# Patient Record
Sex: MALE | URBAN - METROPOLITAN AREA
[De-identification: names, ages, dates, MRNs, and addresses within clinical notes are randomized per-mention and may not be internally consistent; named-entity substitution may affect disease eponyms.]

---

## 2020-12-03 ENCOUNTER — EMERGENCY (EMERGENCY)
Facility: HOSPITAL | Age: 70
LOS: 1 days | Discharge: HOME | End: 2020-12-03
Attending: EMERGENCY MEDICINE | Admitting: EMERGENCY MEDICINE
Payer: MEDICAID

## 2020-12-03 VITALS
DIASTOLIC BLOOD PRESSURE: 107 MMHG | TEMPERATURE: 100 F | OXYGEN SATURATION: 100 % | HEART RATE: 117 BPM | SYSTOLIC BLOOD PRESSURE: 180 MMHG | RESPIRATION RATE: 18 BRPM

## 2020-12-03 VITALS
TEMPERATURE: 101 F | DIASTOLIC BLOOD PRESSURE: 96 MMHG | HEART RATE: 108 BPM | OXYGEN SATURATION: 98 % | RESPIRATION RATE: 20 BRPM | SYSTOLIC BLOOD PRESSURE: 210 MMHG

## 2020-12-03 LAB
ALBUMIN SERPL ELPH-MCNC: 4.1 G/DL — SIGNIFICANT CHANGE UP (ref 3.3–5)
ALP SERPL-CCNC: 131 U/L — HIGH (ref 40–120)
ALT FLD-CCNC: 282 U/L — HIGH (ref 4–41)
ANION GAP SERPL CALC-SCNC: 12 MMO/L — SIGNIFICANT CHANGE UP (ref 7–14)
APPEARANCE UR: CLEAR — SIGNIFICANT CHANGE UP
APTT BLD: 30.9 SEC — SIGNIFICANT CHANGE UP (ref 27–36.3)
AST SERPL-CCNC: 159 U/L — HIGH (ref 4–40)
BASE EXCESS BLDV CALC-SCNC: -1 MMOL/L — SIGNIFICANT CHANGE UP
BASOPHILS # BLD AUTO: 0.02 K/UL — SIGNIFICANT CHANGE UP (ref 0–0.2)
BASOPHILS NFR BLD AUTO: 0.2 % — SIGNIFICANT CHANGE UP (ref 0–2)
BILIRUB SERPL-MCNC: 1 MG/DL — SIGNIFICANT CHANGE UP (ref 0.2–1.2)
BILIRUB UR-MCNC: NEGATIVE — SIGNIFICANT CHANGE UP
BLOOD GAS VENOUS - CREATININE: 1.18 MG/DL — SIGNIFICANT CHANGE UP (ref 0.5–1.3)
BLOOD GAS VENOUS - FIO2: 21 — SIGNIFICANT CHANGE UP
BLOOD UR QL VISUAL: SIGNIFICANT CHANGE UP
BUN SERPL-MCNC: 15 MG/DL — SIGNIFICANT CHANGE UP (ref 7–23)
CALCIUM SERPL-MCNC: 9.9 MG/DL — SIGNIFICANT CHANGE UP (ref 8.4–10.5)
CHLORIDE BLDV-SCNC: 109 MMOL/L — HIGH (ref 96–108)
CHLORIDE SERPL-SCNC: 101 MMOL/L — SIGNIFICANT CHANGE UP (ref 98–107)
CO2 SERPL-SCNC: 21 MMOL/L — LOW (ref 22–31)
COLOR SPEC: YELLOW — SIGNIFICANT CHANGE UP
CREAT SERPL-MCNC: 1.16 MG/DL — SIGNIFICANT CHANGE UP (ref 0.5–1.3)
EOSINOPHIL # BLD AUTO: 0 K/UL — SIGNIFICANT CHANGE UP (ref 0–0.5)
EOSINOPHIL NFR BLD AUTO: 0 % — SIGNIFICANT CHANGE UP (ref 0–6)
GAS PNL BLDV: 134 MMOL/L — LOW (ref 136–146)
GLUCOSE BLDV-MCNC: 172 MG/DL — HIGH (ref 70–99)
GLUCOSE SERPL-MCNC: 179 MG/DL — HIGH (ref 70–99)
GLUCOSE UR-MCNC: NEGATIVE — SIGNIFICANT CHANGE UP
HCO3 BLDV-SCNC: 24 MMOL/L — SIGNIFICANT CHANGE UP (ref 20–27)
HCT VFR BLD CALC: 40.4 % — SIGNIFICANT CHANGE UP (ref 39–50)
HCT VFR BLDV CALC: 42.7 % — SIGNIFICANT CHANGE UP (ref 39–51)
HGB BLD-MCNC: 13.5 G/DL — SIGNIFICANT CHANGE UP (ref 13–17)
HGB BLDV-MCNC: 13.9 G/DL — SIGNIFICANT CHANGE UP (ref 13–17)
IMM GRANULOCYTES NFR BLD AUTO: 0.3 % — SIGNIFICANT CHANGE UP (ref 0–1.5)
INR BLD: 1.31 — HIGH (ref 0.88–1.16)
KETONES UR-MCNC: SIGNIFICANT CHANGE UP
LACTATE BLDV-MCNC: 1.6 MMOL/L — SIGNIFICANT CHANGE UP (ref 0.5–2)
LEUKOCYTE ESTERASE UR-ACNC: NEGATIVE — SIGNIFICANT CHANGE UP
LYMPHOCYTES # BLD AUTO: 1.03 K/UL — SIGNIFICANT CHANGE UP (ref 1–3.3)
LYMPHOCYTES # BLD AUTO: 11.1 % — LOW (ref 13–44)
MCHC RBC-ENTMCNC: 28.6 PG — SIGNIFICANT CHANGE UP (ref 27–34)
MCHC RBC-ENTMCNC: 33.4 % — SIGNIFICANT CHANGE UP (ref 32–36)
MCV RBC AUTO: 85.6 FL — SIGNIFICANT CHANGE UP (ref 80–100)
MONOCYTES # BLD AUTO: 0.8 K/UL — SIGNIFICANT CHANGE UP (ref 0–0.9)
MONOCYTES NFR BLD AUTO: 8.7 % — SIGNIFICANT CHANGE UP (ref 2–14)
NEUTROPHILS # BLD AUTO: 7.36 K/UL — SIGNIFICANT CHANGE UP (ref 1.8–7.4)
NEUTROPHILS NFR BLD AUTO: 79.7 % — HIGH (ref 43–77)
NITRITE UR-MCNC: NEGATIVE — SIGNIFICANT CHANGE UP
NRBC # FLD: 0 K/UL — SIGNIFICANT CHANGE UP (ref 0–0)
PCO2 BLDV: 37 MMHG — LOW (ref 41–51)
PH BLDV: 7.41 PH — SIGNIFICANT CHANGE UP (ref 7.32–7.43)
PH UR: 6 — SIGNIFICANT CHANGE UP (ref 5–8)
PLATELET # BLD AUTO: 160 K/UL — SIGNIFICANT CHANGE UP (ref 150–400)
PMV BLD: 11.7 FL — SIGNIFICANT CHANGE UP (ref 7–13)
PO2 BLDV: 57 MMHG — HIGH (ref 35–40)
POTASSIUM BLDV-SCNC: 3.3 MMOL/L — LOW (ref 3.4–4.5)
POTASSIUM SERPL-MCNC: 3.5 MMOL/L — SIGNIFICANT CHANGE UP (ref 3.5–5.3)
POTASSIUM SERPL-SCNC: 3.5 MMOL/L — SIGNIFICANT CHANGE UP (ref 3.5–5.3)
PROT SERPL-MCNC: 7.5 G/DL — SIGNIFICANT CHANGE UP (ref 6–8.3)
PROT UR-MCNC: 70 — SIGNIFICANT CHANGE UP
PROTHROM AB SERPL-ACNC: 14.7 SEC — HIGH (ref 10.6–13.6)
RBC # BLD: 4.72 M/UL — SIGNIFICANT CHANGE UP (ref 4.2–5.8)
RBC # FLD: 13 % — SIGNIFICANT CHANGE UP (ref 10.3–14.5)
RBC CASTS # UR COMP ASSIST: SIGNIFICANT CHANGE UP (ref 0–?)
SAO2 % BLDV: 89.6 % — HIGH (ref 60–85)
SODIUM SERPL-SCNC: 134 MMOL/L — LOW (ref 135–145)
SP GR SPEC: 1.02 — SIGNIFICANT CHANGE UP (ref 1–1.04)
UROBILINOGEN FLD QL: NORMAL — SIGNIFICANT CHANGE UP
WBC # BLD: 9.24 K/UL — SIGNIFICANT CHANGE UP (ref 3.8–10.5)
WBC # FLD AUTO: 9.24 K/UL — SIGNIFICANT CHANGE UP (ref 3.8–10.5)
WBC UR QL: SIGNIFICANT CHANGE UP (ref 0–?)

## 2020-12-03 PROCEDURE — 99284 EMERGENCY DEPT VISIT MOD MDM: CPT

## 2020-12-03 PROCEDURE — 71045 X-RAY EXAM CHEST 1 VIEW: CPT | Mod: 26

## 2020-12-03 RX ORDER — ACETAMINOPHEN 500 MG
650 TABLET ORAL ONCE
Refills: 0 | Status: COMPLETED | OUTPATIENT
Start: 2020-12-03 | End: 2020-12-03

## 2020-12-03 RX ORDER — SODIUM CHLORIDE 9 MG/ML
1000 INJECTION INTRAMUSCULAR; INTRAVENOUS; SUBCUTANEOUS ONCE
Refills: 0 | Status: COMPLETED | OUTPATIENT
Start: 2020-12-03 | End: 2020-12-03

## 2020-12-03 RX ADMIN — SODIUM CHLORIDE 500 MILLILITER(S): 9 INJECTION INTRAMUSCULAR; INTRAVENOUS; SUBCUTANEOUS at 16:52

## 2020-12-03 RX ADMIN — Medication 650 MILLIGRAM(S): at 17:26

## 2020-12-03 NOTE — ED ADULT NURSE NOTE - OBJECTIVE STATEMENT
Pt presents to rm 18, A&Ox4, ambulatory w/o assistance, here for evaluation of "weakness." Pt denies pain at this time. Pt has temp of 100.5 and hypertensive at this time- SOPHIA Argueta aware, no interventions ordered at this time. Denies chest pain, shortness of breath, palpitations, diaphoresis, headaches, fevers, dizziness, nausea, vomiting, diarrhea, or urinary symptoms at this time. IV established in left arm with a 18G, labs drawn and sent, call bell in reach, warm blanket provided, bed in lowest position, side rails up x2, MD evaluation in progress. Will continue to monitor.

## 2020-12-03 NOTE — ED ADULT TRIAGE NOTE - CHIEF COMPLAINT QUOTE
pt brought in by ambulance from home, sent to ED for high blood pressure. also c/o weakness and frequent urination. no facial droop noted in triage. left sided weakness noted. red attending called to triage for eval. pt feels warm to touch. pt is German speaking.

## 2020-12-03 NOTE — ED PROVIDER NOTE - NSFOLLOWUPINSTRUCTIONS_ED_ALL_ED_FT
Fever    A fever is an increase in the body's temperature above 100.4°F (38°C) or higher. In adults and children older than three months, a brief mild or moderate fever generally has no long-term effect, and it usually does not require treatment. Many times, fevers are the result of viral infections, which are self-resolving.  However, certain symptoms or diagnostic tests may suggest a bacterial infection that may respond to antibiotics. Take medications as directed by your health care provider.    SEEK IMMEDIATE MEDICAL CARE IF YOU OR YOUR CHILD HAVE ANY OF THE FOLLOWING SYMPTOMS : shortness of breath, seizure, rash/stiff neck/headache, severe abdominal pain, persistent vomiting, any signs of dehydration, or if your child has a fever for over five (5) days.    Please follow up w/ primary care.

## 2020-12-03 NOTE — ED PROVIDER NOTE - CLINICAL SUMMARY MEDICAL DECISION MAKING FREE TEXT BOX
71 Y/O M w/ no PMH presents to ER w/ weakness. ED sepsis work up. 69 Y/O M w/ no PMH presents to ER w/ weakness. ED sepsis work up.    Lab results, EKG and CXR 69 Y/O M w/ no PMH presents to ER w/ weakness. ED sepsis work up.    EKG and CXR unremarkable. Elevated LFTs. No abdominal pain. Can follow up outpatient. Elevated BP w/o end organ damage. Pt offers no complaints at this time. Discharge center will coordinate primary care appointment. Return precautions provided

## 2020-12-03 NOTE — ED ADULT NURSE NOTE - CHIEF COMPLAINT QUOTE
pt brought in by ambulance from home, sent to ED for high blood pressure. also c/o weakness and frequent urination. no facial droop noted in triage. left sided weakness noted. red attending called to triage for eval. pt feels warm to touch. pt is Korean speaking.

## 2020-12-03 NOTE — ED PROVIDER NOTE - NS ED ROS FT
Constitutional: (-) fever (+) weakness  Head: Normal cephalic, Atraumatic  Eyes/ENT: (-) vision changes  Cardiovascular: (-) chest pain, (-) wheezing  Respiratory: (-) cough, (-) shortness of breath  Gastrointestinal: (-) vomiting, (-) diarrhea, (-) abdominal pain  : (-) dysuria   Musculoskeletal: (-) back pain  Integumentary: (-) rash, (-) edema  Neurological: (-)loc  Allergic/Immunologic: (-) pruritus

## 2020-12-03 NOTE — ED PROVIDER NOTE - PROGRESS NOTE DETAILS
Severo (Son, [phone]) contacted ER to discuss father's condition. Pt states dad started to feel weakness yesterday evening

## 2020-12-03 NOTE — ED PROVIDER NOTE - OBJECTIVE STATEMENT
69 Y/O M w/ no PMH presents to ER w/ weakness. PT is Japanese speaking. Admits to feeling weak earlier this morning. No recent travel, no sick contacts, former smoker. Able to tolerate PO oral intake. Denies fever, nausea/vomiting, dizziness, headache, sore throat, cough, chest pain, shortness of breath, palpitations or syncope.

## 2020-12-03 NOTE — ED PROVIDER NOTE - PHYSICAL EXAMINATION
Vital signs reviewed.   CONSTITUTIONAL: Well-appearing; well-nourished; in no apparent distress. Non-toxic appearing.   HEAD: Normocephalic, atraumatic.  EYES: PERRL, EOM intact, conjunctiva and no sclera injection noted  ENT: normal nose; no rhinorrhea; normal pharynx with no tonsillar hypertrophy, no erythema, no exudate, patent airway  NECK/LYMPH: Supple; non-tender  CARD: Normal S1, S2  RESP: Normal chest excursion with respiration; breath sounds clear and equal bilaterally  ABD/GI: soft, non-distended; non-tender  EXT/MS: moves all extremities; distal pulses are normal, no pedal edema.  SKIN: Normal for age and race; warm; dry; good turgor; no apparent lesions or exudate noted.  NEURO: Awake, alert, oriented x 3, no gross deficits, CN II-XII grossly intact, no motor or sensory deficit noted.  PSYCH: Normal mood; appropriate affect.

## 2020-12-03 NOTE — ED PROVIDER NOTE - PATIENT PORTAL LINK FT
You can access the FollowMyHealth Patient Portal offered by [facility] by registering at the following website: [email or web address]. By joining [facility]’s FollowMyHealth portal, you will also be able to view your health information using other applications (apps) compatible with our system.

## 2020-12-04 LAB
CULTURE RESULTS: SIGNIFICANT CHANGE UP
SARS-COV-2 RNA SPEC QL NAA+PROBE: SIGNIFICANT CHANGE UP
SPECIMEN SOURCE: SIGNIFICANT CHANGE UP

## 2020-12-08 LAB
CULTURE RESULTS: SIGNIFICANT CHANGE UP
CULTURE RESULTS: SIGNIFICANT CHANGE UP
SPECIMEN SOURCE: SIGNIFICANT CHANGE UP
SPECIMEN SOURCE: SIGNIFICANT CHANGE UP

## 2020-12-16 PROBLEM — Z00.00 ENCOUNTER FOR PREVENTIVE HEALTH EXAMINATION: Status: ACTIVE | Noted: 2020-12-16

## 2020-12-17 ENCOUNTER — APPOINTMENT (OUTPATIENT)
Dept: INTERNAL MEDICINE | Facility: CLINIC | Age: 70
End: 2020-12-17

## 2021-01-19 ENCOUNTER — OUTPATIENT (OUTPATIENT)
Dept: OUTPATIENT SERVICES | Facility: HOSPITAL | Age: 71
LOS: 1 days | End: 2021-01-19

## 2021-01-19 ENCOUNTER — APPOINTMENT (OUTPATIENT)
Dept: INTERNAL MEDICINE | Facility: CLINIC | Age: 71
End: 2021-01-19
Payer: MEDICAID

## 2021-01-19 ENCOUNTER — NON-APPOINTMENT (OUTPATIENT)
Age: 71
End: 2021-01-19

## 2021-01-19 VITALS
OXYGEN SATURATION: 96 % | HEART RATE: 120 BPM | BODY MASS INDEX: 25.61 KG/M2 | HEIGHT: 64 IN | WEIGHT: 150 LBS | DIASTOLIC BLOOD PRESSURE: 110 MMHG | SYSTOLIC BLOOD PRESSURE: 198 MMHG | RESPIRATION RATE: 16 BRPM

## 2021-01-19 DIAGNOSIS — Z86.39 PERSONAL HISTORY OF OTHER ENDOCRINE, NUTRITIONAL AND METABOLIC DISEASE: ICD-10-CM

## 2021-01-19 PROCEDURE — 99204 OFFICE O/P NEW MOD 45 MIN: CPT | Mod: GC

## 2021-01-19 RX ORDER — LISINOPRIL 20 MG/1
20 TABLET ORAL
Qty: 30 | Refills: 1 | Status: ACTIVE | COMMUNITY
Start: 2021-01-19 | End: 1900-01-01

## 2021-01-19 RX ORDER — AMLODIPINE BESYLATE 10 MG/1
10 TABLET ORAL
Qty: 30 | Refills: 4 | Status: ACTIVE | COMMUNITY
Start: 2021-01-19 | End: 1900-01-01

## 2021-01-22 DIAGNOSIS — I10 ESSENTIAL (PRIMARY) HYPERTENSION: ICD-10-CM

## 2021-01-22 DIAGNOSIS — Z86.39 PERSONAL HISTORY OF OTHER ENDOCRINE, NUTRITIONAL AND METABOLIC DISEASE: ICD-10-CM

## 2021-01-22 NOTE — HISTORY OF PRESENT ILLNESS
[FreeTextEntry1] : Establish care [de-identified] : Pt is 72 y/o M with PMHx HTN, type II DM here to establish care. \par \par Patient is Mongolian speaking and is accompanied by his daughter to assist with translation. Patient has lived in the [place] on and off for the last 10 years. Patient had difficulty with insurance and stopped taking medications regularly. He is intermittently taking lisinopril 10 BID, aspirin 81 mg qd and metformin 500 qd. Patient also has difficulty with hearing. Patient intermittently checks blood pressure at home with SBP readings in 160s. Patient reports not taking his BP medication this morning. Of note, patient's BP elevated at 198/100. Patient denies any SOB, chest pain, changes in vision.

## 2021-01-22 NOTE — PLAN
[FreeTextEntry1] : Pt is 72 y/o M with PMHx HTN, type II DM, of here to establish care. \par \par #HTN\par -BP today elevated 198/100 - with repeat /110 \par -Pt noncompliant with medication regimen\par -EKG shows sinus tachycardia without ischemic changes - pt remains asymptomatic \par -Start lisinopril 20 qd, amlodipine 10 qd\par -Plan to reassess BP next week and make any adjustments to medication regimen as needed\par -Pt instructed to regularly check his BP at home \par \par #DM\par -Unable to complete labs today - plan to acquire labs at next visit and address diabetic regimen at that time\par \par Pt to RTC in 1 week \par \par Scarlet Brooks PGY-2 \par Discussed with and patient seen with Dr. Lange

## 2021-01-22 NOTE — END OF VISIT
[] : Resident [FreeTextEntry3] : repeat BP improved. EKG NSR with no ischemic changes. pt asymptomatic. restarted HTN meds- lisinoptol and added amlodipine. recheck BP in 1 week. encourage med complaiance and checking BP at home. advised to call sooner if BP greater than 180/100 at home.

## 2021-01-22 NOTE — REVIEW OF SYSTEMS
[Frequency] : frequency [Negative] : Heme/Lymph [Dysuria] : no dysuria [Incontinence] : no incontinence [Hesitancy] : no hesitancy [Nocturia] : no nocturia [Hematuria] : no hematuria [Impotence] : no impotency [Poor Libido] : libido not poor

## 2021-01-22 NOTE — PHYSICAL EXAM
[No Acute Distress] : no acute distress [Well Nourished] : well nourished [Normal Rate] : normal rate  [Regular Rhythm] : with a regular rhythm [Normal S1, S2] : normal S1 and S2 [No Edema] : there was no peripheral edema [Soft] : abdomen soft [No HSM] : no HSM [Normal] : no joint swelling and grossly normal strength and tone [de-identified] : elderly male

## 2021-02-01 ENCOUNTER — APPOINTMENT (OUTPATIENT)
Dept: INTERNAL MEDICINE | Facility: CLINIC | Age: 71
End: 2021-02-01

## 2021-02-10 ENCOUNTER — RESULT CHARGE (OUTPATIENT)
Age: 71
End: 2021-02-10

## 2021-02-10 ENCOUNTER — APPOINTMENT (OUTPATIENT)
Dept: INTERNAL MEDICINE | Facility: CLINIC | Age: 71
End: 2021-02-10
Payer: MEDICAID

## 2021-02-10 ENCOUNTER — OUTPATIENT (OUTPATIENT)
Dept: OUTPATIENT SERVICES | Facility: HOSPITAL | Age: 71
LOS: 1 days | End: 2021-02-10

## 2021-02-10 VITALS
RESPIRATION RATE: 16 BRPM | DIASTOLIC BLOOD PRESSURE: 78 MMHG | HEIGHT: 64 IN | SYSTOLIC BLOOD PRESSURE: 140 MMHG | HEART RATE: 109 BPM | WEIGHT: 150 LBS | BODY MASS INDEX: 25.61 KG/M2 | OXYGEN SATURATION: 99 %

## 2021-02-10 VITALS — TEMPERATURE: 98.1 F

## 2021-02-10 DIAGNOSIS — E11.9 TYPE 2 DIABETES MELLITUS W/OUT COMPLICATIONS: ICD-10-CM

## 2021-02-10 DIAGNOSIS — Z23 ENCOUNTER FOR IMMUNIZATION: ICD-10-CM

## 2021-02-10 DIAGNOSIS — I10 ESSENTIAL (PRIMARY) HYPERTENSION: ICD-10-CM

## 2021-02-10 DIAGNOSIS — H57.89 OTHER SPECIFIED DISORDERS OF EYE AND ADNEXA: ICD-10-CM

## 2021-02-10 DIAGNOSIS — H91.93 UNSPECIFIED HEARING LOSS, BILATERAL: ICD-10-CM

## 2021-02-10 PROCEDURE — 99213 OFFICE O/P EST LOW 20 MIN: CPT | Mod: GE

## 2021-02-10 RX ORDER — METFORMIN HYDROCHLORIDE 500 MG/1
500 TABLET, COATED ORAL TWICE DAILY
Qty: 180 | Refills: 3 | Status: ACTIVE | COMMUNITY
Start: 2021-02-10 | End: 1900-01-01

## 2021-02-10 RX ORDER — DEXTRAN 70/HYPROMELLOSE 0.1%-0.3%
0.1-0.3 DROPS OPHTHALMIC (EYE) TWICE DAILY
Qty: 15 | Refills: 3 | Status: ACTIVE | COMMUNITY
Start: 2021-02-10 | End: 1900-01-01

## 2021-02-10 RX ORDER — CHLORHEXIDINE/GLYCERIN/HE-CELL
5-100 JELLY (GRAM) TOPICAL EVERY 4 HOURS
Qty: 1 | Refills: 1 | Status: ACTIVE | COMMUNITY
Start: 2021-02-10 | End: 1900-01-01

## 2021-02-10 NOTE — PHYSICAL EXAM
[Well Nourished] : well nourished [No Acute Distress] : no acute distress [Well Developed] : well developed [Well-Appearing] : well-appearing [Normal Sclera/Conjunctiva] : normal sclera/conjunctiva [PERRL] : pupils equal round and reactive to light [EOMI] : extraocular movements intact [Normal Outer Ear/Nose] : the outer ears and nose were normal in appearance [Normal Oropharynx] : the oropharynx was normal [No JVD] : no jugular venous distention [No Lymphadenopathy] : no lymphadenopathy [Supple] : supple [Thyroid Normal, No Nodules] : the thyroid was normal and there were no nodules present [No Respiratory Distress] : no respiratory distress  [No Accessory Muscle Use] : no accessory muscle use [Clear to Auscultation] : lungs were clear to auscultation bilaterally [Normal Rate] : normal rate  [Regular Rhythm] : with a regular rhythm [Normal S1, S2] : normal S1 and S2 [No Murmur] : no murmur heard [No Carotid Bruits] : no carotid bruits [No Abdominal Bruit] : a ~M bruit was not heard ~T in the abdomen [No Varicosities] : no varicosities [Pedal Pulses Present] : the pedal pulses are present [No Edema] : there was no peripheral edema [No Palpable Aorta] : no palpable aorta [No Extremity Clubbing/Cyanosis] : no extremity clubbing/cyanosis [Soft] : abdomen soft [Non Tender] : non-tender [Non-distended] : non-distended [No Masses] : no abdominal mass palpated [No HSM] : no HSM [Normal Bowel Sounds] : normal bowel sounds [Normal Posterior Cervical Nodes] : no posterior cervical lymphadenopathy [Normal Anterior Cervical Nodes] : no anterior cervical lymphadenopathy [No CVA Tenderness] : no CVA  tenderness [No Spinal Tenderness] : no spinal tenderness [No Joint Swelling] : no joint swelling [Grossly Normal Strength/Tone] : grossly normal strength/tone [No Rash] : no rash [Coordination Grossly Intact] : coordination grossly intact [No Focal Deficits] : no focal deficits [Normal Gait] : normal gait [Deep Tendon Reflexes (DTR)] : deep tendon reflexes were 2+ and symmetric [Normal Affect] : the affect was normal [Normal Insight/Judgement] : insight and judgment were intact

## 2021-02-11 ENCOUNTER — APPOINTMENT (OUTPATIENT)
Dept: INTERNAL MEDICINE | Facility: CLINIC | Age: 71
End: 2021-02-11

## 2021-02-11 LAB
GLUCOSE BLDC GLUCOMTR-MCNC: 213
HBA1C MFR BLD HPLC: 7.4

## 2021-02-16 DIAGNOSIS — Z23 ENCOUNTER FOR IMMUNIZATION: ICD-10-CM

## 2021-02-16 DIAGNOSIS — H91.93 UNSPECIFIED HEARING LOSS, BILATERAL: ICD-10-CM

## 2021-02-16 DIAGNOSIS — H57.89 OTHER SPECIFIED DISORDERS OF EYE AND ADNEXA: ICD-10-CM

## 2021-02-16 DIAGNOSIS — E11.9 TYPE 2 DIABETES MELLITUS WITHOUT COMPLICATIONS: ICD-10-CM

## 2021-02-16 DIAGNOSIS — I10 ESSENTIAL (PRIMARY) HYPERTENSION: ICD-10-CM

## 2021-02-16 NOTE — HISTORY OF PRESENT ILLNESS
[FreeTextEntry1] : The patient is here for follow up.  [de-identified] : 71M PMH HTN, T2DM, and hearing impairment with hearing aids who presents for follow up visit.\par \par 1) Cough - Per daughter-in-law, the patient has been experiencing a cough productive of mucus for the past month. He coughs more when eating, talking or drinking. It is not because it is difficult to swallow but more that he needs to clear his throat, is drinking something cold, or eating spicy food. He has a runny nose at times. He also appears tired and feels a bit short of breath with activity. He does not exercise. Patient has no history of smoking, lung disease or heart disease. He denies fever, sick contacts, recent travel, or COVID-19 exposure. Last COVID-19 test in December 2020 was negative. Cough started before starting lisinopril. He denies allergies. He has not tried anything for the cough.\par \par 2) HTN - Since starting his lisinopril and amlodipine, the he has been compliant with his medication. He checks his BP at home occasionally. \par \par 3) T2DM - Patient took oral blood sugar medications in [place] the [place]. He has had issues with insurance so has not been compliant. He was taking Glucophage in [place]. He denies hypoglycemic episodes, polyuria, and polydipsia. He eats all kinds of food without restriction. He does not check his blood sugar at home.\par \par 4) HCM - Patient is uncertain of immunization history and has never had a colonoscopy.

## 2021-02-16 NOTE — PLAN
[FreeTextEntry1] : 71M PMH HTN, T2DM, and hearing impairment with hearing aids who presents for follow up visit.\par \par 1) Cough - likely 2/2 upper respiratory infection. Not likely lisinopril given cough before starting the medication. Patient without clinical signs of heart failure. Patient without allergies. Patient has not tried to treat cough.\par - will prescribe cough medicine.\par - if no improvement in cough, will consider CXR.\par \par 2) HTN - /78 mm Hg in office. Goal is <130/80 mm Hg. Given patient's current generalized weakness and fatigue, will address increasing medication dose at next visit. \par - counseled patient on low-salt diet.\par - Continue with lisinopril 20 mg daily and amlodipine 10 mg daily for now.\par \par 3) T2DM -  mg/dL. Hgb A1C 7.4%. Counseled patient on low-sugar diet.\par - will begin metformin 500 mg BID.\par - will check CMP and lipid panel.\par \par 4) HCM - Patient received his influenza vaccine today.\par \par Patient discussed with Dr. Whiting.\par All risks and benefits were discussed with patient. \par Patient and attending agreed with the above assessment and plan.\par \par Zeynep Gilmore M.D. PGY-3\par Internal Medicine\par [facility] [place]\par [phone]\par

## 2021-02-23 ENCOUNTER — OUTPATIENT (OUTPATIENT)
Dept: OUTPATIENT SERVICES | Facility: HOSPITAL | Age: 71
LOS: 1 days | End: 2021-02-23

## 2021-02-23 ENCOUNTER — APPOINTMENT (OUTPATIENT)
Dept: INTERNAL MEDICINE | Facility: CLINIC | Age: 71
End: 2021-02-23

## 2021-02-23 ENCOUNTER — RESULT REVIEW (OUTPATIENT)
Age: 71
End: 2021-02-23

## 2021-02-23 LAB
ALBUMIN SERPL ELPH-MCNC: 4.5 G/DL — SIGNIFICANT CHANGE UP (ref 3.3–5)
ALP SERPL-CCNC: 100 U/L — SIGNIFICANT CHANGE UP (ref 40–120)
ALT FLD-CCNC: 14 U/L — SIGNIFICANT CHANGE UP (ref 4–41)
ANION GAP SERPL CALC-SCNC: 10 MMOL/L — SIGNIFICANT CHANGE UP (ref 7–14)
AST SERPL-CCNC: 11 U/L — SIGNIFICANT CHANGE UP (ref 4–40)
BASOPHILS # BLD AUTO: 0.04 K/UL — SIGNIFICANT CHANGE UP (ref 0–0.2)
BASOPHILS NFR BLD AUTO: 0.4 % — SIGNIFICANT CHANGE UP (ref 0–2)
BILIRUB SERPL-MCNC: 0.2 MG/DL — SIGNIFICANT CHANGE UP (ref 0.2–1.2)
BUN SERPL-MCNC: 20 MG/DL — SIGNIFICANT CHANGE UP (ref 7–23)
CALCIUM SERPL-MCNC: 10.6 MG/DL — HIGH (ref 8.4–10.5)
CHLORIDE SERPL-SCNC: 103 MMOL/L — SIGNIFICANT CHANGE UP (ref 98–107)
CHOLEST SERPL-MCNC: 142 MG/DL — SIGNIFICANT CHANGE UP
CO2 SERPL-SCNC: 26 MMOL/L — SIGNIFICANT CHANGE UP (ref 22–31)
CREAT SERPL-MCNC: 1.11 MG/DL — SIGNIFICANT CHANGE UP (ref 0.5–1.3)
EOSINOPHIL # BLD AUTO: 0.26 K/UL — SIGNIFICANT CHANGE UP (ref 0–0.5)
EOSINOPHIL NFR BLD AUTO: 2.5 % — SIGNIFICANT CHANGE UP (ref 0–6)
GLUCOSE SERPL-MCNC: 212 MG/DL — HIGH (ref 70–99)
HCT VFR BLD CALC: 45.6 % — SIGNIFICANT CHANGE UP (ref 39–50)
HDLC SERPL-MCNC: 46 MG/DL — SIGNIFICANT CHANGE UP
HGB BLD-MCNC: 13.9 G/DL — SIGNIFICANT CHANGE UP (ref 13–17)
IANC: 5.29 K/UL — SIGNIFICANT CHANGE UP (ref 1.5–8.5)
IMM GRANULOCYTES NFR BLD AUTO: 0.5 % — SIGNIFICANT CHANGE UP (ref 0–1.5)
LIPID PNL WITH DIRECT LDL SERPL: 78 MG/DL — SIGNIFICANT CHANGE UP
LYMPHOCYTES # BLD AUTO: 38.7 % — SIGNIFICANT CHANGE UP (ref 13–44)
LYMPHOCYTES # BLD AUTO: 4.1 K/UL — HIGH (ref 1–3.3)
MCHC RBC-ENTMCNC: 27.3 PG — SIGNIFICANT CHANGE UP (ref 27–34)
MCHC RBC-ENTMCNC: 30.5 GM/DL — LOW (ref 32–36)
MCV RBC AUTO: 89.4 FL — SIGNIFICANT CHANGE UP (ref 80–100)
MONOCYTES # BLD AUTO: 0.86 K/UL — SIGNIFICANT CHANGE UP (ref 0–0.9)
MONOCYTES NFR BLD AUTO: 8.1 % — SIGNIFICANT CHANGE UP (ref 2–14)
NEUTROPHILS # BLD AUTO: 5.29 K/UL — SIGNIFICANT CHANGE UP (ref 1.8–7.4)
NEUTROPHILS NFR BLD AUTO: 49.8 % — SIGNIFICANT CHANGE UP (ref 43–77)
NON HDL CHOLESTEROL: 96 MG/DL — SIGNIFICANT CHANGE UP
NRBC # BLD: 0 /100 WBCS — SIGNIFICANT CHANGE UP
NRBC # FLD: 0 K/UL — SIGNIFICANT CHANGE UP
PLATELET # BLD AUTO: 225 K/UL — SIGNIFICANT CHANGE UP (ref 150–400)
POTASSIUM SERPL-MCNC: 4.7 MMOL/L — SIGNIFICANT CHANGE UP (ref 3.5–5.3)
POTASSIUM SERPL-SCNC: 4.7 MMOL/L — SIGNIFICANT CHANGE UP (ref 3.5–5.3)
PROT SERPL-MCNC: 8.1 G/DL — SIGNIFICANT CHANGE UP (ref 6–8.3)
RBC # BLD: 5.1 M/UL — SIGNIFICANT CHANGE UP (ref 4.2–5.8)
RBC # FLD: 13.1 % — SIGNIFICANT CHANGE UP (ref 10.3–14.5)
SODIUM SERPL-SCNC: 139 MMOL/L — SIGNIFICANT CHANGE UP (ref 135–145)
TRIGL SERPL-MCNC: 88 MG/DL — SIGNIFICANT CHANGE UP
TSH SERPL-MCNC: 0.67 UIU/ML — SIGNIFICANT CHANGE UP (ref 0.27–4.2)
WBC # BLD: 10.6 K/UL — HIGH (ref 3.8–10.5)
WBC # FLD AUTO: 10.6 K/UL — HIGH (ref 3.8–10.5)

## 2021-02-26 DIAGNOSIS — E11.9 TYPE 2 DIABETES MELLITUS WITHOUT COMPLICATIONS: ICD-10-CM

## 2021-05-26 ENCOUNTER — APPOINTMENT (OUTPATIENT)
Dept: INTERNAL MEDICINE | Facility: CLINIC | Age: 71
End: 2021-05-26

## 2022-06-23 ENCOUNTER — NON-APPOINTMENT (OUTPATIENT)
Age: 72
End: 2022-06-23

## 2022-07-19 ENCOUNTER — APPOINTMENT (OUTPATIENT)
Dept: INTERNAL MEDICINE | Facility: CLINIC | Age: 72
End: 2022-07-19

## 2022-08-01 ENCOUNTER — APPOINTMENT (OUTPATIENT)
Dept: INTERNAL MEDICINE | Facility: CLINIC | Age: 72
End: 2022-08-01

## 2022-08-16 ENCOUNTER — INPATIENT (INPATIENT)
Facility: HOSPITAL | Age: 72
LOS: 10 days | Discharge: NURSING, REHAB OR LONG-TERM CARE | End: 2022-08-27
Attending: INTERNAL MEDICINE | Admitting: INTERNAL MEDICINE

## 2022-08-16 VITALS
OXYGEN SATURATION: 96 % | HEART RATE: 106 BPM | RESPIRATION RATE: 19 BRPM | DIASTOLIC BLOOD PRESSURE: 90 MMHG | SYSTOLIC BLOOD PRESSURE: 169 MMHG | TEMPERATURE: 101 F

## 2022-08-16 DIAGNOSIS — G93.41 METABOLIC ENCEPHALOPATHY: ICD-10-CM

## 2022-08-16 DIAGNOSIS — N40.0 BENIGN PROSTATIC HYPERPLASIA WITHOUT LOWER URINARY TRACT SYMPTOMS: ICD-10-CM

## 2022-08-16 DIAGNOSIS — E11.9 TYPE 2 DIABETES MELLITUS WITHOUT COMPLICATIONS: ICD-10-CM

## 2022-08-16 DIAGNOSIS — I10 ESSENTIAL (PRIMARY) HYPERTENSION: ICD-10-CM

## 2022-08-16 DIAGNOSIS — I50.9 HEART FAILURE, UNSPECIFIED: ICD-10-CM

## 2022-08-16 DIAGNOSIS — I63.9 CEREBRAL INFARCTION, UNSPECIFIED: ICD-10-CM

## 2022-08-16 DIAGNOSIS — U07.1 COVID-19: ICD-10-CM

## 2022-08-16 DIAGNOSIS — Z29.9 ENCOUNTER FOR PROPHYLACTIC MEASURES, UNSPECIFIED: ICD-10-CM

## 2022-08-16 DIAGNOSIS — R50.9 FEVER, UNSPECIFIED: ICD-10-CM

## 2022-08-16 LAB
ALBUMIN SERPL ELPH-MCNC: 4.4 G/DL — SIGNIFICANT CHANGE UP (ref 3.3–5)
ALP SERPL-CCNC: 88 U/L — SIGNIFICANT CHANGE UP (ref 40–120)
ALT FLD-CCNC: 12 U/L — SIGNIFICANT CHANGE UP (ref 4–41)
ANION GAP SERPL CALC-SCNC: 14 MMOL/L — SIGNIFICANT CHANGE UP (ref 7–14)
APTT BLD: 31.3 SEC — SIGNIFICANT CHANGE UP (ref 27–36.3)
AST SERPL-CCNC: 24 U/L — SIGNIFICANT CHANGE UP (ref 4–40)
B PERT DNA SPEC QL NAA+PROBE: SIGNIFICANT CHANGE UP
B PERT+PARAPERT DNA PNL SPEC NAA+PROBE: SIGNIFICANT CHANGE UP
BASOPHILS # BLD AUTO: 0.04 K/UL — SIGNIFICANT CHANGE UP (ref 0–0.2)
BASOPHILS NFR BLD AUTO: 0.5 % — SIGNIFICANT CHANGE UP (ref 0–2)
BILIRUB SERPL-MCNC: 0.6 MG/DL — SIGNIFICANT CHANGE UP (ref 0.2–1.2)
BLOOD GAS VENOUS COMPREHENSIVE RESULT: SIGNIFICANT CHANGE UP
BORDETELLA PARAPERTUSSIS (RAPRVP): SIGNIFICANT CHANGE UP
BUN SERPL-MCNC: 19 MG/DL — SIGNIFICANT CHANGE UP (ref 7–23)
C PNEUM DNA SPEC QL NAA+PROBE: SIGNIFICANT CHANGE UP
CALCIUM SERPL-MCNC: 10.1 MG/DL — SIGNIFICANT CHANGE UP (ref 8.4–10.5)
CHLORIDE SERPL-SCNC: 99 MMOL/L — SIGNIFICANT CHANGE UP (ref 98–107)
CO2 SERPL-SCNC: 21 MMOL/L — LOW (ref 22–31)
CREAT SERPL-MCNC: 1.22 MG/DL — SIGNIFICANT CHANGE UP (ref 0.5–1.3)
EGFR: 63 ML/MIN/1.73M2 — SIGNIFICANT CHANGE UP
EOSINOPHIL # BLD AUTO: 0.14 K/UL — SIGNIFICANT CHANGE UP (ref 0–0.5)
EOSINOPHIL NFR BLD AUTO: 1.6 % — SIGNIFICANT CHANGE UP (ref 0–6)
FLUAV SUBTYP SPEC NAA+PROBE: SIGNIFICANT CHANGE UP
FLUBV RNA SPEC QL NAA+PROBE: SIGNIFICANT CHANGE UP
GLUCOSE BLDC GLUCOMTR-MCNC: 131 MG/DL — HIGH (ref 70–99)
GLUCOSE BLDC GLUCOMTR-MCNC: 137 MG/DL — HIGH (ref 70–99)
GLUCOSE BLDC GLUCOMTR-MCNC: 142 MG/DL — HIGH (ref 70–99)
GLUCOSE BLDC GLUCOMTR-MCNC: 149 MG/DL — HIGH (ref 70–99)
GLUCOSE BLDC GLUCOMTR-MCNC: 194 MG/DL — HIGH (ref 70–99)
GLUCOSE SERPL-MCNC: 174 MG/DL — HIGH (ref 70–99)
HADV DNA SPEC QL NAA+PROBE: SIGNIFICANT CHANGE UP
HCOV 229E RNA SPEC QL NAA+PROBE: SIGNIFICANT CHANGE UP
HCOV HKU1 RNA SPEC QL NAA+PROBE: SIGNIFICANT CHANGE UP
HCOV NL63 RNA SPEC QL NAA+PROBE: SIGNIFICANT CHANGE UP
HCOV OC43 RNA SPEC QL NAA+PROBE: SIGNIFICANT CHANGE UP
HCT VFR BLD CALC: 41.8 % — SIGNIFICANT CHANGE UP (ref 39–50)
HGB BLD-MCNC: 14 G/DL — SIGNIFICANT CHANGE UP (ref 13–17)
HMPV RNA SPEC QL NAA+PROBE: SIGNIFICANT CHANGE UP
HPIV1 RNA SPEC QL NAA+PROBE: SIGNIFICANT CHANGE UP
HPIV2 RNA SPEC QL NAA+PROBE: SIGNIFICANT CHANGE UP
HPIV3 RNA SPEC QL NAA+PROBE: SIGNIFICANT CHANGE UP
HPIV4 RNA SPEC QL NAA+PROBE: SIGNIFICANT CHANGE UP
IANC: 6.1 K/UL — SIGNIFICANT CHANGE UP (ref 1.8–7.4)
IMM GRANULOCYTES NFR BLD AUTO: 0.4 % — SIGNIFICANT CHANGE UP (ref 0–1.5)
INR BLD: 1.26 RATIO — HIGH (ref 0.88–1.16)
LYMPHOCYTES # BLD AUTO: 1.14 K/UL — SIGNIFICANT CHANGE UP (ref 1–3.3)
LYMPHOCYTES # BLD AUTO: 13.3 % — SIGNIFICANT CHANGE UP (ref 13–44)
M PNEUMO DNA SPEC QL NAA+PROBE: SIGNIFICANT CHANGE UP
MCHC RBC-ENTMCNC: 29.3 PG — SIGNIFICANT CHANGE UP (ref 27–34)
MCHC RBC-ENTMCNC: 33.5 GM/DL — SIGNIFICANT CHANGE UP (ref 32–36)
MCV RBC AUTO: 87.4 FL — SIGNIFICANT CHANGE UP (ref 80–100)
MONOCYTES # BLD AUTO: 1.09 K/UL — HIGH (ref 0–0.9)
MONOCYTES NFR BLD AUTO: 12.8 % — SIGNIFICANT CHANGE UP (ref 2–14)
NEUTROPHILS # BLD AUTO: 6.1 K/UL — SIGNIFICANT CHANGE UP (ref 1.8–7.4)
NEUTROPHILS NFR BLD AUTO: 71.4 % — SIGNIFICANT CHANGE UP (ref 43–77)
NRBC # BLD: 0 /100 WBCS — SIGNIFICANT CHANGE UP (ref 0–0)
NRBC # FLD: 0 K/UL — SIGNIFICANT CHANGE UP (ref 0–0)
PLATELET # BLD AUTO: 138 K/UL — LOW (ref 150–400)
POTASSIUM SERPL-MCNC: 4.3 MMOL/L — SIGNIFICANT CHANGE UP (ref 3.5–5.3)
POTASSIUM SERPL-SCNC: 4.3 MMOL/L — SIGNIFICANT CHANGE UP (ref 3.5–5.3)
PROT SERPL-MCNC: 7.5 G/DL — SIGNIFICANT CHANGE UP (ref 6–8.3)
PROTHROM AB SERPL-ACNC: 14.6 SEC — HIGH (ref 10.5–13.4)
RAPID RVP RESULT: DETECTED
RBC # BLD: 4.78 M/UL — SIGNIFICANT CHANGE UP (ref 4.2–5.8)
RBC # FLD: 11.6 % — SIGNIFICANT CHANGE UP (ref 10.3–14.5)
RSV RNA SPEC QL NAA+PROBE: SIGNIFICANT CHANGE UP
RV+EV RNA SPEC QL NAA+PROBE: SIGNIFICANT CHANGE UP
SARS-COV-2 RNA SPEC QL NAA+PROBE: DETECTED
SODIUM SERPL-SCNC: 134 MMOL/L — LOW (ref 135–145)
WBC # BLD: 8.54 K/UL — SIGNIFICANT CHANGE UP (ref 3.8–10.5)
WBC # FLD AUTO: 8.54 K/UL — SIGNIFICANT CHANGE UP (ref 3.8–10.5)

## 2022-08-16 PROCEDURE — 70450 CT HEAD/BRAIN W/O DYE: CPT | Mod: 26,MA

## 2022-08-16 PROCEDURE — 71045 X-RAY EXAM CHEST 1 VIEW: CPT | Mod: 26

## 2022-08-16 PROCEDURE — 99285 EMERGENCY DEPT VISIT HI MDM: CPT

## 2022-08-16 RX ORDER — ACETAMINOPHEN 500 MG
650 TABLET ORAL EVERY 6 HOURS
Refills: 0 | Status: DISCONTINUED | OUTPATIENT
Start: 2022-08-16 | End: 2022-08-27

## 2022-08-16 RX ORDER — DEXTROSE 50 % IN WATER 50 %
25 SYRINGE (ML) INTRAVENOUS ONCE
Refills: 0 | Status: DISCONTINUED | OUTPATIENT
Start: 2022-08-16 | End: 2022-08-27

## 2022-08-16 RX ORDER — REMDESIVIR 5 MG/ML
INJECTION INTRAVENOUS
Refills: 0 | Status: DISCONTINUED | OUTPATIENT
Start: 2022-08-16 | End: 2022-08-19

## 2022-08-16 RX ORDER — CARVEDILOL PHOSPHATE 80 MG/1
6.25 CAPSULE, EXTENDED RELEASE ORAL EVERY 12 HOURS
Refills: 0 | Status: DISCONTINUED | OUTPATIENT
Start: 2022-08-16 | End: 2022-08-21

## 2022-08-16 RX ORDER — INSULIN LISPRO 100/ML
VIAL (ML) SUBCUTANEOUS
Refills: 0 | Status: DISCONTINUED | OUTPATIENT
Start: 2022-08-16 | End: 2022-08-27

## 2022-08-16 RX ORDER — INSULIN LISPRO 100/ML
VIAL (ML) SUBCUTANEOUS AT BEDTIME
Refills: 0 | Status: DISCONTINUED | OUTPATIENT
Start: 2022-08-16 | End: 2022-08-27

## 2022-08-16 RX ORDER — GLUCAGON INJECTION, SOLUTION 0.5 MG/.1ML
1 INJECTION, SOLUTION SUBCUTANEOUS ONCE
Refills: 0 | Status: DISCONTINUED | OUTPATIENT
Start: 2022-08-16 | End: 2022-08-27

## 2022-08-16 RX ORDER — ACETAMINOPHEN 500 MG
650 TABLET ORAL ONCE
Refills: 0 | Status: COMPLETED | OUTPATIENT
Start: 2022-08-16 | End: 2022-08-16

## 2022-08-16 RX ORDER — ENOXAPARIN SODIUM 100 MG/ML
40 INJECTION SUBCUTANEOUS EVERY 24 HOURS
Refills: 0 | Status: DISCONTINUED | OUTPATIENT
Start: 2022-08-16 | End: 2022-08-27

## 2022-08-16 RX ORDER — LISINOPRIL 2.5 MG/1
20 TABLET ORAL DAILY
Refills: 0 | Status: DISCONTINUED | OUTPATIENT
Start: 2022-08-16 | End: 2022-08-27

## 2022-08-16 RX ORDER — ATORVASTATIN CALCIUM 80 MG/1
40 TABLET, FILM COATED ORAL AT BEDTIME
Refills: 0 | Status: DISCONTINUED | OUTPATIENT
Start: 2022-08-16 | End: 2022-08-27

## 2022-08-16 RX ORDER — DEXTROSE 50 % IN WATER 50 %
15 SYRINGE (ML) INTRAVENOUS ONCE
Refills: 0 | Status: DISCONTINUED | OUTPATIENT
Start: 2022-08-16 | End: 2022-08-27

## 2022-08-16 RX ORDER — REMDESIVIR 5 MG/ML
200 INJECTION INTRAVENOUS EVERY 24 HOURS
Refills: 0 | Status: COMPLETED | OUTPATIENT
Start: 2022-08-16 | End: 2022-08-16

## 2022-08-16 RX ORDER — ASPIRIN/CALCIUM CARB/MAGNESIUM 324 MG
81 TABLET ORAL DAILY
Refills: 0 | Status: DISCONTINUED | OUTPATIENT
Start: 2022-08-16 | End: 2022-08-27

## 2022-08-16 RX ORDER — TAMSULOSIN HYDROCHLORIDE 0.4 MG/1
0.4 CAPSULE ORAL AT BEDTIME
Refills: 0 | Status: DISCONTINUED | OUTPATIENT
Start: 2022-08-16 | End: 2022-08-27

## 2022-08-16 RX ORDER — DEXTROSE 50 % IN WATER 50 %
12.5 SYRINGE (ML) INTRAVENOUS ONCE
Refills: 0 | Status: DISCONTINUED | OUTPATIENT
Start: 2022-08-16 | End: 2022-08-27

## 2022-08-16 RX ORDER — REMDESIVIR 5 MG/ML
100 INJECTION INTRAVENOUS EVERY 24 HOURS
Refills: 0 | Status: DISCONTINUED | OUTPATIENT
Start: 2022-08-17 | End: 2022-08-19

## 2022-08-16 RX ADMIN — ENOXAPARIN SODIUM 40 MILLIGRAM(S): 100 INJECTION SUBCUTANEOUS at 15:16

## 2022-08-16 RX ADMIN — ATORVASTATIN CALCIUM 40 MILLIGRAM(S): 80 TABLET, FILM COATED ORAL at 22:03

## 2022-08-16 RX ADMIN — REMDESIVIR 500 MILLIGRAM(S): 5 INJECTION INTRAVENOUS at 16:32

## 2022-08-16 RX ADMIN — Medication 650 MILLIGRAM(S): at 16:34

## 2022-08-16 RX ADMIN — TAMSULOSIN HYDROCHLORIDE 0.4 MILLIGRAM(S): 0.4 CAPSULE ORAL at 22:04

## 2022-08-16 RX ADMIN — LISINOPRIL 20 MILLIGRAM(S): 2.5 TABLET ORAL at 15:16

## 2022-08-16 RX ADMIN — CARVEDILOL PHOSPHATE 6.25 MILLIGRAM(S): 80 CAPSULE, EXTENDED RELEASE ORAL at 17:11

## 2022-08-16 RX ADMIN — Medication 650 MILLIGRAM(S): at 22:03

## 2022-08-16 RX ADMIN — Medication 650 MILLIGRAM(S): at 15:27

## 2022-08-16 RX ADMIN — Medication 650 MILLIGRAM(S): at 03:15

## 2022-08-16 RX ADMIN — Medication 81 MILLIGRAM(S): at 15:16

## 2022-08-16 RX ADMIN — Medication 650 MILLIGRAM(S): at 23:00

## 2022-08-16 NOTE — ED PROVIDER NOTE - PROGRESS NOTE DETAILS
Pt signed out to me at 7am. Last set of vitals at 0146 show , temp 100.9 and O2 sat 96% on RA. Pt presented with altered mental statusx1 day per family and was found to be febrile and COVID+, labs otherwise non-actionable. CXR clear. Since arrival he has received 650mg Tylenol PO. Pending CT HEad for encephalopathy, to be admitted. Nain WILSON, EM/IM PGY-2: Pt signed out to me at 7am. Last set of vitals at 0146 show , temp 100.9 and O2 sat 96% on RA. Pt presented with altered mental statusx1 day per family and was found to be febrile and COVID+, labs otherwise non-actionable. CXR clear. Since arrival he has received 650mg Tylenol PO. Pending CT HEad for encephalopathy, to be admitted. Nain WILSON, EM/IM PGY-2: Pt signed out to me at 7am. Last set of vitals at 0146 show , temp 100.9 and O2 sat 96% on RA. Pt presented with altered mental statusx1 day per family and was found to be febrile and COVID+, labs otherwise non-actionable. CXR clear. Since arrival he has received 650mg Tylenol PO. Pending CT Head for encephalopathy, to be admitted. Nain WILSON, EM/IM PGY-2: CT Head negative for acute changes, chronic old strokes. Admitted to Dr. Kaur

## 2022-08-16 NOTE — ED ADULT TRIAGE NOTE - CHIEF COMPLAINT QUOTE
Pt arrives to ED via EMS with report of fever, cough and family states pt acting different than normal.  Pt LKW was 8 pm however they state he woke up yesterday morning more quiet than usual.  Hx of "mini stroke/ TIA?)" with no residuals.  Pt febrile in triage with cough.  Pt French speaking with son translating.

## 2022-08-16 NOTE — H&P ADULT - NSICDXPASTMEDICALHX_GEN_ALL_CORE_FT
PAST MEDICAL HISTORY:  CVA (cerebrovascular accident)     History of BPH     HTN (hypertension)     Type 2 diabetes mellitus

## 2022-08-16 NOTE — H&P ADULT - NSHPPHYSICALEXAM_GEN_ALL_CORE
GENERAL: NAD, well-developed elderly male  HEAD:  Atraumatic, Normocephalic  EYES: EOMI, PERRLA, conjunctiva and sclera clear  NECK: Supple, No JVD  CHEST/LUNG: Clear to auscultation bilaterally; No wheeze, rhonchi, crackles or rales  HEART: Regular rate and rhythm; No murmurs, rubs, or gallops  ABDOMEN: Soft, Nontender, Nondistended; Bowel sounds present  EXTREMITIES:  2+ Peripheral Pulses, No clubbing, cyanosis, or edema  PSYCH: AAOx0  NEUROLOGY: non-focal  SKIN: No rashes or lesions

## 2022-08-16 NOTE — H&P ADULT - ASSESSMENT
71 y/o M hx of DM, HTN, CVA (apx 7640-6540) w/o residual deficits, ? HF p/w AMS, cough and decreased PO intake, likely 2/2 metabolic encephalopathy iso COVID infection

## 2022-08-16 NOTE — ED ADULT NURSE NOTE - CHIEF COMPLAINT QUOTE
Pt arrives to ED via EMS with report of fever, cough and family states pt acting different than normal.  Pt LKW was 8 pm however they state he woke up yesterday morning more quiet than usual.  Hx of "mini stroke/ TIA?)" with no residuals.  Pt febrile in triage with cough.  Pt Kazakh speaking with son translating.

## 2022-08-16 NOTE — ED ADULT NURSE NOTE - NSIMPLEMENTINTERV_GEN_ALL_ED
Implemented All Fall with Harm Risk Interventions:  [place] to call system. Call bell, personal items and telephone within reach. Instruct patient to call for assistance. Room bathroom lighting operational. Non-slip footwear when patient is off stretcher. Physically safe environment: no spills, clutter or unnecessary equipment. Stretcher in lowest position, wheels locked, appropriate side rails in place. Provide visual cue, wrist band, yellow gown, etc. Monitor gait and stability. Monitor for mental status changes and reorient to person, place, and time. Review medications for side effects contributing to fall risk. Reinforce activity limits and safety measures with patient and family. Provide visual clues: red socks.

## 2022-08-16 NOTE — H&P ADULT - PROBLEM SELECTOR PLAN 1
Suspect presentation likely metabolic encephalopathy in setting of COVID infection. R/O UTI given polyuria  -start remdesivir 3-5 days pending clinical response  -Monitor hepatic panel on tx  -No indication for decadron at this time as pt currently saturating well on RA  -Obtain | trend inflammatory markers: LDH, Ferritin, CRP , D-dimer  -Check UA/urine culture  -F/u blood Cx x 2  -Trend fever curve  -May benefit from LP/meningitis/encephalitis work up if not improving w/ tx, though less suspicious at this time

## 2022-08-16 NOTE — H&P ADULT - PROBLEM SELECTOR PLAN 6
-Unclear HF history. Son states pt gets furosemide as needed for lower leg swelling but is unable to further elaborate.  -Obtain further collateral from family / PCP  -Appears euvolemic at this time  -Hold Lasix, may resume as needed

## 2022-08-16 NOTE — H&P ADULT - PROBLEM SELECTOR PLAN 3
-CT head sig for chronic microvascular changes/chronic lacunar infarcts. Son reports pt had a "stroke" 1-2 years ago. On ASA at home (ascard)  -c/w home aspirin  -Start atorvastatin 40mg QD  -Check lipid panel

## 2022-08-16 NOTE — H&P ADULT - NSHPLABSRESULTS_GEN_ALL_CORE
14.0   8.54  )-----------( 138      ( 16 Aug 2022 03:55 )             41.8       08-16    134<L>  |  99  |  19  ----------------------------<  174<H>  4.3   |  21<L>  |  1.22    Ca    10.1      16 Aug 2022 03:55    TPro  7.5  /  Alb  4.4  /  TBili  0.6  /  DBili  x   /  AST  24  /  ALT  12  /  AlkPhos  88  08-16                  PT/INR - ( 16 Aug 2022 05:05 )   PT: 14.6 sec;   INR: 1.26 ratio         PTT - ( 16 Aug 2022 05:05 )  PTT:31.3 sec          CAPILLARY BLOOD GLUCOSE      POCT Blood Glucose.: 194 mg/dL (16 Aug 2022 12:04)              RADIOLOGY & ADDITIONAL TESTS:    Imaging personally reviewed.    Consultant(s) notes reviewed.    Care discussed with consultants and other providers.

## 2022-08-16 NOTE — PATIENT PROFILE ADULT - VISION (WITH CORRECTIVE LENSES IF THE PATIENT USUALLY WEARS THEM):
Pt wears glasses to see/Partially impaired: cannot see medication labels or newsprint, but can see obstacles in path, and the surrounding layout; can count fingers at arm's length

## 2022-08-16 NOTE — PATIENT PROFILE ADULT - FALL HARM RISK - HARM RISK INTERVENTIONS
Assistance with ambulation/Assistance OOB with selected safe patient handling equipment/Communicate Risk of Fall with Harm to all staff/Discuss with provider need for PT consult/Monitor for mental status changes/Monitor gait and stability/Move patient closer to nurses' station/Provide patient with walking aids - walker, cane, crutches/Reinforce activity limits and safety measures with patient and family/Reorient to person, place and time as needed/Tailored Fall Risk Interventions/Toileting schedule using arm’s reach rule for commode and bathroom/Use of alarms - bed, chair and/or voice tab/Visual Cue: Yellow wristband and red socks/Bed in lowest position, wheels locked, appropriate side rails in place/Call bell, personal items and telephone in reach/Instruct patient to call for assistance before getting out of bed or chair/Non-slip footwear when patient is out of bed/[place] to call system/Physically safe environment - no spills, clutter or unnecessary equipment/Purposeful Proactive Rounding/Room/bathroom lighting operational, light cord in reach

## 2022-08-16 NOTE — ED PROVIDER NOTE - ATTENDING CONTRIBUTION TO CARE
I performed a face-to-face evaluation of the patient and performed a history and physical examination along with the resident or ACP, and/or medical student above.  I agree with the history and physical examination as documented by the resident or ACP, and/or medical student above.  [place]:  71yo M w/ pmh as above, bibems from home, accompanied by son, for ams and fever x 1day, general malaise and weakness, recently returned from [place], and +sick contact at home. Labs, ua for infectious w/u, CT head, TBA.

## 2022-08-16 NOTE — ED ADULT NURSE REASSESSMENT NOTE - NS ED NURSE REASSESS COMMENT FT1
Report given to ROBBY Warren from [facility] for continuity of care. Safety maintained.

## 2022-08-16 NOTE — H&P ADULT - HISTORY OF PRESENT ILLNESS
73 y/o M hx of DM, HTN, CVA (apx 8930-5659) w/o residual deficits, ? HF p/w fever + AMS x 1 day. Pt at the time of interview unable to provide history even w/ an  (#[id number]). Per discussion w/ son, pt was noted to be refusing oral intake yesterday. Son states he only ate a little bit of his breakfast and lunch yesterday. Family also noted that he appeared more fatigued and unsteady while walking. At night, son noted patient was confused, also did not eat very much and had developed a dry cough. Son reports father has a chronic dry cough however was noted to be cough a lot more frequently, prompting family to bring pt into the E.D. ROS otherwise positive for polyuria which son states has been ongoing x 4-5 months. ROS otherwise negative. Per son, pt has not been complaining of any fevers, chills, no nausea, vomiting, chest pain, difficulty breath. Reports chronic intermittent constipation but without abdominal pain.     Son states pt lives with himself and his wife. Wife is currently also sick at home with fevers and a cough. Of note, pt recently returned from [place] in May 2022 after living there for a year.     In the E.D, vitals sig for T 100.9, otherwise hemodynamically stable. COVID positive. CTH w/ chronic microvascular changes/chronic lacunar infarcts. Pt admitted to medicine for further management.

## 2022-08-16 NOTE — ED PROVIDER NOTE - CLINICAL SUMMARY MEDICAL DECISION MAKING FREE TEXT BOX
72 M w/ hx of DM, HTN, ? HF p/w fever and AMS likely metabolic encephalopathy due to infectious process. Labs, cx, urine, CTH, rvp, Dispo pending.

## 2022-08-16 NOTE — ED ADULT NURSE REASSESSMENT NOTE - NS ED NURSE REASSESS COMMENT FT1
Received patient in room 11 waiting to be admitted. Patient resting in stretcher, no distress noted. Patient is A&OX1, airway patent, breathing unlabored and even, radial pulses palpable, abdomen soft, nontender, skin warm, dry. Patient in on airborne precaution and fall precaution in place. Side rails up and safety maintained. Received patient in room 11 waiting to be admitted. Patient resting in stretcher, no distress noted. Patient is A&OX1, airway patent, breathing unlabored and even, radial pulses palpable, abdomen soft, nontender, skin warm, dry. Patient is on airborne precaution for covid positive and fall precaution in place. Side rails up and safety maintained. Received patient in room 11 waiting to be admitted. Patient resting in stretcher, no distress noted. Patient is A&OX1, confused, airway patent, breathing unlabored and even, radial pulses palpable, abdomen soft, nontender, skin warm, dry. Patient is on airborne precaution for covid positive and fall precaution in place. Side rails up and safety maintained. Received patient in room 11 waiting for CT scan. Patient resting in stretcher, no distress noted. Patient is A&OX1, confused, airway patent, breathing unlabored and even, radial pulses palpable, abdomen soft, nontender, skin warm, dry. Patient is on airborne/contact/droplet precaution for covid positive and fall precaution in place. Side rails up and safety maintained.

## 2022-08-16 NOTE — ED PROVIDER NOTE - NS ED ROS FT
General: +fever, chills  HENT: +nasal congestion, no sore throat  Eyes: no visual changes, no blurred vision  Neck: no neck pain  CV: denies chest pain, no palpitations  Resp: no difficulty breathing, no cough  Abdominal: no nausea, no vomiting, no diarrhea, no abdominal pain  MSK: no muscle aches  Neuro: no headache  Skin: no rashes

## 2022-08-16 NOTE — ED PROVIDER NOTE - OBJECTIVE STATEMENT
71 y/o M hx of DM, HTN, ? HF p/w fever + AMS x 1 day. Son reports patient has been more tired than usual not participating in normal activities. Maryuri MS AAOx3. Reports recently returned from [place] in may. +Sick contact at home w/ similar sx. + cough/congestion. Patient only complains of generalized weakness but otherwise no other complaints.

## 2022-08-16 NOTE — ED ADULT NURSE NOTE - OBJECTIVE STATEMENT
Pt arrives to ED via EMS with report of fever, cough and family states pt acting different than normal.  Pt LKW was 8 pm however they state he woke up yesterday morning more quiet than usual.  Hx of "mini stroke/ TIA?)" with no residuals.  Pt febrile in triage with cough.  Pt Lithuanian speaking with son translating.

## 2022-08-16 NOTE — ED PROVIDER NOTE - PHYSICAL EXAMINATION
General: elderly appearing   HEENT: neck supple, anicteric sclera  Cardiovascular: Normal s1, s2, RRR  Respiratory: crackles at base of lungs b/l   Abdominal: Soft, ntnd  Extremities: No swelling in LEs  Neurologic: Non focal  Psych: Awake, alert; AAOx2

## 2022-08-17 LAB
ALBUMIN SERPL ELPH-MCNC: 4.1 G/DL — SIGNIFICANT CHANGE UP (ref 3.3–5)
ALP SERPL-CCNC: 90 U/L — SIGNIFICANT CHANGE UP (ref 40–120)
ALT FLD-CCNC: 19 U/L — SIGNIFICANT CHANGE UP (ref 4–41)
ANION GAP SERPL CALC-SCNC: 12 MMOL/L — SIGNIFICANT CHANGE UP (ref 7–14)
APPEARANCE UR: CLEAR — SIGNIFICANT CHANGE UP
AST SERPL-CCNC: 22 U/L — SIGNIFICANT CHANGE UP (ref 4–40)
BILIRUB SERPL-MCNC: 0.4 MG/DL — SIGNIFICANT CHANGE UP (ref 0.2–1.2)
BILIRUB UR-MCNC: NEGATIVE — SIGNIFICANT CHANGE UP
BUN SERPL-MCNC: 19 MG/DL — SIGNIFICANT CHANGE UP (ref 7–23)
CALCIUM SERPL-MCNC: 9.8 MG/DL — SIGNIFICANT CHANGE UP (ref 8.4–10.5)
CHLORIDE SERPL-SCNC: 99 MMOL/L — SIGNIFICANT CHANGE UP (ref 98–107)
CHOLEST SERPL-MCNC: 103 MG/DL — SIGNIFICANT CHANGE UP
CO2 SERPL-SCNC: 24 MMOL/L — SIGNIFICANT CHANGE UP (ref 22–31)
COLOR SPEC: SIGNIFICANT CHANGE UP
CREAT SERPL-MCNC: 1.32 MG/DL — HIGH (ref 0.5–1.3)
D DIMER BLD IA.RAPID-MCNC: 212 NG/ML DDU — SIGNIFICANT CHANGE UP
DIFF PNL FLD: NEGATIVE — SIGNIFICANT CHANGE UP
EGFR: 57 ML/MIN/1.73M2 — LOW
FERRITIN SERPL-MCNC: 103 NG/ML — SIGNIFICANT CHANGE UP (ref 30–400)
GLUCOSE BLDC GLUCOMTR-MCNC: 130 MG/DL — HIGH (ref 70–99)
GLUCOSE BLDC GLUCOMTR-MCNC: 145 MG/DL — HIGH (ref 70–99)
GLUCOSE BLDC GLUCOMTR-MCNC: 178 MG/DL — HIGH (ref 70–99)
GLUCOSE BLDC GLUCOMTR-MCNC: 187 MG/DL — HIGH (ref 70–99)
GLUCOSE SERPL-MCNC: 135 MG/DL — HIGH (ref 70–99)
GLUCOSE UR QL: NEGATIVE — SIGNIFICANT CHANGE UP
HCT VFR BLD CALC: 44 % — SIGNIFICANT CHANGE UP (ref 39–50)
HCV AB S/CO SERPL IA: 0.14 S/CO — SIGNIFICANT CHANGE UP (ref 0–0.99)
HCV AB SERPL-IMP: SIGNIFICANT CHANGE UP
HDLC SERPL-MCNC: 39 MG/DL — LOW
HGB BLD-MCNC: 14.2 G/DL — SIGNIFICANT CHANGE UP (ref 13–17)
INR BLD: 1.25 RATIO — HIGH (ref 0.88–1.16)
KETONES UR-MCNC: NEGATIVE — SIGNIFICANT CHANGE UP
LDH SERPL L TO P-CCNC: 184 U/L — SIGNIFICANT CHANGE UP (ref 135–225)
LEUKOCYTE ESTERASE UR-ACNC: NEGATIVE — SIGNIFICANT CHANGE UP
LIPID PNL WITH DIRECT LDL SERPL: 49 MG/DL — SIGNIFICANT CHANGE UP
MAGNESIUM SERPL-MCNC: 1.9 MG/DL — SIGNIFICANT CHANGE UP (ref 1.6–2.6)
MCHC RBC-ENTMCNC: 28.8 PG — SIGNIFICANT CHANGE UP (ref 27–34)
MCHC RBC-ENTMCNC: 32.3 GM/DL — SIGNIFICANT CHANGE UP (ref 32–36)
MCV RBC AUTO: 89.2 FL — SIGNIFICANT CHANGE UP (ref 80–100)
NITRITE UR-MCNC: NEGATIVE — SIGNIFICANT CHANGE UP
NON HDL CHOLESTEROL: 64 MG/DL — SIGNIFICANT CHANGE UP
NRBC # BLD: 0 /100 WBCS — SIGNIFICANT CHANGE UP (ref 0–0)
NRBC # FLD: 0 K/UL — SIGNIFICANT CHANGE UP (ref 0–0)
PH UR: 6 — SIGNIFICANT CHANGE UP (ref 5–8)
PHOSPHATE SERPL-MCNC: 2.8 MG/DL — SIGNIFICANT CHANGE UP (ref 2.5–4.5)
PLATELET # BLD AUTO: 152 K/UL — SIGNIFICANT CHANGE UP (ref 150–400)
POTASSIUM SERPL-MCNC: 4.2 MMOL/L — SIGNIFICANT CHANGE UP (ref 3.5–5.3)
POTASSIUM SERPL-SCNC: 4.2 MMOL/L — SIGNIFICANT CHANGE UP (ref 3.5–5.3)
PROT SERPL-MCNC: 7.4 G/DL — SIGNIFICANT CHANGE UP (ref 6–8.3)
PROT UR-MCNC: NEGATIVE — SIGNIFICANT CHANGE UP
PROTHROM AB SERPL-ACNC: 14.5 SEC — HIGH (ref 10.5–13.4)
RBC # BLD: 4.93 M/UL — SIGNIFICANT CHANGE UP (ref 4.2–5.8)
RBC # FLD: 11.7 % — SIGNIFICANT CHANGE UP (ref 10.3–14.5)
SODIUM SERPL-SCNC: 135 MMOL/L — SIGNIFICANT CHANGE UP (ref 135–145)
SP GR SPEC: 1.01 — SIGNIFICANT CHANGE UP (ref 1.01–1.05)
TRIGL SERPL-MCNC: 74 MG/DL — SIGNIFICANT CHANGE UP
UROBILINOGEN FLD QL: SIGNIFICANT CHANGE UP
WBC # BLD: 9.76 K/UL — SIGNIFICANT CHANGE UP (ref 3.8–10.5)
WBC # FLD AUTO: 9.76 K/UL — SIGNIFICANT CHANGE UP (ref 3.8–10.5)

## 2022-08-17 RX ORDER — ACETAMINOPHEN 500 MG
1000 TABLET ORAL ONCE
Refills: 0 | Status: COMPLETED | OUTPATIENT
Start: 2022-08-17 | End: 2022-08-17

## 2022-08-17 RX ADMIN — REMDESIVIR 500 MILLIGRAM(S): 5 INJECTION INTRAVENOUS at 15:34

## 2022-08-17 RX ADMIN — ATORVASTATIN CALCIUM 40 MILLIGRAM(S): 80 TABLET, FILM COATED ORAL at 21:19

## 2022-08-17 RX ADMIN — Medication 1000 MILLIGRAM(S): at 06:42

## 2022-08-17 RX ADMIN — CARVEDILOL PHOSPHATE 6.25 MILLIGRAM(S): 80 CAPSULE, EXTENDED RELEASE ORAL at 17:23

## 2022-08-17 RX ADMIN — ENOXAPARIN SODIUM 40 MILLIGRAM(S): 100 INJECTION SUBCUTANEOUS at 14:35

## 2022-08-17 RX ADMIN — Medication 400 MILLIGRAM(S): at 06:06

## 2022-08-17 RX ADMIN — Medication 1: at 17:25

## 2022-08-17 RX ADMIN — Medication 81 MILLIGRAM(S): at 11:41

## 2022-08-17 RX ADMIN — Medication 650 MILLIGRAM(S): at 19:36

## 2022-08-17 RX ADMIN — TAMSULOSIN HYDROCHLORIDE 0.4 MILLIGRAM(S): 0.4 CAPSULE ORAL at 21:18

## 2022-08-17 RX ADMIN — Medication 650 MILLIGRAM(S): at 18:36

## 2022-08-17 NOTE — PROVIDER CONTACT NOTE (OTHER) - BACKGROUND
Pt admitted with AMS + fever; found to be covid positive. Increasingly aggitated & currently watched by EC. Pt admitted with AMS + fever; found to be covid positive. Increasingly agitated & currently watched by EC.

## 2022-08-17 NOTE — PROVIDER CONTACT NOTE (OTHER) - BACKGROUND
Pt admitted with AMS + fever; found to be covid positive. Increasingly aggitated & currently watched by EC.

## 2022-08-17 NOTE — PROVIDER CONTACT NOTE (OTHER) - ACTION/TREATMENT ORDERED:
ACP made aware; IV tylenol ordered & AM PO meds held GREYSON Pickens made aware; IV tylenol ordered & AM PO meds due to no new meds ordered IV.

## 2022-08-17 NOTE — PROVIDER CONTACT NOTE (OTHER) - ASSESSMENT
Pt vitals checked prior to administration of 6.25mg coreg, resulted 161/109, Hr 89. Pt asymptomatic.

## 2022-08-17 NOTE — PROVIDER CONTACT NOTE (OTHER) - BACKGROUND
(Admit Diagnosis) Fever due to unspecified condition  (PMH) History of BPH  (PMH) HTN (hypertension)  (PMH) CVA (cerebrovascular accident)  (PMH) Type 2 diabetes mellitus

## 2022-08-18 LAB
ANION GAP SERPL CALC-SCNC: 9 MMOL/L — SIGNIFICANT CHANGE UP (ref 7–14)
BASOPHILS # BLD AUTO: 0.04 K/UL — SIGNIFICANT CHANGE UP (ref 0–0.2)
BASOPHILS NFR BLD AUTO: 0.4 % — SIGNIFICANT CHANGE UP (ref 0–2)
BUN SERPL-MCNC: 17 MG/DL — SIGNIFICANT CHANGE UP (ref 7–23)
CALCIUM SERPL-MCNC: 9.3 MG/DL — SIGNIFICANT CHANGE UP (ref 8.4–10.5)
CHLORIDE SERPL-SCNC: 105 MMOL/L — SIGNIFICANT CHANGE UP (ref 98–107)
CO2 SERPL-SCNC: 23 MMOL/L — SIGNIFICANT CHANGE UP (ref 22–31)
CREAT SERPL-MCNC: 1.19 MG/DL — SIGNIFICANT CHANGE UP (ref 0.5–1.3)
EGFR: 65 ML/MIN/1.73M2 — SIGNIFICANT CHANGE UP
EOSINOPHIL # BLD AUTO: 0.27 K/UL — SIGNIFICANT CHANGE UP (ref 0–0.5)
EOSINOPHIL NFR BLD AUTO: 2.6 % — SIGNIFICANT CHANGE UP (ref 0–6)
GLUCOSE BLDC GLUCOMTR-MCNC: 132 MG/DL — HIGH (ref 70–99)
GLUCOSE BLDC GLUCOMTR-MCNC: 134 MG/DL — HIGH (ref 70–99)
GLUCOSE BLDC GLUCOMTR-MCNC: 142 MG/DL — HIGH (ref 70–99)
GLUCOSE BLDC GLUCOMTR-MCNC: 145 MG/DL — HIGH (ref 70–99)
GLUCOSE SERPL-MCNC: 141 MG/DL — HIGH (ref 70–99)
HCT VFR BLD CALC: 40.4 % — SIGNIFICANT CHANGE UP (ref 39–50)
HGB BLD-MCNC: 13.2 G/DL — SIGNIFICANT CHANGE UP (ref 13–17)
IANC: 6.26 K/UL — SIGNIFICANT CHANGE UP (ref 1.8–7.4)
IMM GRANULOCYTES NFR BLD AUTO: 0.3 % — SIGNIFICANT CHANGE UP (ref 0–1.5)
LYMPHOCYTES # BLD AUTO: 2.47 K/UL — SIGNIFICANT CHANGE UP (ref 1–3.3)
LYMPHOCYTES # BLD AUTO: 23.7 % — SIGNIFICANT CHANGE UP (ref 13–44)
MAGNESIUM SERPL-MCNC: 2 MG/DL — SIGNIFICANT CHANGE UP (ref 1.6–2.6)
MCHC RBC-ENTMCNC: 29.3 PG — SIGNIFICANT CHANGE UP (ref 27–34)
MCHC RBC-ENTMCNC: 32.7 GM/DL — SIGNIFICANT CHANGE UP (ref 32–36)
MCV RBC AUTO: 89.8 FL — SIGNIFICANT CHANGE UP (ref 80–100)
MONOCYTES # BLD AUTO: 1.35 K/UL — HIGH (ref 0–0.9)
MONOCYTES NFR BLD AUTO: 13 % — SIGNIFICANT CHANGE UP (ref 2–14)
NEUTROPHILS # BLD AUTO: 6.26 K/UL — SIGNIFICANT CHANGE UP (ref 1.8–7.4)
NEUTROPHILS NFR BLD AUTO: 60 % — SIGNIFICANT CHANGE UP (ref 43–77)
NRBC # BLD: 0 /100 WBCS — SIGNIFICANT CHANGE UP (ref 0–0)
NRBC # FLD: 0 K/UL — SIGNIFICANT CHANGE UP (ref 0–0)
PHOSPHATE SERPL-MCNC: 2.6 MG/DL — SIGNIFICANT CHANGE UP (ref 2.5–4.5)
PLATELET # BLD AUTO: 137 K/UL — LOW (ref 150–400)
POTASSIUM SERPL-MCNC: 4.3 MMOL/L — SIGNIFICANT CHANGE UP (ref 3.5–5.3)
POTASSIUM SERPL-SCNC: 4.3 MMOL/L — SIGNIFICANT CHANGE UP (ref 3.5–5.3)
RBC # BLD: 4.5 M/UL — SIGNIFICANT CHANGE UP (ref 4.2–5.8)
RBC # FLD: 11.9 % — SIGNIFICANT CHANGE UP (ref 10.3–14.5)
SODIUM SERPL-SCNC: 137 MMOL/L — SIGNIFICANT CHANGE UP (ref 135–145)
WBC # BLD: 10.42 K/UL — SIGNIFICANT CHANGE UP (ref 3.8–10.5)
WBC # FLD AUTO: 10.42 K/UL — SIGNIFICANT CHANGE UP (ref 3.8–10.5)

## 2022-08-18 RX ADMIN — REMDESIVIR 500 MILLIGRAM(S): 5 INJECTION INTRAVENOUS at 16:10

## 2022-08-18 RX ADMIN — Medication 81 MILLIGRAM(S): at 09:27

## 2022-08-18 RX ADMIN — LISINOPRIL 20 MILLIGRAM(S): 2.5 TABLET ORAL at 12:14

## 2022-08-18 RX ADMIN — CARVEDILOL PHOSPHATE 6.25 MILLIGRAM(S): 80 CAPSULE, EXTENDED RELEASE ORAL at 09:27

## 2022-08-18 RX ADMIN — TAMSULOSIN HYDROCHLORIDE 0.4 MILLIGRAM(S): 0.4 CAPSULE ORAL at 21:35

## 2022-08-18 RX ADMIN — ATORVASTATIN CALCIUM 40 MILLIGRAM(S): 80 TABLET, FILM COATED ORAL at 21:36

## 2022-08-18 RX ADMIN — CARVEDILOL PHOSPHATE 6.25 MILLIGRAM(S): 80 CAPSULE, EXTENDED RELEASE ORAL at 21:37

## 2022-08-18 RX ADMIN — ENOXAPARIN SODIUM 40 MILLIGRAM(S): 100 INJECTION SUBCUTANEOUS at 12:11

## 2022-08-18 NOTE — EEG REPORT - NS EEG TEXT BOX
SID PINTO [id number]     Study Date: 		08-18-22    --------------------------------------------------------------------------------------------------  History:  CC/ HPI Patient is a 72y old  Male who presents with a chief complaint of AMS (17 Aug 2022 11:38)    MEDICATIONS  (STANDING):  aspirin enteric coated 81 milliGRAM(s) Oral daily  atorvastatin 40 milliGRAM(s) Oral at bedtime  carvedilol 6.25 milliGRAM(s) Oral every 12 hours  dextrose 50% Injectable 25 Gram(s) IV Push once  dextrose 50% Injectable 12.5 Gram(s) IV Push once  dextrose 50% Injectable 25 Gram(s) IV Push once  dextrose Oral Gel 15 Gram(s) Oral once  enoxaparin Injectable 40 milliGRAM(s) SubCutaneous every 24 hours  glucagon  Injectable 1 milliGRAM(s) IntraMuscular once  insulin lispro (ADMELOG) corrective regimen sliding scale   SubCutaneous three times a day before meals  insulin lispro (ADMELOG) corrective regimen sliding scale   SubCutaneous at bedtime  lisinopril 20 milliGRAM(s) Oral daily  remdesivir  IVPB   IV Intermittent   remdesivir  IVPB 100 milliGRAM(s) IV Intermittent every 24 hours  tamsulosin 0.4 milliGRAM(s) Oral at bedtime    --------------------------------------------------------------------------------------------------  Study Interpretation:    [Abbreviation Key:  PDR=alpha rhythm/posterior dominant rhythm. A-P=anterior posterior.  Amplitude: ‘very low’:<20; ‘low’:20-49; ‘medium’:; ‘high’:>150uV.  Persistence for periodic/rhythmic patterns (% of epoch) ‘rare’:<1%; ‘occasional’:1-10%; ‘frequent’:10-50%; ‘abundant’:50-90%; ‘continuous’:>90%.  Persistence for sporadic discharges: ‘rare’:<1/hr; ‘occasional’:1/min-1/hr; ‘frequent’:>1/min; ‘abundant’:>1/10 sec.  RPP=rhythmic and periodic patterns; GRDA=generalized rhythmic delta activity; FIRDA=frontal intermittent GRDA; LRDA=lateralized rhythmic delta activity; TIRDA=temporal intermittent rhythmic delta activity;  LPD=PLED=lateralized periodic discharges; GPD=generalized periodic discharges; BIPDs =bilateral independent periodic discharges; Mf=multifocal; SIRPDs=stimulus induced rhythmic, periodic, or ictal appearing discharges; BIRDs=brief potentially ictal rhythmic discharges >4 Hz, lasting .5-10s; PFA (paroxysmal bursts >13 Hz or =8 Hz <10s).  Modifiers: +F=with fast component; +S=with spike component; +R=with rhythmic component.  S-B=burst suppression pattern.  Max=maximal. N1-drowsy; N2-stage II sleep; N3-slow wave sleep. SSS/BETS=small sharp spikes/benign epileptiform transients of sleep. HV=hyperventilation; PS=photic stimulation]    Daily EEG Visual Analysis  FINDINGS:      Background:  Continuity: continuous  Symmetry: symmetric  PDR: 7 Hz activity, with amplitude to 40 uV, that attenuated to eye opening.    Reactivity: present  Voltage: normal (between 20-150uV)  Anterior Posterior Gradient: present  Other background findings: none  Breach: absent    Background Slowing:  Generalized slowing: diffuse irregular theta activity.  Focal slowing: none was present.    State Changes:   -Drowsiness and N2 sleep transients were not recorded.    Sporadic Epileptiform Discharges:    None    Rhythmic and Periodic Patterns (RPPs):  None     Electrographic and Electroclinical seizures:  None    Other Clinical Events:  None    Activation Procedures:   -Hyperventilation was not performed.    -Photic stimulation was performed and did not elicit any abnormalities.       Artifacts:  Intermittent myogenic and movement artifacts were noted.    ECG:  The heart rate on single channel ECG was predominantly 70 BPM.    EEG Classification / Summary:  Abnormal EEG study awake only   Mild generalized background slowing    -----------------------------------------------------------------------------------------------------    Clinical Impression:  Abnormal EEG study  Mild diffuse/multifocal cerebral dysfunction, not specific as to etiology.  There were no epileptiform abnormalities recorded.      Adi Myers MD  Epilepsy Fellow , [facility]  Department of Neurology, [facility]    [facility] EEG Reading Room Ph#: [phone]  Epilepsy Answering Service after 5PM and before 8:30AM: Ph#: [phone]   SID PINTO [id number]     Study Date: 		08-18-22    --------------------------------------------------------------------------------------------------  History:  CC/ HPI Patient is a 72y old  Male who presents with a chief complaint of AMS (17 Aug 2022 11:38)    MEDICATIONS  (STANDING):  aspirin enteric coated 81 milliGRAM(s) Oral daily  atorvastatin 40 milliGRAM(s) Oral at bedtime  carvedilol 6.25 milliGRAM(s) Oral every 12 hours  dextrose 50% Injectable 25 Gram(s) IV Push once  dextrose 50% Injectable 12.5 Gram(s) IV Push once  dextrose 50% Injectable 25 Gram(s) IV Push once  dextrose Oral Gel 15 Gram(s) Oral once  enoxaparin Injectable 40 milliGRAM(s) SubCutaneous every 24 hours  glucagon  Injectable 1 milliGRAM(s) IntraMuscular once  insulin lispro (ADMELOG) corrective regimen sliding scale   SubCutaneous three times a day before meals  insulin lispro (ADMELOG) corrective regimen sliding scale   SubCutaneous at bedtime  lisinopril 20 milliGRAM(s) Oral daily  remdesivir  IVPB   IV Intermittent   remdesivir  IVPB 100 milliGRAM(s) IV Intermittent every 24 hours  tamsulosin 0.4 milliGRAM(s) Oral at bedtime    --------------------------------------------------------------------------------------------------  Study Interpretation:    [Abbreviation Key:  PDR=alpha rhythm/posterior dominant rhythm. A-P=anterior posterior.  Amplitude: ‘very low’:<20; ‘low’:20-49; ‘medium’:; ‘high’:>150uV.  Persistence for periodic/rhythmic patterns (% of epoch) ‘rare’:<1%; ‘occasional’:1-10%; ‘frequent’:10-50%; ‘abundant’:50-90%; ‘continuous’:>90%.  Persistence for sporadic discharges: ‘rare’:<1/hr; ‘occasional’:1/min-1/hr; ‘frequent’:>1/min; ‘abundant’:>1/10 sec.  RPP=rhythmic and periodic patterns; GRDA=generalized rhythmic delta activity; FIRDA=frontal intermittent GRDA; LRDA=lateralized rhythmic delta activity; TIRDA=temporal intermittent rhythmic delta activity;  LPD=PLED=lateralized periodic discharges; GPD=generalized periodic discharges; BIPDs =bilateral independent periodic discharges; Mf=multifocal; SIRPDs=stimulus induced rhythmic, periodic, or ictal appearing discharges; BIRDs=brief potentially ictal rhythmic discharges >4 Hz, lasting .5-10s; PFA (paroxysmal bursts >13 Hz or =8 Hz <10s).  Modifiers: +F=with fast component; +S=with spike component; +R=with rhythmic component.  S-B=burst suppression pattern.  Max=maximal. N1-drowsy; N2-stage II sleep; N3-slow wave sleep. SSS/BETS=small sharp spikes/benign epileptiform transients of sleep. HV=hyperventilation; PS=photic stimulation]    Daily EEG Visual Analysis  FINDINGS:      Background:  Continuity: continuous  Symmetry: symmetric  PDR: 7 Hz activity, with amplitude to 40 uV, that attenuated to eye opening.    Reactivity: present  Voltage: normal (between 20-150uV)  Anterior Posterior Gradient: present  Other background findings: none  Breach: absent    Background Slowing:  Generalized slowing: diffuse irregular theta activity.  Focal slowing: none was present.    State Changes:   -Drowsiness and N2 sleep transients were not recorded.    Sporadic Epileptiform Discharges:    None    Rhythmic and Periodic Patterns (RPPs):  None     Electrographic and Electroclinical seizures:  None    Other Clinical Events:  None    Activation Procedures:   -Hyperventilation was not performed.    -Photic stimulation was performed and did not elicit any abnormalities.       Artifacts:  Intermittent myogenic and movement artifacts were noted.    ECG:  The heart rate on single channel ECG was predominantly 70 BPM.    EEG Classification / Summary:  Abnormal EEG study awake only   Mild generalized background slowing    -----------------------------------------------------------------------------------------------------    Clinical Impression:  Abnormal EEG study  Mild diffuse/multifocal cerebral dysfunction, not specific as to etiology.  There were no epileptiform abnormalities recorded.      Adi Myers MD  Epilepsy Fellow , [facility]  Department of Neurology, [facility]    Manpreet Wilkerson MD  EEG/Epilepsy Attending     [facility] EEG Reading Room Ph#: [phone]  Epilepsy Answering Service after 5PM and before 8:30AM: Ph#: [phone]

## 2022-08-18 NOTE — PROVIDER CONTACT NOTE (OTHER) - RECOMMENDATIONS
PRN Tylenol 650mg PO
As per ACP Keshawn Hollis, reassess BP after 1 hour administration of 6.25mg Coreg
ACP aware - pt educated on importance of medications - will continue to monitor

## 2022-08-18 NOTE — PROVIDER CONTACT NOTE (OTHER) - DATE AND TIME:
18-Aug-2022 05:45
16-Aug-2022 12:16
16-Aug-2022 15:31
17-Aug-2022 05:55
17-Aug-2022 17:25
17-Aug-2022 07:26

## 2022-08-19 LAB
AMMONIA BLD-MCNC: 31 UMOL/L — SIGNIFICANT CHANGE UP (ref 11–55)
ANION GAP SERPL CALC-SCNC: 10 MMOL/L — SIGNIFICANT CHANGE UP (ref 7–14)
BASOPHILS # BLD AUTO: 0.05 K/UL — SIGNIFICANT CHANGE UP (ref 0–0.2)
BASOPHILS NFR BLD AUTO: 0.7 % — SIGNIFICANT CHANGE UP (ref 0–2)
BUN SERPL-MCNC: 22 MG/DL — SIGNIFICANT CHANGE UP (ref 7–23)
CALCIUM SERPL-MCNC: 9 MG/DL — SIGNIFICANT CHANGE UP (ref 8.4–10.5)
CHLORIDE SERPL-SCNC: 106 MMOL/L — SIGNIFICANT CHANGE UP (ref 98–107)
CO2 SERPL-SCNC: 21 MMOL/L — LOW (ref 22–31)
CREAT SERPL-MCNC: 1.12 MG/DL — SIGNIFICANT CHANGE UP (ref 0.5–1.3)
CRP SERPL-MCNC: 46.1 MG/L — HIGH
EGFR: 70 ML/MIN/1.73M2 — SIGNIFICANT CHANGE UP
EOSINOPHIL # BLD AUTO: 0.48 K/UL — SIGNIFICANT CHANGE UP (ref 0–0.5)
EOSINOPHIL NFR BLD AUTO: 6.3 % — HIGH (ref 0–6)
GLUCOSE BLDC GLUCOMTR-MCNC: 152 MG/DL — HIGH (ref 70–99)
GLUCOSE BLDC GLUCOMTR-MCNC: 155 MG/DL — HIGH (ref 70–99)
GLUCOSE BLDC GLUCOMTR-MCNC: 158 MG/DL — HIGH (ref 70–99)
GLUCOSE BLDC GLUCOMTR-MCNC: 165 MG/DL — HIGH (ref 70–99)
GLUCOSE SERPL-MCNC: 193 MG/DL — HIGH (ref 70–99)
HCT VFR BLD CALC: 40.5 % — SIGNIFICANT CHANGE UP (ref 39–50)
HGB BLD-MCNC: 13.3 G/DL — SIGNIFICANT CHANGE UP (ref 13–17)
IANC: 4.15 K/UL — SIGNIFICANT CHANGE UP (ref 1.8–7.4)
IMM GRANULOCYTES NFR BLD AUTO: 0.3 % — SIGNIFICANT CHANGE UP (ref 0–1.5)
LYMPHOCYTES # BLD AUTO: 2.12 K/UL — SIGNIFICANT CHANGE UP (ref 1–3.3)
LYMPHOCYTES # BLD AUTO: 27.6 % — SIGNIFICANT CHANGE UP (ref 13–44)
MAGNESIUM SERPL-MCNC: 2.1 MG/DL — SIGNIFICANT CHANGE UP (ref 1.6–2.6)
MCHC RBC-ENTMCNC: 29.6 PG — SIGNIFICANT CHANGE UP (ref 27–34)
MCHC RBC-ENTMCNC: 32.8 GM/DL — SIGNIFICANT CHANGE UP (ref 32–36)
MCV RBC AUTO: 90.2 FL — SIGNIFICANT CHANGE UP (ref 80–100)
MONOCYTES # BLD AUTO: 0.85 K/UL — SIGNIFICANT CHANGE UP (ref 0–0.9)
MONOCYTES NFR BLD AUTO: 11.1 % — SIGNIFICANT CHANGE UP (ref 2–14)
NEUTROPHILS # BLD AUTO: 4.15 K/UL — SIGNIFICANT CHANGE UP (ref 1.8–7.4)
NEUTROPHILS NFR BLD AUTO: 54 % — SIGNIFICANT CHANGE UP (ref 43–77)
NRBC # BLD: 0 /100 WBCS — SIGNIFICANT CHANGE UP (ref 0–0)
NRBC # FLD: 0 K/UL — SIGNIFICANT CHANGE UP (ref 0–0)
PHOSPHATE SERPL-MCNC: 2.9 MG/DL — SIGNIFICANT CHANGE UP (ref 2.5–4.5)
PLATELET # BLD AUTO: 157 K/UL — SIGNIFICANT CHANGE UP (ref 150–400)
POTASSIUM SERPL-MCNC: 4.1 MMOL/L — SIGNIFICANT CHANGE UP (ref 3.5–5.3)
POTASSIUM SERPL-SCNC: 4.1 MMOL/L — SIGNIFICANT CHANGE UP (ref 3.5–5.3)
PROLACTIN SERPL-MCNC: 11.3 NG/ML — SIGNIFICANT CHANGE UP (ref 4.1–18.4)
RBC # BLD: 4.49 M/UL — SIGNIFICANT CHANGE UP (ref 4.2–5.8)
RBC # FLD: 11.8 % — SIGNIFICANT CHANGE UP (ref 10.3–14.5)
SODIUM SERPL-SCNC: 137 MMOL/L — SIGNIFICANT CHANGE UP (ref 135–145)
WBC # BLD: 7.67 K/UL — SIGNIFICANT CHANGE UP (ref 3.8–10.5)
WBC # FLD AUTO: 7.67 K/UL — SIGNIFICANT CHANGE UP (ref 3.8–10.5)

## 2022-08-19 RX ADMIN — ENOXAPARIN SODIUM 40 MILLIGRAM(S): 100 INJECTION SUBCUTANEOUS at 16:12

## 2022-08-19 RX ADMIN — CARVEDILOL PHOSPHATE 6.25 MILLIGRAM(S): 80 CAPSULE, EXTENDED RELEASE ORAL at 16:12

## 2022-08-19 RX ADMIN — CARVEDILOL PHOSPHATE 6.25 MILLIGRAM(S): 80 CAPSULE, EXTENDED RELEASE ORAL at 05:10

## 2022-08-19 RX ADMIN — Medication 1: at 12:27

## 2022-08-19 RX ADMIN — ATORVASTATIN CALCIUM 40 MILLIGRAM(S): 80 TABLET, FILM COATED ORAL at 21:06

## 2022-08-19 RX ADMIN — Medication 1: at 08:47

## 2022-08-19 RX ADMIN — Medication 81 MILLIGRAM(S): at 08:48

## 2022-08-19 RX ADMIN — TAMSULOSIN HYDROCHLORIDE 0.4 MILLIGRAM(S): 0.4 CAPSULE ORAL at 21:06

## 2022-08-19 RX ADMIN — LISINOPRIL 20 MILLIGRAM(S): 2.5 TABLET ORAL at 05:10

## 2022-08-19 RX ADMIN — Medication 1: at 16:12

## 2022-08-19 NOTE — PHYSICAL THERAPY INITIAL EVALUATION ADULT - ADDITIONAL COMMENTS
Unable to obtain accurate social history secondary to pt. presenting with confusion and lethargy during subjective history and presenting with difficulty following commands, limited social history obtained through past medical documents, please refer to case management/social work for more attainable social history. as per documents, pt. lives with family. Pt has a cane at bedside (unable to determine assistance level needed for functional mobility)     Pt. left comfortable in bed with all tubes/lines intact, call bell in reach and in NAD.

## 2022-08-19 NOTE — PHARMACOTHERAPY INTERVENTION NOTE - COMMENTS
71 yo M with COVID-19, not requiring supplemental oxygen at any point in hospitalization.  Initiated on remdesivir on 8/16/2022.    Recommendation(s):  1)  Patient completed 3 days of remdeisivir.  There is no need to extend to 5 days, as patient is not requiring supplemental oxygen.    Lorraine Lerma, PharmD, [facility]  Clinical Pharmacy Specialist, Infectious Diseases  Spectra extension [phone]  .

## 2022-08-19 NOTE — PHYSICAL THERAPY INITIAL EVALUATION ADULT - NSPTDISCHREC_GEN_A_CORE
anticipated discharge to rehab facility to address current functional limitation to optimize safety to allow pt. to reach their optimal level of function.

## 2022-08-20 LAB
ANION GAP SERPL CALC-SCNC: 10 MMOL/L — SIGNIFICANT CHANGE UP (ref 7–14)
BUN SERPL-MCNC: 22 MG/DL — SIGNIFICANT CHANGE UP (ref 7–23)
CALCIUM SERPL-MCNC: 9.4 MG/DL — SIGNIFICANT CHANGE UP (ref 8.4–10.5)
CHLORIDE SERPL-SCNC: 103 MMOL/L — SIGNIFICANT CHANGE UP (ref 98–107)
CO2 SERPL-SCNC: 24 MMOL/L — SIGNIFICANT CHANGE UP (ref 22–31)
CREAT SERPL-MCNC: 1.15 MG/DL — SIGNIFICANT CHANGE UP (ref 0.5–1.3)
D DIMER BLD IA.RAPID-MCNC: <150 NG/ML DDU — SIGNIFICANT CHANGE UP
EGFR: 68 ML/MIN/1.73M2 — SIGNIFICANT CHANGE UP
FERRITIN SERPL-MCNC: 72 NG/ML — SIGNIFICANT CHANGE UP (ref 30–400)
GLUCOSE BLDC GLUCOMTR-MCNC: 151 MG/DL — HIGH (ref 70–99)
GLUCOSE BLDC GLUCOMTR-MCNC: 174 MG/DL — HIGH (ref 70–99)
GLUCOSE BLDC GLUCOMTR-MCNC: 184 MG/DL — HIGH (ref 70–99)
GLUCOSE BLDC GLUCOMTR-MCNC: 204 MG/DL — HIGH (ref 70–99)
GLUCOSE SERPL-MCNC: 152 MG/DL — HIGH (ref 70–99)
HCT VFR BLD CALC: 40.5 % — SIGNIFICANT CHANGE UP (ref 39–50)
HGB BLD-MCNC: 13.4 G/DL — SIGNIFICANT CHANGE UP (ref 13–17)
MAGNESIUM SERPL-MCNC: 1.9 MG/DL — SIGNIFICANT CHANGE UP (ref 1.6–2.6)
MCHC RBC-ENTMCNC: 29.2 PG — SIGNIFICANT CHANGE UP (ref 27–34)
MCHC RBC-ENTMCNC: 33.1 GM/DL — SIGNIFICANT CHANGE UP (ref 32–36)
MCV RBC AUTO: 88.2 FL — SIGNIFICANT CHANGE UP (ref 80–100)
NRBC # BLD: 0 /100 WBCS — SIGNIFICANT CHANGE UP (ref 0–0)
NRBC # FLD: 0 K/UL — SIGNIFICANT CHANGE UP (ref 0–0)
PHOSPHATE SERPL-MCNC: 3.1 MG/DL — SIGNIFICANT CHANGE UP (ref 2.5–4.5)
PLATELET # BLD AUTO: 163 K/UL — SIGNIFICANT CHANGE UP (ref 150–400)
POTASSIUM SERPL-MCNC: 4 MMOL/L — SIGNIFICANT CHANGE UP (ref 3.5–5.3)
POTASSIUM SERPL-SCNC: 4 MMOL/L — SIGNIFICANT CHANGE UP (ref 3.5–5.3)
PROCALCITONIN SERPL-MCNC: 0.08 NG/ML — SIGNIFICANT CHANGE UP (ref 0.02–0.1)
RBC # BLD: 4.59 M/UL — SIGNIFICANT CHANGE UP (ref 4.2–5.8)
RBC # FLD: 11.9 % — SIGNIFICANT CHANGE UP (ref 10.3–14.5)
SODIUM SERPL-SCNC: 137 MMOL/L — SIGNIFICANT CHANGE UP (ref 135–145)
WBC # BLD: 8.08 K/UL — SIGNIFICANT CHANGE UP (ref 3.8–10.5)
WBC # FLD AUTO: 8.08 K/UL — SIGNIFICANT CHANGE UP (ref 3.8–10.5)

## 2022-08-20 PROCEDURE — 93010 ELECTROCARDIOGRAM REPORT: CPT

## 2022-08-20 RX ADMIN — CARVEDILOL PHOSPHATE 6.25 MILLIGRAM(S): 80 CAPSULE, EXTENDED RELEASE ORAL at 05:27

## 2022-08-20 RX ADMIN — Medication 81 MILLIGRAM(S): at 12:10

## 2022-08-20 RX ADMIN — CARVEDILOL PHOSPHATE 6.25 MILLIGRAM(S): 80 CAPSULE, EXTENDED RELEASE ORAL at 18:05

## 2022-08-20 RX ADMIN — Medication 1: at 08:59

## 2022-08-20 RX ADMIN — ENOXAPARIN SODIUM 40 MILLIGRAM(S): 100 INJECTION SUBCUTANEOUS at 18:05

## 2022-08-20 RX ADMIN — Medication 1: at 12:10

## 2022-08-20 RX ADMIN — TAMSULOSIN HYDROCHLORIDE 0.4 MILLIGRAM(S): 0.4 CAPSULE ORAL at 21:25

## 2022-08-20 RX ADMIN — LISINOPRIL 20 MILLIGRAM(S): 2.5 TABLET ORAL at 05:27

## 2022-08-20 RX ADMIN — ATORVASTATIN CALCIUM 40 MILLIGRAM(S): 80 TABLET, FILM COATED ORAL at 21:25

## 2022-08-20 RX ADMIN — Medication 1: at 18:04

## 2022-08-20 NOTE — CONSULT NOTE ADULT - ASSESSMENT
73 y/o M hx of DM, HTN, CVA (apx [id number]) w/o residual deficits, ? HF p/w fever + AMS x 1 day. found to be covid + Pe lethargic not following commands paratonia CTH chronic changes, unlikley bacterial meningitis    Impression TME in the setting of Covid    EEG and MRI ordered  treat underlying issues and reassess 
71 y/o M hx of DM, HTN, CVA (apx [phone]) w/o residual deficits, ? HF p/w fever + AMS x 1 day    EKG: none in chart     1. CHF  -euvolemic to dry on exam  -hold lasix      2. AMS  -admited with COVID  -MRI head pending  -t/t per primary team     3. Bradycardia  -noted to be corina on exam with HR in 40s  -obtain EKG and monitor on tele     4. HTN  -controlled  -c/w coreg and lisinopril  -continue to monitor BP    5. DVT prophylaxis  -lovenox subq

## 2022-08-20 NOTE — CONSULT NOTE ADULT - SUBJECTIVE AND OBJECTIVE BOX
Neurology consult    SID [id number]     Patient is a 72y old  Male who presents with a chief complaint of AMS (16 Aug 2022 14:27)      HPI:  73 y/o M hx of DM, HTN, CVA (apx [id number]) w/o residual deficits, ? HF p/w fever + AMS x 1 day. Pt at the time of interview unable to provide history even w/ an  (#[id number]). Per discussion w/ son, pt was noted to be refusing oral intake yesterday. Son states he only ate a little bit of his breakfast and lunch yesterday. Family also noted that he appeared more fatigued and unsteady while walking. At night, son noted patient was confused, also did not eat very much and had developed a dry cough. Son reports father has a chronic dry cough however was noted to be cough a lot more frequently, prompting family to bring pt into the E.D. ROS otherwise positive for polyuria which son states has been ongoing x 4-5 months. ROS otherwise negative. Per son, pt has not been complaining of any fevers, chills, no nausea, vomiting, chest pain, difficulty breath. Reports chronic intermittent constipation but without abdominal pain.     Son states pt lives with himself and his wife. Wife is currently also sick at home with fevers and a cough. Of note, pt recently returned from [place] in May 2022 after living there for a year.     In the E.D, vitals sig for T 100.9, otherwise hemodynamically stable. COVID positive. CTH w/ chronic microvascular changes/chronic lacunar infarcts. Pt admitted to medicine for further management.    (16 Aug 2022 14:27)      seen and examined    MEDICATIONS    acetaminophen     Tablet .. 650 milliGRAM(s) Oral every 6 hours PRN  aspirin enteric coated 81 milliGRAM(s) Oral daily  atorvastatin 40 milliGRAM(s) Oral at bedtime  carvedilol 6.25 milliGRAM(s) Oral every 12 hours  dextrose 50% Injectable 25 Gram(s) IV Push once  dextrose 50% Injectable 12.5 Gram(s) IV Push once  dextrose 50% Injectable 25 Gram(s) IV Push once  dextrose Oral Gel 15 Gram(s) Oral once  enoxaparin Injectable 40 milliGRAM(s) SubCutaneous every 24 hours  glucagon  Injectable 1 milliGRAM(s) IntraMuscular once  insulin lispro (ADMELOG) corrective regimen sliding scale   SubCutaneous three times a day before meals  insulin lispro (ADMELOG) corrective regimen sliding scale   SubCutaneous at bedtime  lisinopril 20 milliGRAM(s) Oral daily  remdesivir  IVPB   IV Intermittent   remdesivir  IVPB 100 milliGRAM(s) IV Intermittent every 24 hours  tamsulosin 0.4 milliGRAM(s) Oral at bedtime      PMH: No pertinent past medical history    Type 2 diabetes mellitus    CVA (cerebrovascular accident)    HTN (hypertension)    History of BPH         PSH: No significant past surgical history        FAMILY HISTORY:  No pertinent family history in first degree relatives        SOCIAL HISTORY:  No history of tobacco or alcohol use     Allergies    No Known Allergies    Intolerances        Height (cm): 167.6 (08-16 @ 19:56)  Weight (kg): 62.4 (08-16 @ 19:56)  BMI (kg/m2): 22.2 (08-16 @ 19:56)    Vital Signs Last 24 Hrs  T(C): 36.7 (17 Aug 2022 09:00), Max: 38.1 (16 Aug 2022 15:22)  T(F): 98 (17 Aug 2022 09:00), Max: 100.6 (16 Aug 2022 15:22)  HR: 100 (17 Aug 2022 05:28) (80 - 100)  BP: 155/74 (17 Aug 2022 05:28) (146/86 - 173/102)  BP(mean): --  RR: 18 (17 Aug 2022 05:28) (18 - 21)  SpO2: 97% (17 Aug 2022 05:28) (97% - 100%)    Parameters below as of 17 Aug 2022 05:28  Patient On (Oxygen Delivery Method): room air          On Neurological Examination:    Mental Status - eyes closed opens to noxious stimuli   Cranial Nerves - PERRL, EOMI,BTT bilaterally no gross facial asymmetry  Motor Exam -   withdraws all extremities RUE > LUE? paratonia    Sensory    Intact to light touch and pinprick bilaterally    Coord: FTN intact bilaterally     reflees brisk toes up                LABS:  CBC Full  -  ( 17 Aug 2022 04:27 )  WBC Count : 9.76 K/uL  RBC Count : 4.93 M/uL  Hemoglobin : 14.2 g/dL  Hematocrit : 44.0 %  Platelet Count - Automated : 152 K/uL  Mean Cell Volume : 89.2 fL  Mean Cell Hemoglobin : 28.8 pg  Mean Cell Hemoglobin Concentration : 32.3 gm/dL  Auto Neutrophil # : x  Auto Lymphocyte # : x  Auto Monocyte # : x  Auto Eosinophil # : x  Auto Basophil # : x  Auto Neutrophil % : x  Auto Lymphocyte % : x  Auto Monocyte % : x  Auto Eosinophil % : x  Auto Basophil % : x      08-17    135  |  99  |  19  ----------------------------<  135<H>  4.2   |  24  |  1.32<H>    Ca    9.8      17 Aug 2022 04:27  Phos  2.8     08-17  Mg     1.90     08-17    TPro  7.4  /  Alb  4.1  /  TBili  0.4  /  DBili  x   /  AST  22  /  ALT  19  /  AlkPhos  90  08-17    LIVER FUNCTIONS - ( 17 Aug 2022 04:27 )  Alb: 4.1 g/dL / Pro: 7.4 g/dL / ALK PHOS: 90 U/L / ALT: 19 U/L / AST: 22 U/L / GGT: x           Hemoglobin A1C:   Lipid Panel 08-17 @ 04:27  Total Cholesterol, Serum 103  LDL --  Triglycerides 74      PT/INR - ( 17 Aug 2022 04:27 )   PT: 14.5 sec;   INR: 1.25 ratio         PTT - ( 16 Aug 2022 05:05 )  PTT:31.3 sec      RADIOLOGY  < from: CT Head No Cont (08.16.22 @ 10:13) >  IMPRESSION: No acute intracranial hemorrhage, mass effect, or shift of   the midline structures.    Multiple chronic lacunar infarcts within the bilateral basal ganglia and   thalami.    Moderate severity chronic white matter microvascular type changes.    --- End of Report ---    < end of copied text >            
Severo Hernandez MD  Interventional Cardiology / Advance Heart Failure and Cardiac Transplant Specialist  [place] Office : [phone] [place] [address] [place] [address]  Tel:   [place] Office : [address] [facility] [place] [address]  Tel: [phone]       HISTORY OF PRESENTING ILLNESS:  73 y/o M hx of DM, HTN, CVA (apx [id number]) w/o residual deficits, ? HF p/w fever + AMS x 1 day. Per discussion w/ son, pt was noted to be refusing oral intake yesterday. Son states he only ate a little bit of his breakfast and lunch yesterday. Family also noted that he appeared more fatigued and unsteady while walking. At night, son noted patient was confused, also did not eat very much and had developed a dry cough. Son reports father has a chronic dry cough however was noted to be cough a lot more frequently, prompting family to bring pt into the E.D. ROS otherwise positive for polyuria which son states has been ongoing x 4-5 months. ROS otherwise negative. Per son, pt has not been complaining of any fevers, chills, no nausea, vomiting, chest pain, difficulty breath. Reports chronic intermittent constipation but without abdominal pain.     Admitted with COVID.   MEDICATIONS:  aspirin enteric coated 81 milliGRAM(s) Oral daily  carvedilol 6.25 milliGRAM(s) Oral every 12 hours  enoxaparin Injectable 40 milliGRAM(s) SubCutaneous every 24 hours  lisinopril 20 milliGRAM(s) Oral daily  tamsulosin 0.4 milliGRAM(s) Oral at bedtime        acetaminophen     Tablet .. 650 milliGRAM(s) Oral every 6 hours PRN      atorvastatin 40 milliGRAM(s) Oral at bedtime  dextrose 50% Injectable 25 Gram(s) IV Push once  dextrose 50% Injectable 12.5 Gram(s) IV Push once  dextrose 50% Injectable 25 Gram(s) IV Push once  dextrose Oral Gel 15 Gram(s) Oral once  glucagon  Injectable 1 milliGRAM(s) IntraMuscular once  insulin lispro (ADMELOG) corrective regimen sliding scale   SubCutaneous three times a day before meals  insulin lispro (ADMELOG) corrective regimen sliding scale   SubCutaneous at bedtime        PAST MEDICAL/SURGICAL HISTORY  PAST MEDICAL & SURGICAL HISTORY:  Type 2 diabetes mellitus      CVA (cerebrovascular accident)      HTN (hypertension)      History of BPH      No significant past surgical history          SOCIAL HISTORY: Substance Use (street drugs): ( x ) never used  (  ) other:    FAMILY HISTORY:  No pertinent family history in first degree relatives        REVIEW OF SYSTEMS:  CONSTITUTIONAL: No fever, weight loss, or fatigue  EYES: No eye pain, visual disturbances, or discharge  ENMT:  No difficulty hearing, tinnitus, vertigo; No sinus or throat pain  BREASTS: No pain, masses, or nipple discharge  GASTROINTESTINAL: No abdominal or epigastric pain. No nausea, vomiting, or hematemesis; No diarrhea or constipation. No melena or hematochezia.  GENITOURINARY: No dysuria, frequency, hematuria, or incontinence  NEUROLOGICAL: No headaches, memory loss, loss of strength, numbness, or tremors  ENDOCRINE: No heat or cold intolerance; No hair loss  MUSCULOSKELETAL: No joint pain or swelling; No muscle, back, or extremity pain  PSYCHIATRIC: No depression, anxiety, mood swings, or difficulty sleeping  HEME/LYMPH: No easy bruising, or bleeding gums       PHYSICAL EXAM:  T(C): 36.9 (08-20-22 @ 11:29), Max: 36.9 (08-20-22 @ 11:29)  HR: 92 (08-20-22 @ 11:29) (66 - 92)  BP: 136/63 (08-20-22 @ 11:29) (130/61 - 177/90)  RR: 19 (08-20-22 @ 11:29) (16 - 19)  SpO2: 100% (08-20-22 @ 11:29) (98% - 100%)  Wt(kg): --  I&O's Summary    19 Aug 2022 07:01  -  20 Aug 2022 07:00  --------------------------------------------------------  IN: 720 mL / OUT: 0 mL / NET: 720 mL          GENERAL: NAD  EYES:   PERRLA   ENMT:   Moist mucous membranes, Good dentition, No lesions  Cardiovascular: Normal S1 S2, No JVD, No murmurs, No edema  Respiratory: Lungs clear to auscultation	  Gastrointestinal:  Soft, Non-tender, + BS	  Extremities: no edema                                    13.4   8.08  )-----------( 163      ( 20 Aug 2022 06:37 )             40.5     08-20    137  |  103  |  22  ----------------------------<  152<H>  4.0   |  24  |  1.15    Ca    9.4      20 Aug 2022 06:37  Phos  3.1     08-20  Mg     1.90     08-20      proBNP:   Lipid Profile:   HgA1c:   TSH:     Consultant(s) Notes Reviewed:  [x ] YES  [ ] NO    Care Discussed with Consultants/Other Providers [ x] YES  [ ] NO    Imaging Personally Reviewed independently:  [x] YES  [ ] NO    All labs, radiologic studies, vitals, orders and medications list reviewed. Patient is seen and examined at bedside. Case discussed with medical team.

## 2022-08-21 LAB
ANION GAP SERPL CALC-SCNC: 8 MMOL/L — SIGNIFICANT CHANGE UP (ref 7–14)
BUN SERPL-MCNC: 20 MG/DL — SIGNIFICANT CHANGE UP (ref 7–23)
CALCIUM SERPL-MCNC: 9.1 MG/DL — SIGNIFICANT CHANGE UP (ref 8.4–10.5)
CHLORIDE SERPL-SCNC: 105 MMOL/L — SIGNIFICANT CHANGE UP (ref 98–107)
CO2 SERPL-SCNC: 22 MMOL/L — SIGNIFICANT CHANGE UP (ref 22–31)
CREAT SERPL-MCNC: 1.11 MG/DL — SIGNIFICANT CHANGE UP (ref 0.5–1.3)
CULTURE RESULTS: SIGNIFICANT CHANGE UP
CULTURE RESULTS: SIGNIFICANT CHANGE UP
EGFR: 71 ML/MIN/1.73M2 — SIGNIFICANT CHANGE UP
GLUCOSE BLDC GLUCOMTR-MCNC: 149 MG/DL — HIGH (ref 70–99)
GLUCOSE BLDC GLUCOMTR-MCNC: 150 MG/DL — HIGH (ref 70–99)
GLUCOSE BLDC GLUCOMTR-MCNC: 151 MG/DL — HIGH (ref 70–99)
GLUCOSE BLDC GLUCOMTR-MCNC: 191 MG/DL — HIGH (ref 70–99)
GLUCOSE SERPL-MCNC: 180 MG/DL — HIGH (ref 70–99)
HCT VFR BLD CALC: 41.2 % — SIGNIFICANT CHANGE UP (ref 39–50)
HGB BLD-MCNC: 13.5 G/DL — SIGNIFICANT CHANGE UP (ref 13–17)
MAGNESIUM SERPL-MCNC: 2.1 MG/DL — SIGNIFICANT CHANGE UP (ref 1.6–2.6)
MCHC RBC-ENTMCNC: 29 PG — SIGNIFICANT CHANGE UP (ref 27–34)
MCHC RBC-ENTMCNC: 32.8 GM/DL — SIGNIFICANT CHANGE UP (ref 32–36)
MCV RBC AUTO: 88.6 FL — SIGNIFICANT CHANGE UP (ref 80–100)
NRBC # BLD: 0 /100 WBCS — SIGNIFICANT CHANGE UP (ref 0–0)
NRBC # FLD: 0 K/UL — SIGNIFICANT CHANGE UP (ref 0–0)
PHOSPHATE SERPL-MCNC: 3 MG/DL — SIGNIFICANT CHANGE UP (ref 2.5–4.5)
PLATELET # BLD AUTO: 159 K/UL — SIGNIFICANT CHANGE UP (ref 150–400)
POTASSIUM SERPL-MCNC: 4.3 MMOL/L — SIGNIFICANT CHANGE UP (ref 3.5–5.3)
POTASSIUM SERPL-SCNC: 4.3 MMOL/L — SIGNIFICANT CHANGE UP (ref 3.5–5.3)
RBC # BLD: 4.65 M/UL — SIGNIFICANT CHANGE UP (ref 4.2–5.8)
RBC # FLD: 11.9 % — SIGNIFICANT CHANGE UP (ref 10.3–14.5)
SODIUM SERPL-SCNC: 135 MMOL/L — SIGNIFICANT CHANGE UP (ref 135–145)
SPECIMEN SOURCE: SIGNIFICANT CHANGE UP
SPECIMEN SOURCE: SIGNIFICANT CHANGE UP
WBC # BLD: 8.09 K/UL — SIGNIFICANT CHANGE UP (ref 3.8–10.5)
WBC # FLD AUTO: 8.09 K/UL — SIGNIFICANT CHANGE UP (ref 3.8–10.5)

## 2022-08-21 RX ORDER — CARVEDILOL PHOSPHATE 80 MG/1
12.5 CAPSULE, EXTENDED RELEASE ORAL EVERY 12 HOURS
Refills: 0 | Status: DISCONTINUED | OUTPATIENT
Start: 2022-08-21 | End: 2022-08-21

## 2022-08-21 RX ORDER — CARVEDILOL PHOSPHATE 80 MG/1
12.5 CAPSULE, EXTENDED RELEASE ORAL EVERY 12 HOURS
Refills: 0 | Status: DISCONTINUED | OUTPATIENT
Start: 2022-08-21 | End: 2022-08-22

## 2022-08-21 RX ADMIN — ATORVASTATIN CALCIUM 40 MILLIGRAM(S): 80 TABLET, FILM COATED ORAL at 21:19

## 2022-08-21 RX ADMIN — ENOXAPARIN SODIUM 40 MILLIGRAM(S): 100 INJECTION SUBCUTANEOUS at 17:08

## 2022-08-21 RX ADMIN — TAMSULOSIN HYDROCHLORIDE 0.4 MILLIGRAM(S): 0.4 CAPSULE ORAL at 21:19

## 2022-08-21 RX ADMIN — Medication 1: at 12:45

## 2022-08-21 RX ADMIN — LISINOPRIL 20 MILLIGRAM(S): 2.5 TABLET ORAL at 04:43

## 2022-08-21 RX ADMIN — CARVEDILOL PHOSPHATE 12.5 MILLIGRAM(S): 80 CAPSULE, EXTENDED RELEASE ORAL at 17:08

## 2022-08-21 RX ADMIN — Medication 1: at 17:43

## 2022-08-21 RX ADMIN — CARVEDILOL PHOSPHATE 6.25 MILLIGRAM(S): 80 CAPSULE, EXTENDED RELEASE ORAL at 04:43

## 2022-08-21 RX ADMIN — Medication 81 MILLIGRAM(S): at 17:08

## 2022-08-22 LAB
GLUCOSE BLDC GLUCOMTR-MCNC: 129 MG/DL — HIGH (ref 70–99)
GLUCOSE BLDC GLUCOMTR-MCNC: 159 MG/DL — HIGH (ref 70–99)
GLUCOSE BLDC GLUCOMTR-MCNC: 180 MG/DL — HIGH (ref 70–99)
GLUCOSE BLDC GLUCOMTR-MCNC: 202 MG/DL — HIGH (ref 70–99)

## 2022-08-22 PROCEDURE — 70551 MRI BRAIN STEM W/O DYE: CPT | Mod: 26

## 2022-08-22 RX ORDER — CARVEDILOL PHOSPHATE 80 MG/1
25 CAPSULE, EXTENDED RELEASE ORAL EVERY 12 HOURS
Refills: 0 | Status: DISCONTINUED | OUTPATIENT
Start: 2022-08-22 | End: 2022-08-27

## 2022-08-22 RX ADMIN — LISINOPRIL 20 MILLIGRAM(S): 2.5 TABLET ORAL at 04:44

## 2022-08-22 RX ADMIN — Medication 81 MILLIGRAM(S): at 12:50

## 2022-08-22 RX ADMIN — Medication 2: at 12:49

## 2022-08-22 RX ADMIN — CARVEDILOL PHOSPHATE 12.5 MILLIGRAM(S): 80 CAPSULE, EXTENDED RELEASE ORAL at 04:44

## 2022-08-22 RX ADMIN — Medication 1: at 08:47

## 2022-08-22 RX ADMIN — CARVEDILOL PHOSPHATE 25 MILLIGRAM(S): 80 CAPSULE, EXTENDED RELEASE ORAL at 18:20

## 2022-08-22 RX ADMIN — ENOXAPARIN SODIUM 40 MILLIGRAM(S): 100 INJECTION SUBCUTANEOUS at 16:03

## 2022-08-22 RX ADMIN — TAMSULOSIN HYDROCHLORIDE 0.4 MILLIGRAM(S): 0.4 CAPSULE ORAL at 21:04

## 2022-08-22 RX ADMIN — Medication 0.5 MILLIGRAM(S): at 13:16

## 2022-08-22 RX ADMIN — ATORVASTATIN CALCIUM 40 MILLIGRAM(S): 80 TABLET, FILM COATED ORAL at 21:05

## 2022-08-23 LAB
ANION GAP SERPL CALC-SCNC: 9 MMOL/L — SIGNIFICANT CHANGE UP (ref 7–14)
BUN SERPL-MCNC: 17 MG/DL — SIGNIFICANT CHANGE UP (ref 7–23)
CALCIUM SERPL-MCNC: 9.6 MG/DL — SIGNIFICANT CHANGE UP (ref 8.4–10.5)
CHLORIDE SERPL-SCNC: 105 MMOL/L — SIGNIFICANT CHANGE UP (ref 98–107)
CO2 SERPL-SCNC: 23 MMOL/L — SIGNIFICANT CHANGE UP (ref 22–31)
CREAT SERPL-MCNC: 1.15 MG/DL — SIGNIFICANT CHANGE UP (ref 0.5–1.3)
EGFR: 68 ML/MIN/1.73M2 — SIGNIFICANT CHANGE UP
GLUCOSE BLDC GLUCOMTR-MCNC: 128 MG/DL — HIGH (ref 70–99)
GLUCOSE BLDC GLUCOMTR-MCNC: 164 MG/DL — HIGH (ref 70–99)
GLUCOSE BLDC GLUCOMTR-MCNC: 182 MG/DL — HIGH (ref 70–99)
GLUCOSE BLDC GLUCOMTR-MCNC: 203 MG/DL — HIGH (ref 70–99)
GLUCOSE SERPL-MCNC: 201 MG/DL — HIGH (ref 70–99)
HCT VFR BLD CALC: 41.2 % — SIGNIFICANT CHANGE UP (ref 39–50)
HGB BLD-MCNC: 13.4 G/DL — SIGNIFICANT CHANGE UP (ref 13–17)
MAGNESIUM SERPL-MCNC: 2.1 MG/DL — SIGNIFICANT CHANGE UP (ref 1.6–2.6)
MCHC RBC-ENTMCNC: 29.6 PG — SIGNIFICANT CHANGE UP (ref 27–34)
MCHC RBC-ENTMCNC: 32.5 GM/DL — SIGNIFICANT CHANGE UP (ref 32–36)
MCV RBC AUTO: 90.9 FL — SIGNIFICANT CHANGE UP (ref 80–100)
NRBC # BLD: 0 /100 WBCS — SIGNIFICANT CHANGE UP (ref 0–0)
NRBC # FLD: 0 K/UL — SIGNIFICANT CHANGE UP (ref 0–0)
PHOSPHATE SERPL-MCNC: 3 MG/DL — SIGNIFICANT CHANGE UP (ref 2.5–4.5)
PLATELET # BLD AUTO: 189 K/UL — SIGNIFICANT CHANGE UP (ref 150–400)
POTASSIUM SERPL-MCNC: 4.6 MMOL/L — SIGNIFICANT CHANGE UP (ref 3.5–5.3)
POTASSIUM SERPL-SCNC: 4.6 MMOL/L — SIGNIFICANT CHANGE UP (ref 3.5–5.3)
RBC # BLD: 4.53 M/UL — SIGNIFICANT CHANGE UP (ref 4.2–5.8)
RBC # FLD: 11.6 % — SIGNIFICANT CHANGE UP (ref 10.3–14.5)
SODIUM SERPL-SCNC: 137 MMOL/L — SIGNIFICANT CHANGE UP (ref 135–145)
WBC # BLD: 11.24 K/UL — HIGH (ref 3.8–10.5)
WBC # FLD AUTO: 11.24 K/UL — HIGH (ref 3.8–10.5)

## 2022-08-23 PROCEDURE — 93306 TTE W/DOPPLER COMPLETE: CPT | Mod: 26

## 2022-08-23 RX ADMIN — TAMSULOSIN HYDROCHLORIDE 0.4 MILLIGRAM(S): 0.4 CAPSULE ORAL at 21:14

## 2022-08-23 RX ADMIN — ENOXAPARIN SODIUM 40 MILLIGRAM(S): 100 INJECTION SUBCUTANEOUS at 15:09

## 2022-08-23 RX ADMIN — ATORVASTATIN CALCIUM 40 MILLIGRAM(S): 80 TABLET, FILM COATED ORAL at 21:12

## 2022-08-23 RX ADMIN — LISINOPRIL 20 MILLIGRAM(S): 2.5 TABLET ORAL at 05:09

## 2022-08-23 RX ADMIN — CARVEDILOL PHOSPHATE 25 MILLIGRAM(S): 80 CAPSULE, EXTENDED RELEASE ORAL at 17:57

## 2022-08-23 RX ADMIN — CARVEDILOL PHOSPHATE 25 MILLIGRAM(S): 80 CAPSULE, EXTENDED RELEASE ORAL at 05:09

## 2022-08-23 RX ADMIN — Medication 2: at 17:46

## 2022-08-23 RX ADMIN — Medication 81 MILLIGRAM(S): at 12:21

## 2022-08-23 RX ADMIN — Medication 1: at 09:06

## 2022-08-23 RX ADMIN — Medication 1: at 12:21

## 2022-08-24 ENCOUNTER — TRANSCRIPTION ENCOUNTER (OUTPATIENT)
Age: 72
End: 2022-08-24

## 2022-08-24 LAB
ANION GAP SERPL CALC-SCNC: 10 MMOL/L — SIGNIFICANT CHANGE UP (ref 7–14)
BASOPHILS # BLD AUTO: 0.05 K/UL — SIGNIFICANT CHANGE UP (ref 0–0.2)
BASOPHILS NFR BLD AUTO: 0.5 % — SIGNIFICANT CHANGE UP (ref 0–2)
BUN SERPL-MCNC: 14 MG/DL — SIGNIFICANT CHANGE UP (ref 7–23)
CALCIUM SERPL-MCNC: 9.6 MG/DL — SIGNIFICANT CHANGE UP (ref 8.4–10.5)
CHLORIDE SERPL-SCNC: 104 MMOL/L — SIGNIFICANT CHANGE UP (ref 98–107)
CO2 SERPL-SCNC: 24 MMOL/L — SIGNIFICANT CHANGE UP (ref 22–31)
CREAT SERPL-MCNC: 1.04 MG/DL — SIGNIFICANT CHANGE UP (ref 0.5–1.3)
EGFR: 76 ML/MIN/1.73M2 — SIGNIFICANT CHANGE UP
EOSINOPHIL # BLD AUTO: 0.22 K/UL — SIGNIFICANT CHANGE UP (ref 0–0.5)
EOSINOPHIL NFR BLD AUTO: 2.2 % — SIGNIFICANT CHANGE UP (ref 0–6)
GLUCOSE BLDC GLUCOMTR-MCNC: 129 MG/DL — HIGH (ref 70–99)
GLUCOSE BLDC GLUCOMTR-MCNC: 131 MG/DL — HIGH (ref 70–99)
GLUCOSE BLDC GLUCOMTR-MCNC: 165 MG/DL — HIGH (ref 70–99)
GLUCOSE BLDC GLUCOMTR-MCNC: 229 MG/DL — HIGH (ref 70–99)
GLUCOSE SERPL-MCNC: 132 MG/DL — HIGH (ref 70–99)
HCT VFR BLD CALC: 41.2 % — SIGNIFICANT CHANGE UP (ref 39–50)
HGB BLD-MCNC: 13.5 G/DL — SIGNIFICANT CHANGE UP (ref 13–17)
IANC: 6.31 K/UL — SIGNIFICANT CHANGE UP (ref 1.8–7.4)
IMM GRANULOCYTES NFR BLD AUTO: 0.4 % — SIGNIFICANT CHANGE UP (ref 0–1.5)
LYMPHOCYTES # BLD AUTO: 2.45 K/UL — SIGNIFICANT CHANGE UP (ref 1–3.3)
LYMPHOCYTES # BLD AUTO: 24.4 % — SIGNIFICANT CHANGE UP (ref 13–44)
MAGNESIUM SERPL-MCNC: 2.1 MG/DL — SIGNIFICANT CHANGE UP (ref 1.6–2.6)
MCHC RBC-ENTMCNC: 29.3 PG — SIGNIFICANT CHANGE UP (ref 27–34)
MCHC RBC-ENTMCNC: 32.8 GM/DL — SIGNIFICANT CHANGE UP (ref 32–36)
MCV RBC AUTO: 89.4 FL — SIGNIFICANT CHANGE UP (ref 80–100)
MONOCYTES # BLD AUTO: 0.99 K/UL — HIGH (ref 0–0.9)
MONOCYTES NFR BLD AUTO: 9.8 % — SIGNIFICANT CHANGE UP (ref 2–14)
NEUTROPHILS # BLD AUTO: 6.31 K/UL — SIGNIFICANT CHANGE UP (ref 1.8–7.4)
NEUTROPHILS NFR BLD AUTO: 62.7 % — SIGNIFICANT CHANGE UP (ref 43–77)
NRBC # BLD: 0 /100 WBCS — SIGNIFICANT CHANGE UP (ref 0–0)
NRBC # FLD: 0 K/UL — SIGNIFICANT CHANGE UP (ref 0–0)
PHOSPHATE SERPL-MCNC: 3.2 MG/DL — SIGNIFICANT CHANGE UP (ref 2.5–4.5)
PLATELET # BLD AUTO: 194 K/UL — SIGNIFICANT CHANGE UP (ref 150–400)
POTASSIUM SERPL-MCNC: 4.3 MMOL/L — SIGNIFICANT CHANGE UP (ref 3.5–5.3)
POTASSIUM SERPL-SCNC: 4.3 MMOL/L — SIGNIFICANT CHANGE UP (ref 3.5–5.3)
RBC # BLD: 4.61 M/UL — SIGNIFICANT CHANGE UP (ref 4.2–5.8)
RBC # FLD: 11.6 % — SIGNIFICANT CHANGE UP (ref 10.3–14.5)
SODIUM SERPL-SCNC: 138 MMOL/L — SIGNIFICANT CHANGE UP (ref 135–145)
WBC # BLD: 10.06 K/UL — SIGNIFICANT CHANGE UP (ref 3.8–10.5)
WBC # FLD AUTO: 10.06 K/UL — SIGNIFICANT CHANGE UP (ref 3.8–10.5)

## 2022-08-24 RX ADMIN — Medication 1: at 18:08

## 2022-08-24 RX ADMIN — ATORVASTATIN CALCIUM 40 MILLIGRAM(S): 80 TABLET, FILM COATED ORAL at 21:15

## 2022-08-24 RX ADMIN — LISINOPRIL 20 MILLIGRAM(S): 2.5 TABLET ORAL at 05:07

## 2022-08-24 RX ADMIN — Medication 2: at 13:05

## 2022-08-24 RX ADMIN — Medication 81 MILLIGRAM(S): at 13:05

## 2022-08-24 RX ADMIN — CARVEDILOL PHOSPHATE 25 MILLIGRAM(S): 80 CAPSULE, EXTENDED RELEASE ORAL at 05:07

## 2022-08-24 RX ADMIN — ENOXAPARIN SODIUM 40 MILLIGRAM(S): 100 INJECTION SUBCUTANEOUS at 15:18

## 2022-08-24 RX ADMIN — TAMSULOSIN HYDROCHLORIDE 0.4 MILLIGRAM(S): 0.4 CAPSULE ORAL at 21:15

## 2022-08-24 RX ADMIN — CARVEDILOL PHOSPHATE 25 MILLIGRAM(S): 80 CAPSULE, EXTENDED RELEASE ORAL at 18:08

## 2022-08-24 NOTE — DIETITIAN INITIAL EVALUATION ADULT - OTHER INFO
72 year old male with a PMH of DM, HTN, CVA w/o residual deficits p/w AMS, cough and decreased PO intake, likely 2/2 metabolic encephalopathy iso COVID infection per chart.    Per PCA, patient finished all of breakfast this morning. Noted with variable PO intake % per RN flow sheet. No GI distress or intolerances to diet consistency reported. Has no food allergies per chart. Unable to obtain UBW at this time. No recent weights noted per HIE. ABW is 62.4 kg (8/16) per chart, will monitor. No edema or pressure injuries noted per RN flow sheet.

## 2022-08-24 NOTE — DIETITIAN INITIAL EVALUATION ADULT - PERTINENT LABORATORY DATA
08-24    138  |  104  |  14  ----------------------------<  132<H>  4.3   |  24  |  1.04    Ca    9.6      24 Aug 2022 06:40  Phos  3.2     08-24  Mg     2.10     08-24    POCT Blood Glucose.: 229 mg/dL (08-24-22 @ 12:28)

## 2022-08-24 NOTE — DISCHARGE NOTE PROVIDER - NSDCMRMEDTOKEN_GEN_ALL_CORE_FT
carvedilol 6.25 mg oral tablet: 1 tab(s) orally 2 times a day  furosemide 40 mg oral tablet: 1 tab(s) orally 3 times a week, As Needed  lisinopril 20 mg oral tablet: 1 tab(s) orally once a day  metFORMIN 500 mg oral tablet, extended release: 1 tab(s) orally 2 times a day  tamsulosin 0.4 mg oral capsule: 1 cap(s) orally once a day   aspirin 81 mg oral delayed release tablet: 1 tab(s) orally once a day  atorvastatin 40 mg oral tablet: 1 tab(s) orally once a day (at bedtime)  carvedilol 6.25 mg oral tablet: 1 tab(s) orally 2 times a day  furosemide 40 mg oral tablet: 1 tab(s) orally 3 times a week, As Needed  lisinopril 20 mg oral tablet: 1 tab(s) orally once a day  metFORMIN 500 mg oral tablet, extended release: 1 tab(s) orally 2 times a day  tamsulosin 0.4 mg oral capsule: 1 cap(s) orally once a day

## 2022-08-24 NOTE — DIETITIAN INITIAL EVALUATION ADULT - PERTINENT MEDS FT
MEDICATIONS  (STANDING):  aspirin enteric coated 81 milliGRAM(s) Oral daily  atorvastatin 40 milliGRAM(s) Oral at bedtime  carvedilol 25 milliGRAM(s) Oral every 12 hours  dextrose 50% Injectable 25 Gram(s) IV Push once  dextrose 50% Injectable 12.5 Gram(s) IV Push once  dextrose 50% Injectable 25 Gram(s) IV Push once  dextrose Oral Gel 15 Gram(s) Oral once  enoxaparin Injectable 40 milliGRAM(s) SubCutaneous every 24 hours  glucagon  Injectable 1 milliGRAM(s) IntraMuscular once  insulin lispro (ADMELOG) corrective regimen sliding scale   SubCutaneous three times a day before meals  insulin lispro (ADMELOG) corrective regimen sliding scale   SubCutaneous at bedtime  lisinopril 20 milliGRAM(s) Oral daily  tamsulosin 0.4 milliGRAM(s) Oral at bedtime    MEDICATIONS  (PRN):  acetaminophen     Tablet .. 650 milliGRAM(s) Oral every 6 hours PRN Temp greater or equal to 38C (100.4F), Mild Pain (1 - 3), Moderate Pain (4 - 6)

## 2022-08-24 NOTE — DIETITIAN INITIAL EVALUATION ADULT - ADD RECOMMEND
Consider formal swallow evaluation to determine appropriate diet consistency. Document PO intake to monitor trend.

## 2022-08-24 NOTE — DIETITIAN INITIAL EVALUATION ADULT - ORAL INTAKE PTA/DIET HISTORY
Patient seen for assessment. Unable to obtain information from patient 2/2 cognitive status. Information obtained from PCA and chart review. Per H&P, patient noted with decreased PO intake x 1 day PTA.

## 2022-08-24 NOTE — DISCHARGE NOTE PROVIDER - NSDCCPCAREPLAN_GEN_ALL_CORE_FT
PRINCIPAL DISCHARGE DIAGNOSIS  Diagnosis: Metabolic encephalopathy  Assessment and Plan of Treatment: You came into the hospital with altered mental status, this was likely due to Covid-19, you were seen by a neurologist and had an MRI of your brain performed which did not show anything acute.  You are to follow up with the neurologist within 1-2 weeks after D/C      SECONDARY DISCHARGE DIAGNOSES  Diagnosis: Cerebrovascular accident (CVA)  Assessment and Plan of Treatment: You had a previous stoke prior to coming to the hospital, we did a cat scan and MRI of your brain which did not show anything.  You are to continue your ASPIRIN and follow up with your primary care doctor within 1-2 weeks after D/C    Diagnosis: DM2 (diabetes mellitus, type 2)  Assessment and Plan of Treatment: Please continue your home medications    Diagnosis: HTN (hypertension)  Assessment and Plan of Treatment: Continue blood pressure medication regimen as directed. Monitor for any visual changes, headaches or dizziness.  Monitor blood pressure regularly.  Follow up with your primary care provider for further management for high blood pressure.    Diagnosis: 2019 novel coronavirus disease (COVID-19)  Assessment and Plan of Treatment: You have Covid-19, you received Remdesivir [place] the [facility].  Your breathing is normal and you do not require any supplemental oxygen.

## 2022-08-24 NOTE — DISCHARGE NOTE PROVIDER - HOSPITAL COURSE
71 y/o M hx of DM, HTN, CVA (apx [id number]) w/o residual deficits, ? HF p/w AMS, cough and decreased PO intake, likely 2/2 metabolic encephalopathy iso COVID infection    Metabolic encephalopathy.   - Suspect presentation likely metabolic encephalopathy in setting of COVID infection. -improving Mental status  - s/p remdesivir   - Monitor hepatic panel on tx  - No indication for decadron at this time as pt currently saturating well on RA  - trend inflammatory markers: LDH, Ferritin, CRP , D-dimer    -F/u blood Cx x 2  -Trend fever curve  -neuro/ ID f/u noted.    2019 novel coronavirus disease (COVID-19).   - Management as above.    Cerebrovascular accident (CVA).   - CT head sig for chronic microvascular changes/chronic lacunar infarcts. Son reports pt had a "stroke" 1-2 years ago. On ASA at home (ascard)  - c/w home aspirin  - atorvastatin 40mg QD.    DM2 (diabetes mellitus, type 2).   - Hold home metformin  - Resume ISS  - monitor FS.    HTN (hypertension).   - c/w home lisinopril and coreg w/ hold parameters.    Heart failure.   - Euvolemic  - Cardiology f/u noted.    BPH (benign prostatic hyperplasia).   - C/w home tamsulosin.    Prophylactic measure.   - DIET: Soft diet, advance as tolerated    Patient seen and evaluated. Reviewed discharge medications with patient and attending. All new medications requiring new prescriptions were sent to the pharmacy of patient's choice. Reviewed need for prescription for previous home medications and new prescriptions sent if requested. Medically cleared/stable for discharge as per Dr. Kaur on -----  with appropriate follow up. Patient understands and agrees with plan of care.      73 y/o M hx of DM, HTN, CVA (apx [id number]) w/o residual deficits, ? HF p/w AMS, cough and decreased PO intake, likely 2/2 metabolic encephalopathy iso COVID infection    Metabolic encephalopathy.   - Suspect presentation likely metabolic encephalopathy in setting of COVID infection. -improving Mental status  - s/p remdesivir   - Monitor hepatic panel on tx  - No indication for decadron at this time as pt currently saturating well on RA  - trend inflammatory markers: LDH, Ferritin, CRP , D-dimer    -blood Cx x 2: No growth  -Trend fever curve  -neuro/ ID f/u noted.    2019 novel coronavirus disease (COVID-19).   - Management as above.    Cerebrovascular accident (CVA).   - CT head sig for chronic microvascular changes/chronic lacunar infarcts. Son reports pt had a "stroke" 1-2 years ago. On ASA at home (ascard)  - c/w home aspirin  - atorvastatin 40mg QD.    DM2 (diabetes mellitus, type 2).   - Hold home metformin  - Resume ISS  - monitor FS.    HTN (hypertension).   - c/w home lisinopril and coreg w/ hold parameters.    Heart failure.   - Euvolemic  - Cardiology f/u noted.    BPH (benign prostatic hyperplasia).   - C/w home tamsulosin.    Prophylactic measure.   - DIET: Soft diet, advance as tolerated    Patient seen and evaluated. Reviewed discharge medications with patient and attending. All new medications requiring new prescriptions were sent to the pharmacy of patient's choice. Reviewed need for prescription for previous /home medications and new prescriptions sent if requested. Medically cleared/stable for discharge as per Dr. Kaur on 8/27/88 with appropriate follow up. Patient understands and agrees with plan of care.

## 2022-08-24 NOTE — DISCHARGE NOTE PROVIDER - CARE PROVIDER_API CALL
Varun Bernstein)  Neurology; Neurophysiology  [address], [address]  [place] [address]  Phone: [phone]  Fax: [phone]  Follow Up Time:

## 2022-08-24 NOTE — DISCHARGE NOTE PROVIDER - NSDCFUADDAPPT_GEN_ALL_CORE_FT
Please follow up with Neurology, Dr. Varun Bernstein at [phone]- Please follow up with Neurology, Dr. Varun Bernstein at [phone]    Please follow up with PCP

## 2022-08-25 LAB
GLUCOSE BLDC GLUCOMTR-MCNC: 129 MG/DL — HIGH (ref 70–99)
GLUCOSE BLDC GLUCOMTR-MCNC: 232 MG/DL — HIGH (ref 70–99)

## 2022-08-25 RX ADMIN — TAMSULOSIN HYDROCHLORIDE 0.4 MILLIGRAM(S): 0.4 CAPSULE ORAL at 21:20

## 2022-08-25 RX ADMIN — Medication 81 MILLIGRAM(S): at 12:29

## 2022-08-25 RX ADMIN — ATORVASTATIN CALCIUM 40 MILLIGRAM(S): 80 TABLET, FILM COATED ORAL at 21:20

## 2022-08-25 RX ADMIN — CARVEDILOL PHOSPHATE 25 MILLIGRAM(S): 80 CAPSULE, EXTENDED RELEASE ORAL at 05:52

## 2022-08-25 RX ADMIN — ENOXAPARIN SODIUM 40 MILLIGRAM(S): 100 INJECTION SUBCUTANEOUS at 16:05

## 2022-08-25 RX ADMIN — LISINOPRIL 20 MILLIGRAM(S): 2.5 TABLET ORAL at 05:52

## 2022-08-25 RX ADMIN — CARVEDILOL PHOSPHATE 25 MILLIGRAM(S): 80 CAPSULE, EXTENDED RELEASE ORAL at 18:41

## 2022-08-25 NOTE — SWALLOW BEDSIDE ASSESSMENT ADULT - SWALLOW EVAL: ORAL MUSCULATURE
inconsistent ability following 1-step commands/generally intact/unable to assess due to poor participation/comprehension

## 2022-08-25 NOTE — SWALLOW BEDSIDE ASSESSMENT ADULT - SWALLOW EVAL: DIAGNOSIS
Patient presents with oropharyngeal dysphagia given thin liquids, puree, minced & moist and soft & bite sized marked by adequate bolus containment, slowed bolus manipulation, slowed mastication with reduced ability to break down soft & bite sized solids resulting in majority of unchewed bolus remaining diffuse in oral cavity requiring clinician cue for patient to continue mastication and slowed anterior-posterior transport. mild oral residue noted in anterior and lateral sulci for soft & bite sized solids requiring multiple thin liquid washes to clear. Pharyngeal stage marked by observed initiation of the pharyngeal swallow trigger judged via laryngeal palpation. No overt s/sx of impaired airway protection observed for all trials

## 2022-08-25 NOTE — SWALLOW BEDSIDE ASSESSMENT ADULT - COMMENTS
Per Internal medicine Note 8/24/22: "73 y/o M hx of DM, HTN, CVA (apx [id number]) w/o residual deficits, ? HF p/w AMS, cough and decreased PO intake, likely 2/2 metabolic encephalopathy iso COVID infection"     Patient received upright in bed, awake and alert with 1:4 PCA and RN present. Patient with intermittent ability to follow 1-step commands.

## 2022-08-25 NOTE — SWALLOW BEDSIDE ASSESSMENT ADULT - ASR SWALLOW RECOMMEND DIAG
objective testing is NOT indicated given no overt s/sx of impaired airway protection and clear chest imaging

## 2022-08-26 LAB
GLUCOSE BLDC GLUCOMTR-MCNC: 124 MG/DL — HIGH (ref 70–99)
GLUCOSE BLDC GLUCOMTR-MCNC: 135 MG/DL — HIGH (ref 70–99)
GLUCOSE BLDC GLUCOMTR-MCNC: 169 MG/DL — HIGH (ref 70–99)
GLUCOSE BLDC GLUCOMTR-MCNC: 297 MG/DL — HIGH (ref 70–99)
SARS-COV-2 RNA SPEC QL NAA+PROBE: DETECTED

## 2022-08-26 RX ADMIN — ENOXAPARIN SODIUM 40 MILLIGRAM(S): 100 INJECTION SUBCUTANEOUS at 16:41

## 2022-08-26 RX ADMIN — CARVEDILOL PHOSPHATE 25 MILLIGRAM(S): 80 CAPSULE, EXTENDED RELEASE ORAL at 05:06

## 2022-08-26 RX ADMIN — CARVEDILOL PHOSPHATE 25 MILLIGRAM(S): 80 CAPSULE, EXTENDED RELEASE ORAL at 16:41

## 2022-08-26 RX ADMIN — Medication 81 MILLIGRAM(S): at 16:41

## 2022-08-26 RX ADMIN — TAMSULOSIN HYDROCHLORIDE 0.4 MILLIGRAM(S): 0.4 CAPSULE ORAL at 22:30

## 2022-08-26 RX ADMIN — LISINOPRIL 20 MILLIGRAM(S): 2.5 TABLET ORAL at 05:06

## 2022-08-26 RX ADMIN — Medication 3: at 13:30

## 2022-08-26 RX ADMIN — ATORVASTATIN CALCIUM 40 MILLIGRAM(S): 80 TABLET, FILM COATED ORAL at 22:30

## 2022-08-26 NOTE — PROGRESS NOTE ADULT - PROBLEM SELECTOR PLAN 1
Suspect presentation likely metabolic encephalopathy in setting of COVID infection. -improving Mental status  s/p remdesivir   -Monitor hepatic panel on tx  -No indication for decadron at this time as pt currently saturating well on RA  -trend inflammatory markers: LDH, Ferritin, CRP , D-dimer  -  -F/u blood Cx x 2  -Trend fever curve  -neuro/ ID f/u noted
Suspect presentation likely metabolic encephalopathy in setting of COVID infection. R/O UTI given polyuria  on  remdesivir 3-5 days pending clinical response  -Monitor hepatic panel on tx  -No indication for decadron at this time as pt currently saturating well on RA  -trend inflammatory markers: LDH, Ferritin, CRP , D-dimer  -  -F/u blood Cx x 2  -Trend fever curve  -neuro/ ID f/u
Suspect presentation likely metabolic encephalopathy in setting of COVID infection. -improving Mental status  s/p remdesivir   -Monitor hepatic panel on tx  -No indication for decadron at this time as pt currently saturating well on RA  -trend inflammatory markers: LDH, Ferritin, CRP , D-dimer  -  -F/u blood Cx x 2  -Trend fever curve  -neuro/ ID f/u noted
Suspect presentation likely metabolic encephalopathy in setting of COVID infection. R/O UTI given polyuria  on  remdesivir 3-5 days pending clinical response  -Monitor hepatic panel on tx  -No indication for decadron at this time as pt currently saturating well on RA  -trend inflammatory markers: LDH, Ferritin, CRP , D-dimer  -  -F/u blood Cx x 2  -Trend fever curve  -neuro/ ID f/u
Suspect presentation likely metabolic encephalopathy in setting of COVID infection. -improving Mental status  s/p remdesivir   -Monitor hepatic panel on tx  -No indication for decadron at this time as pt currently saturating well on RA  -trend inflammatory markers: LDH, Ferritin, CRP , D-dimer  -  -F/u blood Cx x 2  -Trend fever curve  -neuro/ ID f/u noted
Suspect presentation likely metabolic encephalopathy in setting of COVID infection. R/O UTI given polyuria  on  remdesivir 3-5 days pending clinical response  -Monitor hepatic panel on tx  -No indication for decadron at this time as pt currently saturating well on RA  -trend inflammatory markers: LDH, Ferritin, CRP , D-dimer  -  -F/u blood Cx x 2  -Trend fever curve  -neuro/ ID f/u
Suspect presentation likely metabolic encephalopathy in setting of COVID infection. R/O UTI given polyuria  s/p remdesivir   -Monitor hepatic panel on tx  -No indication for decadron at this time as pt currently saturating well on RA  -trend inflammatory markers: LDH, Ferritin, CRP , D-dimer  -  -F/u blood Cx x 2  -Trend fever curve  -neuro/ ID f/u
Suspect presentation likely metabolic encephalopathy in setting of COVID infection. R/O UTI given polyuria  -start remdesivir 3-5 days pending clinical response  -Monitor hepatic panel on tx  -No indication for decadron at this time as pt currently saturating well on RA  -Obtain | trend inflammatory markers: LDH, Ferritin, CRP , D-dimer  -Check UA/urine culture  -F/u blood Cx x 2  -Trend fever curve  -May benefit from LP/meningitis/encephalitis work up if not improving w/ tx, though less suspicious at this time
Suspect presentation likely metabolic encephalopathy in setting of COVID infection. -improving Mental status  s/p remdesivir   -Monitor hepatic panel on tx  -No indication for decadron at this time as pt currently saturating well on RA  -trend inflammatory markers: LDH, Ferritin, CRP , D-dimer  -  -F/u blood Cx x 2  -Trend fever curve  -neuro/ ID f/u noted
Suspect presentation likely metabolic encephalopathy in setting of COVID infection. -improving Mental status  s/p remdesivir   -Monitor hepatic panel on tx  -No indication for decadron at this time as pt currently saturating well on RA  -trend inflammatory markers: LDH, Ferritin, CRP , D-dimer  -  -F/u blood Cx x 2  -Trend fever curve  -neuro/ ID f/u noted

## 2022-08-26 NOTE — PROGRESS NOTE ADULT - PROVIDER SPECIALTY LIST ADULT
Cardiology
Neurology
Neurology
Cardiology
Internal Medicine

## 2022-08-26 NOTE — PROGRESS NOTE ADULT - PROBLEM SELECTOR PROBLEM 4
DM2 (diabetes mellitus, type 2)

## 2022-08-26 NOTE — PROGRESS NOTE ADULT - PROBLEM SELECTOR PLAN 7
-C/w home tamsulosin

## 2022-08-26 NOTE — PROGRESS NOTE ADULT - SUBJECTIVE AND OBJECTIVE BOX
Severo Hernandez MD  Interventional Cardiology / Endovascular Specialist  [place] Office : [address] [place] [address] [place] [address]  Tel:   [place] Office : [address] [facility] [place] [address]  Tel: [phone]    Subjective/Overnight events: Patient lying in bed comfortably. No acute distress.   	  MEDICATIONS:  aspirin enteric coated 81 milliGRAM(s) Oral daily  carvedilol 25 milliGRAM(s) Oral every 12 hours  enoxaparin Injectable 40 milliGRAM(s) SubCutaneous every 24 hours  lisinopril 20 milliGRAM(s) Oral daily  tamsulosin 0.4 milliGRAM(s) Oral at bedtime        acetaminophen     Tablet .. 650 milliGRAM(s) Oral every 6 hours PRN      atorvastatin 40 milliGRAM(s) Oral at bedtime  dextrose 50% Injectable 25 Gram(s) IV Push once  dextrose 50% Injectable 12.5 Gram(s) IV Push once  dextrose 50% Injectable 25 Gram(s) IV Push once  dextrose Oral Gel 15 Gram(s) Oral once  glucagon  Injectable 1 milliGRAM(s) IntraMuscular once  insulin lispro (ADMELOG) corrective regimen sliding scale   SubCutaneous three times a day before meals  insulin lispro (ADMELOG) corrective regimen sliding scale   SubCutaneous at bedtime        PAST MEDICAL/SURGICAL HISTORY  PAST MEDICAL & SURGICAL HISTORY:  Type 2 diabetes mellitus      CVA (cerebrovascular accident)      HTN (hypertension)      History of BPH      No significant past surgical history          SOCIAL HISTORY: Substance Use (street drugs): ( x ) never used  (  ) other:    FAMILY HISTORY:  No pertinent family history in first degree relatives        REVIEW OF SYSTEMS:  CONSTITUTIONAL: No fever, weight loss, or fatigue  EYES: No eye pain, visual disturbances, or discharge  ENMT:  No difficulty hearing, tinnitus, vertigo; No sinus or throat pain  BREASTS: No pain, masses, or nipple discharge  GASTROINTESTINAL: No abdominal or epigastric pain. No nausea, vomiting, or hematemesis; No diarrhea or constipation. No melena or hematochezia.  GENITOURINARY: No dysuria, frequency, hematuria, or incontinence  NEUROLOGICAL: No headaches, memory loss, loss of strength, numbness, or tremors  ENDOCRINE: No heat or cold intolerance; No hair loss  MUSCULOSKELETAL: No joint pain or swelling; No muscle, back, or extremity pain  PSYCHIATRIC: No depression, anxiety, mood swings, or difficulty sleeping  HEME/LYMPH: No easy bruising, or bleeding gums  All others negative    PHYSICAL EXAM:  T(C): 36.7 (08-26-22 @ 11:16), Max: 37 (08-25-22 @ 18:41)  HR: 78 (08-26-22 @ 11:16) (75 - 89)  BP: 122/77 (08-26-22 @ 11:16) (120/83 - 138/79)  RR: 18 (08-26-22 @ 11:16) (16 - 18)  SpO2: 99% (08-26-22 @ 11:16) (98% - 99%)  Wt(kg): --  I&O's Summary    GENERAL: NAD  EYES:   PERRLA   ENMT:   Moist mucous membranes, Good dentition, No lesions  Cardiovascular: Normal S1 S2, No JVD, No murmurs, No edema  Respiratory: Lungs clear to auscultation	  Gastrointestinal:  Soft, Non-tender, + BS	  Extremities: no edema            proBNP:   Lipid Profile:   HgA1c:   TSH:     Consultant(s) Notes Reviewed:  [x ] YES  [ ] NO    Care Discussed with Consultants/Other Providers [ x] YES  [ ] NO    Imaging Personally Reviewed independently:  [x] YES  [ ] NO    All labs, radiologic studies, vitals, orders and medications list reviewed. Patient is seen and examined at bedside. Case discussed with medical team.              
  Severo Hernandez MD  Interventional Cardiology / Endovascular Specialist  [place] Office : [address] [place] [address] [place] [address]  Tel:   [place] Office : [address] [facility] [place] [address]  Tel: [phone]      Subjective/Overnight events: Patient lying in bed comfortably. No acute distress. Denies chest pain, SOB or palpitations  	  MEDICATIONS:  aspirin enteric coated 81 milliGRAM(s) Oral daily  carvedilol 25 milliGRAM(s) Oral every 12 hours  enoxaparin Injectable 40 milliGRAM(s) SubCutaneous every 24 hours  lisinopril 20 milliGRAM(s) Oral daily  tamsulosin 0.4 milliGRAM(s) Oral at bedtime        acetaminophen     Tablet .. 650 milliGRAM(s) Oral every 6 hours PRN  LORazepam     Tablet 0.5 milliGRAM(s) Oral once PRN      atorvastatin 40 milliGRAM(s) Oral at bedtime  dextrose 50% Injectable 25 Gram(s) IV Push once  dextrose 50% Injectable 12.5 Gram(s) IV Push once  dextrose 50% Injectable 25 Gram(s) IV Push once  dextrose Oral Gel 15 Gram(s) Oral once  glucagon  Injectable 1 milliGRAM(s) IntraMuscular once  insulin lispro (ADMELOG) corrective regimen sliding scale   SubCutaneous three times a day before meals  insulin lispro (ADMELOG) corrective regimen sliding scale   SubCutaneous at bedtime        PAST MEDICAL/SURGICAL HISTORY  PAST MEDICAL & SURGICAL HISTORY:  Type 2 diabetes mellitus      CVA (cerebrovascular accident)      HTN (hypertension)      History of BPH      No significant past surgical history          SOCIAL HISTORY: Substance Use (street drugs): ( x ) never used  (  ) other:    FAMILY HISTORY:  No pertinent family history in first degree relatives          PHYSICAL EXAM:  T(C): 36.9 (08-22-22 @ 05:01), Max: 36.9 (08-21-22 @ 22:44)  HR: 84 (08-22-22 @ 05:01) (70 - 84)  BP: 136/81 (08-22-22 @ 05:01) (136/81 - 140/66)  RR: 16 (08-22-22 @ 05:01) (16 - 18)  SpO2: 98% (08-22-22 @ 05:01) (98% - 100%)  Wt(kg): --  I&O's Summary    21 Aug 2022 07:01  -  22 Aug 2022 07:00  --------------------------------------------------------  IN: 200 mL / OUT: 0 mL / NET: 200 mL        GENERAL: NAD  EYES:   PERRLA   ENMT:   Moist mucous membranes, Good dentition, No lesions  Cardiovascular: Normal S1 S2, No JVD, No murmurs, No edema  Respiratory: Lungs clear to auscultation	  Gastrointestinal:  Soft, Non-tender, + BS	  Extremities: no edema                            13.5   8.09  )-----------( 159      ( 21 Aug 2022 05:52 )             41.2     08-21    135  |  105  |  20  ----------------------------<  180<H>  4.3   |  22  |  1.11    Ca    9.1      21 Aug 2022 05:52  Phos  3.0     08-21  Mg     2.10     08-21      proBNP:   Lipid Profile:   HgA1c:   TSH:     Consultant(s) Notes Reviewed:  [x ] YES  [ ] NO    Care Discussed with Consultants/Other Providers [ x] YES  [ ] NO    Imaging Personally Reviewed independently:  [x] YES  [ ] NO    All labs, radiologic studies, vitals, orders and medications list reviewed. Patient is seen and examined at bedside. Case discussed with medical team.              
Neurology consult    Patient seen and examined this am. No new events          MEDICATIONS:    acetaminophen     Tablet .. 650 milliGRAM(s) Oral every 6 hours PRN  aspirin enteric coated 81 milliGRAM(s) Oral daily  atorvastatin 40 milliGRAM(s) Oral at bedtime  carvedilol 25 milliGRAM(s) Oral every 12 hours  dextrose 50% Injectable 25 Gram(s) IV Push once  dextrose 50% Injectable 12.5 Gram(s) IV Push once  dextrose 50% Injectable 25 Gram(s) IV Push once  dextrose Oral Gel 15 Gram(s) Oral once  enoxaparin Injectable 40 milliGRAM(s) SubCutaneous every 24 hours  glucagon  Injectable 1 milliGRAM(s) IntraMuscular once  insulin lispro (ADMELOG) corrective regimen sliding scale   SubCutaneous three times a day before meals  insulin lispro (ADMELOG) corrective regimen sliding scale   SubCutaneous at bedtime  lisinopril 20 milliGRAM(s) Oral daily  LORazepam     Tablet 0.5 milliGRAM(s) Oral once PRN  tamsulosin 0.4 milliGRAM(s) Oral at bedtime      LABS:                          13.5   8.09  )-----------( 159      ( 21 Aug 2022 05:52 )             41.2     08-21    135  |  105  |  20  ----------------------------<  180<H>  4.3   |  22  |  1.11    Ca    9.1      21 Aug 2022 05:52  Phos  3.0     08-21  Mg     2.10     08-21      CAPILLARY BLOOD GLUCOSE      POCT Blood Glucose.: 202 mg/dL (22 Aug 2022 12:11)  POCT Blood Glucose.: 159 mg/dL (22 Aug 2022 08:32)  POCT Blood Glucose.: 149 mg/dL (21 Aug 2022 21:18)  POCT Blood Glucose.: 151 mg/dL (21 Aug 2022 17:19)        I&O's Summary    21 Aug 2022 07:01  -  22 Aug 2022 07:00  --------------------------------------------------------  IN: 200 mL / OUT: 0 mL / NET: 200 mL      Vital Signs Last 24 Hrs  T(C): 36.9 (22 Aug 2022 12:43), Max: 36.9 (21 Aug 2022 22:44)  T(F): 98.5 (22 Aug 2022 12:43), Max: 98.5 (21 Aug 2022 22:44)  HR: 80 (22 Aug 2022 12:43) (70 - 84)  BP: 154/64 (22 Aug 2022 12:43) (136/81 - 154/64)  BP(mean): --  RR: 18 (22 Aug 2022 12:43) (16 - 18)  SpO2: 99% (22 Aug 2022 12:43) (98% - 100%)    Parameters below as of 22 Aug 2022 12:43  Patient On (Oxygen Delivery Method): room air        On Neurological Examination:    Mental Status - eyes closed opens tovoic states name and he is feeling good  Cranial Nerves - PERRL, EOMI,BTT bilaterally no gross facial asymmetry  Motor Exam -   withdraws all extremities RUE > LUE? paratonia    Sensory    Intact to light touch and pinprick bilaterally    Coord: FTN intact bilaterally     reflees brisk toes up         RADIOLOGY  < from: CT Head No Cont (08.16.22 @ 10:13) >  IMPRESSION: No acute intracranial hemorrhage, mass effect, or shift of   the midline structures.    Multiple chronic lacunar infarcts within the bilateral basal ganglia and   thalami.    Moderate severity chronic white matter microvascular type changes.    --- End of Report ---    < end of copied text >      Clinical Impression:  Abnormal EEG study  Mild diffuse/multifocal cerebral dysfunction, not specific as to etiology.  There were no epileptiform abnormalities recorded.      Adi Myers MD  Epilepsy Fellow , [facility]  Department of Neurology, [facility]    Manpreet Wilkerson MD  EEG/Epilepsy Attending     [facility] EEG Reading Room Ph#: [phone]  Epilepsy Answering Service after 5PM and before 8:30AM: Ph#: [phone]        Electronic Signatures:  dAi Myers)  (Signed 18-Aug-2022 12:02)  	Authored: TECH INFORMATION, EEG REPORT  Manpreet Wilkerson)  (Signed 18-Aug-2022 12:15)  	Authored: EEG REPORT  	Co-Signer: TECH INFORMATION, EEG REPORT      Last Updated: 18-Aug-2022 12:15 by Manpreet Wilkerson)        
  Severo Hernandez MD  Interventional Cardiology / Endovascular Specialist  [place] Office : [address] [place] [address]  Tel:   [place] Office : [address] [facility] [place] [address]  Tel: [phone]      Subjective/Overnight events: Patient lying in bed comfortably. No acute distress.   	  MEDICATIONS:  aspirin enteric coated 81 milliGRAM(s) Oral daily  carvedilol 25 milliGRAM(s) Oral every 12 hours  enoxaparin Injectable 40 milliGRAM(s) SubCutaneous every 24 hours  lisinopril 20 milliGRAM(s) Oral daily  tamsulosin 0.4 milliGRAM(s) Oral at bedtime        acetaminophen     Tablet .. 650 milliGRAM(s) Oral every 6 hours PRN      atorvastatin 40 milliGRAM(s) Oral at bedtime  dextrose 50% Injectable 25 Gram(s) IV Push once  dextrose 50% Injectable 12.5 Gram(s) IV Push once  dextrose 50% Injectable 25 Gram(s) IV Push once  dextrose Oral Gel 15 Gram(s) Oral once  glucagon  Injectable 1 milliGRAM(s) IntraMuscular once  insulin lispro (ADMELOG) corrective regimen sliding scale   SubCutaneous three times a day before meals  insulin lispro (ADMELOG) corrective regimen sliding scale   SubCutaneous at bedtime        PAST MEDICAL/SURGICAL HISTORY  PAST MEDICAL & SURGICAL HISTORY:  Type 2 diabetes mellitus      CVA (cerebrovascular accident)      HTN (hypertension)      History of BPH      No significant past surgical history          SOCIAL HISTORY: Substance Use (street drugs): ( x ) never used  (  ) other:    FAMILY HISTORY:  No pertinent family history in first degree relatives      PHYSICAL EXAM:  T(C): 36.9 (08-24-22 @ 11:05), Max: 37.1 (08-23-22 @ 22:15)  HR: 76 (08-24-22 @ 11:05) (74 - 89)  BP: 143/82 (08-24-22 @ 11:05) (117/60 - 165/78)  RR: 17 (08-24-22 @ 11:05) (17 - 18)  SpO2: 100% (08-24-22 @ 11:05) (99% - 100%)  Wt(kg): --  I&O's Summary    GENERAL: NAD  EYES:   PERRLA   ENMT:   Moist mucous membranes, Good dentition, No lesions  Cardiovascular: Normal S1 S2, No JVD, No murmurs, No edema  Respiratory: Lungs clear to auscultation	  Gastrointestinal:  Soft, Non-tender, + BS	  Extremities: no edema                            13.5   10.06 )-----------( 194      ( 24 Aug 2022 06:40 )             41.2     08-24    138  |  104  |  14  ----------------------------<  132<H>  4.3   |  24  |  1.04    Ca    9.6      24 Aug 2022 06:40  Phos  3.2     08-24  Mg     2.10     08-24      proBNP:   Lipid Profile:   HgA1c:   TSH:     Consultant(s) Notes Reviewed:  [x ] YES  [ ] NO    Care Discussed with Consultants/Other Providers [ x] YES  [ ] NO    Imaging Personally Reviewed independently:  [x] YES  [ ] NO    All labs, radiologic studies, vitals, orders and medications list reviewed. Patient is seen and examined at bedside. Case discussed with medical team.              
Severo Hernandez MD  Interventional Cardiology / Advance Heart Failure and Cardiac Transplant Specialist  [place] Office : [address] [place] [address] [place] [address]  Tel: [phone]  [place] Office : [address] [facility] [place] [address]  Tel: [phone]       Pt is lying in bed comfortable not in distress, no chest pains no SOB no palpitations  	  MEDICATIONS:  aspirin enteric coated 81 milliGRAM(s) Oral daily  carvedilol 12.5 milliGRAM(s) Oral every 12 hours  enoxaparin Injectable 40 milliGRAM(s) SubCutaneous every 24 hours  lisinopril 20 milliGRAM(s) Oral daily  tamsulosin 0.4 milliGRAM(s) Oral at bedtime        acetaminophen     Tablet .. 650 milliGRAM(s) Oral every 6 hours PRN      atorvastatin 40 milliGRAM(s) Oral at bedtime  dextrose 50% Injectable 25 Gram(s) IV Push once  dextrose 50% Injectable 12.5 Gram(s) IV Push once  dextrose 50% Injectable 25 Gram(s) IV Push once  dextrose Oral Gel 15 Gram(s) Oral once  glucagon  Injectable 1 milliGRAM(s) IntraMuscular once  insulin lispro (ADMELOG) corrective regimen sliding scale   SubCutaneous three times a day before meals  insulin lispro (ADMELOG) corrective regimen sliding scale   SubCutaneous at bedtime        PAST MEDICAL/SURGICAL HISTORY  PAST MEDICAL & SURGICAL HISTORY:  Type 2 diabetes mellitus      CVA (cerebrovascular accident)      HTN (hypertension)      History of BPH      No significant past surgical history          SOCIAL HISTORY: Substance Use (street drugs): ( x ) never used  (  ) other:    FAMILY HISTORY:  No pertinent family history in first degree relatives           PHYSICAL EXAM:  T(C): 37.2 (08-21-22 @ 10:39), Max: 37.2 (08-21-22 @ 10:39)  HR: 79 (08-21-22 @ 10:39) (79 - 88)  BP: 151/58 (08-21-22 @ 10:39) (139/62 - 160/64)  RR: 18 (08-21-22 @ 10:39) (18 - 18)  SpO2: 100% (08-21-22 @ 10:39) (97% - 100%)  Wt(kg): --  I&O's Summary        GENERAL: NAD  EYES:   PERRLA   ENMT:   Moist mucous membranes, Good dentition, No lesions  Cardiovascular: Normal S1 S2, No JVD, No murmurs, No edema  Respiratory: Lungs clear to auscultation	  Gastrointestinal:  Soft, Non-tender, + BS	  Extremities: no edema                                    13.5   8.09  )-----------( 159      ( 21 Aug 2022 05:52 )             41.2     08-21    135  |  105  |  20  ----------------------------<  180<H>  4.3   |  22  |  1.11    Ca    9.1      21 Aug 2022 05:52  Phos  3.0     08-21  Mg     2.10     08-21      proBNP:   Lipid Profile:   HgA1c:   TSH:     Consultant(s) Notes Reviewed:  [x ] YES  [ ] NO    Care Discussed with Consultants/Other Providers [ x] YES  [ ] NO    Imaging Personally Reviewed independently:  [x] YES  [ ] NO    All labs, radiologic studies, vitals, orders and medications list reviewed. Patient is seen and examined at bedside. Case discussed with medical team.        
  Severo Hernandez MD  Interventional Cardiology / Endovascular Specialist  [place] Office : [address] [place] [address]. [address]  Tel:   [place] Office : [address] [facility] [place] [address]  Tel: [phone]      Subjective/Overnight events: Patient lying in bed comfortably. No acute distress.   	  MEDICATIONS:  aspirin enteric coated 81 milliGRAM(s) Oral daily  carvedilol 25 milliGRAM(s) Oral every 12 hours  enoxaparin Injectable 40 milliGRAM(s) SubCutaneous every 24 hours  lisinopril 20 milliGRAM(s) Oral daily  tamsulosin 0.4 milliGRAM(s) Oral at bedtime        acetaminophen     Tablet .. 650 milliGRAM(s) Oral every 6 hours PRN      atorvastatin 40 milliGRAM(s) Oral at bedtime  dextrose 50% Injectable 25 Gram(s) IV Push once  dextrose 50% Injectable 12.5 Gram(s) IV Push once  dextrose 50% Injectable 25 Gram(s) IV Push once  dextrose Oral Gel 15 Gram(s) Oral once  glucagon  Injectable 1 milliGRAM(s) IntraMuscular once  insulin lispro (ADMELOG) corrective regimen sliding scale   SubCutaneous three times a day before meals  insulin lispro (ADMELOG) corrective regimen sliding scale   SubCutaneous at bedtime        PAST MEDICAL/SURGICAL HISTORY  PAST MEDICAL & SURGICAL HISTORY:  Type 2 diabetes mellitus      CVA (cerebrovascular accident)      HTN (hypertension)      History of BPH      No significant past surgical history          SOCIAL HISTORY: Substance Use (street drugs): ( x ) never used  (  ) other:    FAMILY HISTORY:  No pertinent family history in first degree relatives        PHYSICAL EXAM:  T(C): 37.1 (08-25-22 @ 10:44), Max: 37.1 (08-25-22 @ 10:44)  HR: 83 (08-25-22 @ 10:44) (62 - 83)  BP: 126/86 (08-25-22 @ 10:44) (115/88 - 136/72)  RR: 18 (08-25-22 @ 10:44) (17 - 18)  SpO2: 100% (08-25-22 @ 10:44) (100% - 100%)  Wt(kg): --  I&O's Summary      GENERAL: NAD  EYES:   PERRLA   ENMT:   Moist mucous membranes, Good dentition, No lesions  Cardiovascular: Normal S1 S2, No JVD, No murmurs, No edema  Respiratory: Lungs clear to auscultation	  Gastrointestinal:  Soft, Non-tender, + BS	  Extremities: no edema                                  13.5   10.06 )-----------( 194      ( 24 Aug 2022 06:40 )             41.2     08-24    138  |  104  |  14  ----------------------------<  132<H>  4.3   |  24  |  1.04    Ca    9.6      24 Aug 2022 06:40  Phos  3.2     08-24  Mg     2.10     08-24      proBNP:   Lipid Profile:   HgA1c:   TSH:     Consultant(s) Notes Reviewed:  [x ] YES  [ ] NO    Care Discussed with Consultants/Other Providers [ x] YES  [ ] NO    Imaging Personally Reviewed independently:  [x] YES  [ ] NO    All labs, radiologic studies, vitals, orders and medications list reviewed. Patient is seen and examined at bedside. Case discussed with medical team.              
  Severo Hernandez MD  Interventional Cardiology / Endovascular Specialist  [place] Office : [address] [place] [address] [place] [address]  Tel:   [place] Office : [address] [facility] [place] [address]  Tel: [phone]      Subjective/Overnight events: Patient lying in bed comfortably. No acute distress. Denies chest pain, SOB or palpitations  	  MEDICATIONS:  aspirin enteric coated 81 milliGRAM(s) Oral daily  carvedilol 25 milliGRAM(s) Oral every 12 hours  enoxaparin Injectable 40 milliGRAM(s) SubCutaneous every 24 hours  lisinopril 20 milliGRAM(s) Oral daily  tamsulosin 0.4 milliGRAM(s) Oral at bedtime        acetaminophen     Tablet .. 650 milliGRAM(s) Oral every 6 hours PRN      atorvastatin 40 milliGRAM(s) Oral at bedtime  dextrose 50% Injectable 25 Gram(s) IV Push once  dextrose 50% Injectable 12.5 Gram(s) IV Push once  dextrose 50% Injectable 25 Gram(s) IV Push once  dextrose Oral Gel 15 Gram(s) Oral once  glucagon  Injectable 1 milliGRAM(s) IntraMuscular once  insulin lispro (ADMELOG) corrective regimen sliding scale   SubCutaneous three times a day before meals  insulin lispro (ADMELOG) corrective regimen sliding scale   SubCutaneous at bedtime        PAST MEDICAL/SURGICAL HISTORY  PAST MEDICAL & SURGICAL HISTORY:  Type 2 diabetes mellitus      CVA (cerebrovascular accident)      HTN (hypertension)      History of BPH      No significant past surgical history          SOCIAL HISTORY: Substance Use (street drugs): ( x ) never used  (  ) other:    FAMILY HISTORY:  No pertinent family history in first degree relatives          PHYSICAL EXAM:  T(C): 36.8 (08-23-22 @ 13:50), Max: 36.8 (08-22-22 @ 18:17)  HR: 76 (08-23-22 @ 13:50) (76 - 91)  BP: 166/83 (08-23-22 @ 13:50) (132/65 - 166/83)  RR: 17 (08-23-22 @ 13:50) (16 - 17)  SpO2: 100% (08-23-22 @ 13:50) (99% - 100%)  Wt(kg): --  I&O's Summary          GENERAL: NAD  EYES:   PERRLA   ENMT:   Moist mucous membranes, Good dentition, No lesions  Cardiovascular: Normal S1 S2, No JVD, No murmurs, No edema  Respiratory: Lungs clear to auscultation	  Gastrointestinal:  Soft, Non-tender, + BS	  Extremities: no edema                                  13.4   11.24 )-----------( 189      ( 23 Aug 2022 06:40 )             41.2     08-23    137  |  105  |  17  ----------------------------<  201<H>  4.6   |  23  |  1.15    Ca    9.6      23 Aug 2022 06:40  Phos  3.0     08-23  Mg     2.10     08-23      proBNP:   Lipid Profile:   HgA1c:   TSH:     Consultant(s) Notes Reviewed:  [x ] YES  [ ] NO    Care Discussed with Consultants/Other Providers [ x] YES  [ ] NO    Imaging Personally Reviewed independently:  [x] YES  [ ] NO    All labs, radiologic studies, vitals, orders and medications list reviewed. Patient is seen and examined at bedside. Case discussed with medical team.              
Neurology consult    Patient seen and examined this am. No new events        MEDICATIONS:    acetaminophen     Tablet .. 650 milliGRAM(s) Oral every 6 hours PRN  aspirin enteric coated 81 milliGRAM(s) Oral daily  atorvastatin 40 milliGRAM(s) Oral at bedtime  carvedilol 25 milliGRAM(s) Oral every 12 hours  dextrose 50% Injectable 25 Gram(s) IV Push once  dextrose 50% Injectable 12.5 Gram(s) IV Push once  dextrose 50% Injectable 25 Gram(s) IV Push once  dextrose Oral Gel 15 Gram(s) Oral once  enoxaparin Injectable 40 milliGRAM(s) SubCutaneous every 24 hours  glucagon  Injectable 1 milliGRAM(s) IntraMuscular once  insulin lispro (ADMELOG) corrective regimen sliding scale   SubCutaneous three times a day before meals  insulin lispro (ADMELOG) corrective regimen sliding scale   SubCutaneous at bedtime  lisinopril 20 milliGRAM(s) Oral daily  tamsulosin 0.4 milliGRAM(s) Oral at bedtime      LABS:                          13.4   11.24 )-----------( 189      ( 23 Aug 2022 06:40 )             41.2     08-23    137  |  105  |  17  ----------------------------<  201<H>  4.6   |  23  |  1.15    Ca    9.6      23 Aug 2022 06:40  Phos  3.0     08-23  Mg     2.10     08-23      CAPILLARY BLOOD GLUCOSE      POCT Blood Glucose.: 164 mg/dL (23 Aug 2022 08:32)  POCT Blood Glucose.: 180 mg/dL (22 Aug 2022 21:28)  POCT Blood Glucose.: 129 mg/dL (22 Aug 2022 17:25)  POCT Blood Glucose.: 202 mg/dL (22 Aug 2022 12:11)        I&O's Summary    Vital Signs Last 24 Hrs  T(C): 36.7 (23 Aug 2022 05:03), Max: 36.9 (22 Aug 2022 12:43)  T(F): 98 (23 Aug 2022 05:03), Max: 98.5 (22 Aug 2022 12:43)  HR: 91 (23 Aug 2022 05:03) (76 - 91)  BP: 132/65 (23 Aug 2022 05:03) (132/65 - 163/66)  BP(mean): --  RR: 17 (23 Aug 2022 05:03) (16 - 18)  SpO2: 99% (23 Aug 2022 05:03) (99% - 100%)    Parameters below as of 23 Aug 2022 05:03  Patient On (Oxygen Delivery Method): room air          On Neurological Examination:    Mental Status - eyes closed opens tovoic states name and he is feeling good  Cranial Nerves - PERRL, EOMI,BTT bilaterally no gross facial asymmetry  Motor Exam -   withdraws all extremities RUE > LUE? paratonia    Sensory    Intact to light touch and pinprick bilaterally    Coord: FTN intact bilaterally            RADIOLOGY  < from: CT Head No Cont (08.16.22 @ 10:13) >  IMPRESSION: No acute intracranial hemorrhage, mass effect, or shift of   the midline structures.    Multiple chronic lacunar infarcts within the bilateral basal ganglia and   thalami.    Moderate severity chronic white matter microvascular type changes.    --- End of Report ---    < end of copied text >      Clinical Impression:  Abnormal EEG study  Mild diffuse/multifocal cerebral dysfunction, not specific as to etiology.  There were no epileptiform abnormalities recorded.      Adi Myers MD  Epilepsy Fellow , [facility]  Department of Neurology, [facility]    Manpreet Wilkerson MD  EEG/Epilepsy Attending     [facility] EEG Reading Room Ph#: [phone]  Epilepsy Answering Service after 5PM and before 8:30AM: Ph#: [phone]        Electronic Signatures:  Adi Myers)  (Signed 18-Aug-2022 12:02)  	Authored: TECH INFORMATION, EEG REPORT  Manpreet Wilkerson)  (Signed 18-Aug-2022 12:15)  	Authored: EEG REPORT  	Co-Signer: TECH INFORMATION, EEG REPORT      Last Updated: 18-Aug-2022 12:15 by Manpreet Wilkerson)    < from: MR Head No Cont (08.22.22 @ 14:54) >  IMPRESSION: Subcentimeter foci of abnormal susceptibility is seen   involving the posterior fossa and supratentorial region as described   above.    --- End of Report ---    < end of copied text >    
    SUBJECTIVE / OVERNIGHT EVENTS:pt seen and examined, confused , not following commands    MEDICATIONS  (STANDING):  aspirin enteric coated 81 milliGRAM(s) Oral daily  atorvastatin 40 milliGRAM(s) Oral at bedtime  carvedilol 25 milliGRAM(s) Oral every 12 hours  dextrose 50% Injectable 25 Gram(s) IV Push once  dextrose 50% Injectable 12.5 Gram(s) IV Push once  dextrose 50% Injectable 25 Gram(s) IV Push once  dextrose Oral Gel 15 Gram(s) Oral once  enoxaparin Injectable 40 milliGRAM(s) SubCutaneous every 24 hours  glucagon  Injectable 1 milliGRAM(s) IntraMuscular once  insulin lispro (ADMELOG) corrective regimen sliding scale   SubCutaneous three times a day before meals  insulin lispro (ADMELOG) corrective regimen sliding scale   SubCutaneous at bedtime  lisinopril 20 milliGRAM(s) Oral daily  tamsulosin 0.4 milliGRAM(s) Oral at bedtime    MEDICATIONS  (PRN):  acetaminophen     Tablet .. 650 milliGRAM(s) Oral every 6 hours PRN Temp greater or equal to 38C (100.4F), Mild Pain (1 - 3), Moderate Pain (4 - 6)    Vital Signs Last 24 Hrs  T(C): 36.9 (08-24-22 @ 11:05), Max: 37.1 (08-23-22 @ 22:15)  T(F): 98.5 (08-24-22 @ 11:05), Max: 98.7 (08-23-22 @ 22:15)  HR: 76 (08-24-22 @ 11:05) (74 - 89)  BP: 143/82 (08-24-22 @ 11:05) (117/60 - 154/80)  BP(mean): --  RR: 17 (08-24-22 @ 11:05) (17 - 17)  SpO2: 100% (08-24-22 @ 11:05) (99% - 100%)          PHYSICAL EXAM:  GENERAL: NAD  EYES: EOMI, PERRLA  NECK: Supple, No JVD  CHEST/LUNG: dec breath sounds at bases  HEART:  S1 , S2 +  ABDOMEN: soft , bs+  EXTREMITIES:  no edema  NEUROLOGY:alert awake confused      LABS:  08-24    138  |  104  |  14  ----------------------------<  132<H>  4.3   |  24  |  1.04    Ca    9.6      24 Aug 2022 06:40  Phos  3.2     08-24  Mg     2.10     08-24      Creatinine Trend: 1.04 <--, 1.15 <--, 1.11 <--, 1.15 <--, 1.12 <--, 1.19 <--                        13.5   10.06 )-----------( 194      ( 24 Aug 2022 06:40 )             41.2     Urine Studies:                  RADIOLOGY & ADDITIONAL TESTS:    Imaging Personally Reviewed:    Consultant(s) Notes Reviewed:      Care Discussed with Consultants/Other Providers:  
    SUBJECTIVE / OVERNIGHT EVENTS:pt seen and examined, confused , not following commands    MEDICATIONS  (STANDING):  aspirin enteric coated 81 milliGRAM(s) Oral daily  atorvastatin 40 milliGRAM(s) Oral at bedtime  carvedilol 25 milliGRAM(s) Oral every 12 hours  dextrose 50% Injectable 25 Gram(s) IV Push once  dextrose 50% Injectable 12.5 Gram(s) IV Push once  dextrose 50% Injectable 25 Gram(s) IV Push once  dextrose Oral Gel 15 Gram(s) Oral once  enoxaparin Injectable 40 milliGRAM(s) SubCutaneous every 24 hours  glucagon  Injectable 1 milliGRAM(s) IntraMuscular once  insulin lispro (ADMELOG) corrective regimen sliding scale   SubCutaneous three times a day before meals  insulin lispro (ADMELOG) corrective regimen sliding scale   SubCutaneous at bedtime  lisinopril 20 milliGRAM(s) Oral daily  tamsulosin 0.4 milliGRAM(s) Oral at bedtime    MEDICATIONS  (PRN):  acetaminophen     Tablet .. 650 milliGRAM(s) Oral every 6 hours PRN Temp greater or equal to 38C (100.4F), Mild Pain (1 - 3), Moderate Pain (4 - 6)    Vital Signs Last 24 Hrs  T(C): 37.2 (08-26-22 @ 22:27), Max: 37.2 (08-26-22 @ 22:27)  T(F): 99 (08-26-22 @ 22:27), Max: 99 (08-26-22 @ 22:27)  HR: 75 (08-26-22 @ 22:27) (75 - 89)  BP: 152/80 (08-26-22 @ 22:27) (122/77 - 154/72)  BP(mean): --  RR: 18 (08-26-22 @ 22:27) (18 - 18)  SpO2: 97% (08-26-22 @ 22:27) (97% - 99%)          PHYSICAL EXAM:  GENERAL: NAD  EYES: EOMI, PERRLA  NECK: Supple, No JVD  CHEST/LUNG: dec breath sounds at bases  HEART:  S1 , S2 +  ABDOMEN: soft , bs+  EXTREMITIES:  no edema  NEUROLOGY:alert awake confused      LABS:        Creatinine Trend: 1.04 <--, 1.15 <--, 1.11 <--, 1.15 <--    Urine Studies:                            RADIOLOGY & ADDITIONAL TESTS:    Imaging Personally Reviewed:    Consultant(s) Notes Reviewed:  yes    Care Discussed with Consultants/Other Providers:yes  
    SUBJECTIVE / OVERNIGHT EVENTS:pt seen and examined, confused , not following commands    MEDICATIONS  (STANDING):  aspirin enteric coated 81 milliGRAM(s) Oral daily  atorvastatin 40 milliGRAM(s) Oral at bedtime  carvedilol 25 milliGRAM(s) Oral every 12 hours  dextrose 50% Injectable 25 Gram(s) IV Push once  dextrose 50% Injectable 12.5 Gram(s) IV Push once  dextrose 50% Injectable 25 Gram(s) IV Push once  dextrose Oral Gel 15 Gram(s) Oral once  enoxaparin Injectable 40 milliGRAM(s) SubCutaneous every 24 hours  glucagon  Injectable 1 milliGRAM(s) IntraMuscular once  insulin lispro (ADMELOG) corrective regimen sliding scale   SubCutaneous three times a day before meals  insulin lispro (ADMELOG) corrective regimen sliding scale   SubCutaneous at bedtime  lisinopril 20 milliGRAM(s) Oral daily  tamsulosin 0.4 milliGRAM(s) Oral at bedtime    MEDICATIONS  (PRN):  acetaminophen     Tablet .. 650 milliGRAM(s) Oral every 6 hours PRN Temp greater or equal to 38C (100.4F), Mild Pain (1 - 3), Moderate Pain (4 - 6)    Vital Signs Last 24 Hrs  T(C): 36.9 (08-25-22 @ 21:20), Max: 37.1 (08-25-22 @ 10:44)  T(F): 98.5 (08-25-22 @ 21:20), Max: 98.7 (08-25-22 @ 10:44)  HR: 75 (08-25-22 @ 21:20) (62 - 83)  BP: 133/84 (08-25-22 @ 21:20) (115/88 - 133/84)  BP(mean): --  RR: 18 (08-25-22 @ 21:20) (16 - 18)  SpO2: 99% (08-25-22 @ 21:20) (99% - 100%)          PHYSICAL EXAM:  GENERAL: NAD  EYES: EOMI, PERRLA  NECK: Supple, No JVD  CHEST/LUNG: dec breath sounds at bases  HEART:  S1 , S2 +  ABDOMEN: soft , bs+  EXTREMITIES:  no edema  NEUROLOGY:alert awake confused      LABS:  08-24    138  |  104  |  14  ----------------------------<  132<H>  4.3   |  24  |  1.04    Ca    9.6      24 Aug 2022 06:40  Phos  3.2     08-24  Mg     2.10     08-24      Creatinine Trend: 1.04 <--, 1.15 <--, 1.11 <--, 1.15 <--, 1.12 <--                        13.5   10.06 )-----------( 194      ( 24 Aug 2022 06:40 )             41.2     Urine Studies:                            RADIOLOGY & ADDITIONAL TESTS:    Imaging Personally Reviewed:    Consultant(s) Notes Reviewed:  yes    Care Discussed with Consultants/Other Providers:yes  
    SUBJECTIVE / OVERNIGHT EVENTS:pt seen and examined        MEDICATIONS  (STANDING):  aspirin enteric coated 81 milliGRAM(s) Oral daily  atorvastatin 40 milliGRAM(s) Oral at bedtime  carvedilol 6.25 milliGRAM(s) Oral every 12 hours  dextrose 50% Injectable 25 Gram(s) IV Push once  dextrose 50% Injectable 12.5 Gram(s) IV Push once  dextrose 50% Injectable 25 Gram(s) IV Push once  dextrose Oral Gel 15 Gram(s) Oral once  enoxaparin Injectable 40 milliGRAM(s) SubCutaneous every 24 hours  glucagon  Injectable 1 milliGRAM(s) IntraMuscular once  insulin lispro (ADMELOG) corrective regimen sliding scale   SubCutaneous three times a day before meals  insulin lispro (ADMELOG) corrective regimen sliding scale   SubCutaneous at bedtime  lisinopril 20 milliGRAM(s) Oral daily  remdesivir  IVPB   IV Intermittent   remdesivir  IVPB 100 milliGRAM(s) IV Intermittent every 24 hours  tamsulosin 0.4 milliGRAM(s) Oral at bedtime    MEDICATIONS  (PRN):  acetaminophen     Tablet .. 650 milliGRAM(s) Oral every 6 hours PRN Temp greater or equal to 38C (100.4F), Mild Pain (1 - 3), Moderate Pain (4 - 6)    Vital Signs Last 24 Hrs  T(C): 36.8 (22 @ 21:30), Max: 36.9 (22 @ 05:00)  T(F): 98.3 (22 @ 21:30), Max: 98.4 (22 @ 05:00)  HR: 92 (22 @ 21:30) (70 - 92)  BP: 180/63 (22 @ 21:30) (140/75 - 180/63)  BP(mean): --  RR: 17 (22 @ 21:30) (16 - 18)  SpO2: 100% (22 @ 21:30) (98% - 100%)        PHYSICAL EXAM:  GENERAL: NAD  EYES: EOMI, PERRLA  NECK: Supple, No JVD  CHEST/LUNG: dec breath sounds at bases  HEART:  S1 , S2 +  ABDOMEN: soft, bs+  EXTREMITIES:trace edema+    NEUROLOGY:alert awake confused    LABS:      137  |  105  |  17  ----------------------------<  141<H>  4.3   |  23  |  1.19    Ca    9.3      18 Aug 2022 07:05  Phos  2.6       Mg     2.00         TPro  7.4  /  Alb  4.1  /  TBili  0.4  /  DBili      /  AST  22  /  ALT  19  /  AlkPhos  90      Creatinine Trend: 1.19 <--, 1.32 <--, 1.22 <--                        13.2   10.42 )-----------( 137      ( 18 Aug 2022 07:05 )             40.4     Urine Studies:  Urinalysis Basic - ( 17 Aug 2022 18:00 )    Color: Light Yellow / Appearance: Clear / S.013 / pH:   Gluc:  / Ketone: Negative  / Bili: Negative / Urobili: <2 mg/dL   Blood:  / Protein: Negative / Nitrite: Negative   Leuk Esterase: Negative / RBC:  / WBC    Sq Epi:  / Non Sq Epi:  / Bacteria:               LIVER FUNCTIONS - ( 17 Aug 2022 04:27 )  Alb: 4.1 g/dL / Pro: 7.4 g/dL / ALK PHOS: 90 U/L / ALT: 19 U/L / AST: 22 U/L / GGT: x           PT/INR - ( 17 Aug 2022 04:27 )   PT: 14.5 sec;   INR: 1.25 ratio             RADIOLOGY & ADDITIONAL TESTS:    Imaging Personally Reviewed:    Consultant(s) Notes Reviewed:      Care Discussed with Consultants/Other Providers:  
SID PINTO  72y  Male      Patient is a 72y old  Male who presents with a chief complaint of AMS (21 Aug 2022 11:58)  Mental status improving,no sob,no cp,no fever,no cough    REVIEW OF SYSTEMS:  CONSTITUTIONAL: No fever  RESPIRATORY: No cough, hemoptysis or shortness of breath  CARDIOVASCULAR: No chest pain, palpitations, dizziness, or leg swelling  GASTROINTESTINAL: No abdominal pain. nausea, vomiting, hematemesis  GENITOURINARY: No dysuria, frequency, hematuria   NEUROLOGICAL: No headaches, no dizziness  MUSCULOSKELETAL: No joint pain or swelling;     INTERVAL HPI/OVERNIGHT EVENTS:  T(C): 36.8 (08-21-22 @ 17:03), Max: 37.2 (08-21-22 @ 10:39)  HR: 70 (08-21-22 @ 17:03) (70 - 83)  BP: 137/68 (08-21-22 @ 17:03) (137/68 - 151/58)  RR: 18 (08-21-22 @ 17:03) (18 - 18)  SpO2: 100% (08-21-22 @ 17:03) (97% - 100%)  Wt(kg): --  I&O's Summary    T(C): 36.8 (08-21-22 @ 17:03), Max: 37.2 (08-21-22 @ 10:39)  HR: 70 (08-21-22 @ 17:03) (70 - 83)  BP: 137/68 (08-21-22 @ 17:03) (137/68 - 151/58)  RR: 18 (08-21-22 @ 17:03) (18 - 18)  SpO2: 100% (08-21-22 @ 17:03) (97% - 100%)  Wt(kg): --Vital Signs Last 24 Hrs  T(C): 36.8 (21 Aug 2022 17:03), Max: 37.2 (21 Aug 2022 10:39)  T(F): 98.2 (21 Aug 2022 17:03), Max: 98.9 (21 Aug 2022 10:39)  HR: 70 (21 Aug 2022 17:03) (70 - 83)  BP: 137/68 (21 Aug 2022 17:03) (137/68 - 151/58)  BP(mean): --  RR: 18 (21 Aug 2022 17:03) (18 - 18)  SpO2: 100% (21 Aug 2022 17:03) (97% - 100%)    Parameters below as of 21 Aug 2022 17:03  Patient On (Oxygen Delivery Method): room air        LABS:                        13.5   8.09  )-----------( 159      ( 21 Aug 2022 05:52 )             41.2     08-21    135  |  105  |  20  ----------------------------<  180<H>  4.3   |  22  |  1.11    Ca    9.1      21 Aug 2022 05:52  Phos  3.0     08-21  Mg     2.10     08-21          CAPILLARY BLOOD GLUCOSE      POCT Blood Glucose.: 151 mg/dL (21 Aug 2022 17:19)  POCT Blood Glucose.: 191 mg/dL (21 Aug 2022 12:00)  POCT Blood Glucose.: 150 mg/dL (21 Aug 2022 08:07)  POCT Blood Glucose.: 204 mg/dL (20 Aug 2022 21:21)            PAST MEDICAL & SURGICAL HISTORY:  Type 2 diabetes mellitus      CVA (cerebrovascular accident)      HTN (hypertension)      History of BPH      No significant past surgical history          MEDICATIONS  (STANDING):  aspirin enteric coated 81 milliGRAM(s) Oral daily  atorvastatin 40 milliGRAM(s) Oral at bedtime  carvedilol 12.5 milliGRAM(s) Oral every 12 hours  dextrose 50% Injectable 25 Gram(s) IV Push once  dextrose 50% Injectable 12.5 Gram(s) IV Push once  dextrose 50% Injectable 25 Gram(s) IV Push once  dextrose Oral Gel 15 Gram(s) Oral once  enoxaparin Injectable 40 milliGRAM(s) SubCutaneous every 24 hours  glucagon  Injectable 1 milliGRAM(s) IntraMuscular once  insulin lispro (ADMELOG) corrective regimen sliding scale   SubCutaneous three times a day before meals  insulin lispro (ADMELOG) corrective regimen sliding scale   SubCutaneous at bedtime  lisinopril 20 milliGRAM(s) Oral daily  tamsulosin 0.4 milliGRAM(s) Oral at bedtime    MEDICATIONS  (PRN):  acetaminophen     Tablet .. 650 milliGRAM(s) Oral every 6 hours PRN Temp greater or equal to 38C (100.4F), Mild Pain (1 - 3), Moderate Pain (4 - 6)        RADIOLOGY & ADDITIONAL TESTS:    Imaging Personally Reviewed:  [ ] YES  [ ] NO    Consultant(s) Notes Reviewed:  [ x] YES  [ ] NO    PHYSICAL EXAM:  GENERAL: Alert and awake lying in bed in no distress  HEAD:  Atraumatic, Normocephalic  EYES: EOMI, SANDRINE, conjunctiva and sclera clear  NECK: Supple, No JVD, Normal thyroid  NERVOUS SYSTEM:  Alert & Oriented X3, Motor and sensory systems are intact,   CHEST/LUNG: Bilateral clear breath sounds, no rhochi, no wheezing, no crepitations,  HEART: Regular rate and rhythm; No murmurs, rubs, or gallops  ABDOMEN: Soft, Nontender, Nondistended; Bowel sounds present  EXTREMITIES:   Peripheral Pulses are palpable, no  edema        Care Discussed with Consultants/Other Providers [ ] YES  [ ] NO      Code Status: [] Full Code [] DNR [] DNI [] Goals of Care:   Disposition: [] ICU [] Stroke Unit [] RCU []PCU []Floor [] Discharge Home         [email or web address]    
    SUBJECTIVE / OVERNIGHT EVENTS:pt seen and examined  22    MEDICATIONS  (STANDING):  aspirin enteric coated 81 milliGRAM(s) Oral daily  atorvastatin 40 milliGRAM(s) Oral at bedtime  carvedilol 6.25 milliGRAM(s) Oral every 12 hours  dextrose 50% Injectable 25 Gram(s) IV Push once  dextrose 50% Injectable 12.5 Gram(s) IV Push once  dextrose 50% Injectable 25 Gram(s) IV Push once  dextrose Oral Gel 15 Gram(s) Oral once  enoxaparin Injectable 40 milliGRAM(s) SubCutaneous every 24 hours  glucagon  Injectable 1 milliGRAM(s) IntraMuscular once  insulin lispro (ADMELOG) corrective regimen sliding scale   SubCutaneous three times a day before meals  insulin lispro (ADMELOG) corrective regimen sliding scale   SubCutaneous at bedtime  lisinopril 20 milliGRAM(s) Oral daily  remdesivir  IVPB   IV Intermittent   remdesivir  IVPB 100 milliGRAM(s) IV Intermittent every 24 hours  tamsulosin 0.4 milliGRAM(s) Oral at bedtime    MEDICATIONS  (PRN):  acetaminophen     Tablet .. 650 milliGRAM(s) Oral every 6 hours PRN Temp greater or equal to 38C (100.4F), Mild Pain (1 - 3), Moderate Pain (4 - 6)    T(C): 37.3 (22 @ 19:36), Max: 38.1 (22 @ 18:32)  HR: 88 (22 @ 19:00) (88 - 100)  BP: 158/97 (22 @ 19:00) (147/83 - 161/109)  RR: 16 (22 @ 12:00) (16 - 18)  SpO2: 100% (22 @ 12:00) (97% - 100%)    CAPILLARY BLOOD GLUCOSE      POCT Blood Glucose.: 187 mg/dL (17 Aug 2022 21:07)  POCT Blood Glucose.: 178 mg/dL (17 Aug 2022 17:23)  POCT Blood Glucose.: 130 mg/dL (17 Aug 2022 12:22)  POCT Blood Glucose.: 145 mg/dL (17 Aug 2022 08:32)    I&O's Summary          PHYSICAL EXAM:  GENERAL: NAD  EYES: EOMI, PERRLA  NECK: Supple, No JVD  CHEST/LUNG: dec breath sounds at bases  HEART:  S1 , S2 +  ABDOMEN: soft, bs+  EXTREMITIES:trace edema+    NEUROLOGY:alert awake      LABS:                        14.2   9.76  )-----------( 152      ( 17 Aug 2022 04:27 )             44.0     08-17    135  |  99  |  19  ----------------------------<  135<H>  4.2   |  24  |  1.32<H>    Ca    9.8      17 Aug 2022 04:27  Phos  2.8     08-  Mg     1.90         TPro  7.4  /  Alb  4.1  /  TBili  0.4  /  DBili  x   /  AST  22  /  ALT  19  /  AlkPhos  90  -17    PT/INR - ( 17 Aug 2022 04:27 )   PT: 14.5 sec;   INR: 1.25 ratio         PTT - ( 16 Aug 2022 05:05 )  PTT:31.3 sec      Urinalysis Basic - ( 17 Aug 2022 18:00 )    Color: Light Yellow / Appearance: Clear / S.013 / pH: x  Gluc: x / Ketone: Negative  / Bili: Negative / Urobili: <2 mg/dL   Blood: x / Protein: Negative / Nitrite: Negative   Leuk Esterase: Negative / RBC: x / WBC x   Sq Epi: x / Non Sq Epi: x / Bacteria: x        RADIOLOGY & ADDITIONAL TESTS:    Imaging Personally Reviewed:    Consultant(s) Notes Reviewed:      Care Discussed with Consultants/Other Providers:  
    SUBJECTIVE / OVERNIGHT EVENTS:pt seen and examined      MEDICATIONS  (STANDING):  aspirin enteric coated 81 milliGRAM(s) Oral daily  atorvastatin 40 milliGRAM(s) Oral at bedtime  carvedilol 6.25 milliGRAM(s) Oral every 12 hours  dextrose 50% Injectable 25 Gram(s) IV Push once  dextrose 50% Injectable 12.5 Gram(s) IV Push once  dextrose 50% Injectable 25 Gram(s) IV Push once  dextrose Oral Gel 15 Gram(s) Oral once  enoxaparin Injectable 40 milliGRAM(s) SubCutaneous every 24 hours  glucagon  Injectable 1 milliGRAM(s) IntraMuscular once  insulin lispro (ADMELOG) corrective regimen sliding scale   SubCutaneous three times a day before meals  insulin lispro (ADMELOG) corrective regimen sliding scale   SubCutaneous at bedtime  lisinopril 20 milliGRAM(s) Oral daily  tamsulosin 0.4 milliGRAM(s) Oral at bedtime    MEDICATIONS  (PRN):  acetaminophen     Tablet .. 650 milliGRAM(s) Oral every 6 hours PRN Temp greater or equal to 38C (100.4F), Mild Pain (1 - 3), Moderate Pain (4 - 6)    Vital Signs Last 24 Hrs  T(C): 36.3 (22 @ 17:15), Max: 36.9 (22 @ 11:29)  T(F): 97.4 (22 @ 17:15), Max: 98.5 (22 @ 11:29)  HR: 88 (22 @ 17:15) (66 - 92)  BP: 160/64 (22 @ 17:15) (130/61 - 160/64)  BP(mean): --  RR: 18 (22 @ 17:15) (18 - 19)  SpO2: 100% (22 @ 17:15) (99% - 100%)            PHYSICAL EXAM:  GENERAL: NAD  EYES: EOMI, PERRLA  NECK: Supple, No JVD  CHEST/LUNG: dec breath sounds at bases  HEART:  S1 , S2 +  ABDOMEN: soft, bs+  EXTREMITIES:trace edema+    NEUROLOGY:alert awake confused    LABS:      137  |  103  |  22  ----------------------------<  152<H>  4.0   |  24  |  1.15    Ca    9.4      20 Aug 2022 06:37  Phos  3.1     08-20  Mg     1.90     08-20      Creatinine Trend: 1.15 <--, 1.12 <--, 1.19 <--, 1.32 <--, 1.22 <--                        13.4   8.08  )-----------( 163      ( 20 Aug 2022 06:37 )             40.5     Urine Studies:  Urinalysis Basic - ( 17 Aug 2022 18:00 )    Color: Light Yellow / Appearance: Clear / S.013 / pH:   Gluc:  / Ketone: Negative  / Bili: Negative / Urobili: <2 mg/dL   Blood:  / Protein: Negative / Nitrite: Negative   Leuk Esterase: Negative / RBC:  / WBC    Sq Epi:  / Non Sq Epi:  / Bacteria:                                     RADIOLOGY & ADDITIONAL TESTS:    Imaging Personally Reviewed:    Consultant(s) Notes Reviewed:      Care Discussed with Consultants/Other Providers:  
    SUBJECTIVE / OVERNIGHT EVENTS:pt seen and examined, confused , not following commands    MEDICATIONS  (STANDING):  aspirin enteric coated 81 milliGRAM(s) Oral daily  atorvastatin 40 milliGRAM(s) Oral at bedtime  carvedilol 25 milliGRAM(s) Oral every 12 hours  dextrose 50% Injectable 25 Gram(s) IV Push once  dextrose 50% Injectable 12.5 Gram(s) IV Push once  dextrose 50% Injectable 25 Gram(s) IV Push once  dextrose Oral Gel 15 Gram(s) Oral once  enoxaparin Injectable 40 milliGRAM(s) SubCutaneous every 24 hours  glucagon  Injectable 1 milliGRAM(s) IntraMuscular once  insulin lispro (ADMELOG) corrective regimen sliding scale   SubCutaneous three times a day before meals  insulin lispro (ADMELOG) corrective regimen sliding scale   SubCutaneous at bedtime  lisinopril 20 milliGRAM(s) Oral daily  tamsulosin 0.4 milliGRAM(s) Oral at bedtime    MEDICATIONS  (PRN):  acetaminophen     Tablet .. 650 milliGRAM(s) Oral every 6 hours PRN Temp greater or equal to 38C (100.4F), Mild Pain (1 - 3), Moderate Pain (4 - 6)    Vital Signs Last 24 Hrs  T(C): 36.8 (08-23-22 @ 13:50), Max: 36.8 (08-22-22 @ 22:04)  T(F): 98.3 (08-23-22 @ 13:50), Max: 98.3 (08-23-22 @ 13:50)  HR: 82 (08-23-22 @ 17:50) (76 - 91)  BP: 165/78 (08-23-22 @ 17:50) (132/65 - 166/83)  BP(mean): --  RR: 18 (08-23-22 @ 17:50) (17 - 18)  SpO2: 100% (08-23-22 @ 17:50) (99% - 100%)        PHYSICAL EXAM:  GENERAL: NAD  EYES: EOMI, PERRLA  NECK: Supple, No JVD  CHEST/LUNG: dec breath sounds at bases  HEART:  S1 , S2 +  ABDOMEN: soft , bs+  EXTREMITIES:  no edema  NEUROLOGY:alert awake confused      LABS:                        13.4   11.24 )-----------( 189      ( 23 Aug 2022 06:40 )             41.2     08-23    137  |  105  |  17  ----------------------------<  201<H>  4.6   |  23  |  1.15    Ca    9.6      23 Aug 2022 06:40  Phos  3.0     08-23  Mg     2.10     08-23                RADIOLOGY & ADDITIONAL TESTS:    Imaging Personally Reviewed:    Consultant(s) Notes Reviewed:      Care Discussed with Consultants/Other Providers:  
SID PINTO  72y  Male      Patient is a 72y old  Male who presents with a chief complaint of AMS (22 Aug 2022 12:51)  More alert,awke,answereing questions    REVIEW OF SYSTEMS:  CONSTITUTIONAL: No fever  RESPIRATORY: No cough, hemoptysis or shortness of breath  CARDIOVASCULAR: No chest pain, palpitations, dizziness, or leg swelling  GASTROINTESTINAL: No abdominal pain. nausea, vomiting, hematemesis  GENITOURINARY: No dysuria, frequency, hematuria   NEUROLOGICAL: No headaches, no dizziness  MUSCULOSKELETAL: No joint pain or swelling;     INTERVAL HPI/OVERNIGHT EVENTS:  T(C): 36.8 (08-22-22 @ 18:17), Max: 36.9 (08-21-22 @ 22:44)  HR: 84 (08-22-22 @ 18:17) (75 - 84)  BP: 163/66 (08-22-22 @ 18:17) (136/81 - 163/66)  RR: 16 (08-22-22 @ 18:17) (16 - 18)  SpO2: 100% (08-22-22 @ 18:17) (98% - 100%)  Wt(kg): --  I&O's Summary    21 Aug 2022 07:01  -  22 Aug 2022 07:00  --------------------------------------------------------  IN: 200 mL / OUT: 0 mL / NET: 200 mL      T(C): 36.8 (08-22-22 @ 18:17), Max: 36.9 (08-21-22 @ 22:44)  HR: 84 (08-22-22 @ 18:17) (75 - 84)  BP: 163/66 (08-22-22 @ 18:17) (136/81 - 163/66)  RR: 16 (08-22-22 @ 18:17) (16 - 18)  SpO2: 100% (08-22-22 @ 18:17) (98% - 100%)  Wt(kg): --Vital Signs Last 24 Hrs  T(C): 36.8 (22 Aug 2022 18:17), Max: 36.9 (21 Aug 2022 22:44)  T(F): 98.3 (22 Aug 2022 18:17), Max: 98.5 (21 Aug 2022 22:44)  HR: 84 (22 Aug 2022 18:17) (75 - 84)  BP: 163/66 (22 Aug 2022 18:17) (136/81 - 163/66)  BP(mean): --  RR: 16 (22 Aug 2022 18:17) (16 - 18)  SpO2: 100% (22 Aug 2022 18:17) (98% - 100%)    Parameters below as of 22 Aug 2022 18:17  Patient On (Oxygen Delivery Method): room air        LABS:                        13.5   8.09  )-----------( 159      ( 21 Aug 2022 05:52 )             41.2     08-21    135  |  105  |  20  ----------------------------<  180<H>  4.3   |  22  |  1.11    Ca    9.1      21 Aug 2022 05:52  Phos  3.0     08-21  Mg     2.10     08-21          CAPILLARY BLOOD GLUCOSE      POCT Blood Glucose.: 180 mg/dL (22 Aug 2022 21:28)  POCT Blood Glucose.: 129 mg/dL (22 Aug 2022 17:25)  POCT Blood Glucose.: 202 mg/dL (22 Aug 2022 12:11)  POCT Blood Glucose.: 159 mg/dL (22 Aug 2022 08:32)            PAST MEDICAL & SURGICAL HISTORY:  Type 2 diabetes mellitus      CVA (cerebrovascular accident)      HTN (hypertension)      History of BPH      No significant past surgical history          MEDICATIONS  (STANDING):  aspirin enteric coated 81 milliGRAM(s) Oral daily  atorvastatin 40 milliGRAM(s) Oral at bedtime  carvedilol 25 milliGRAM(s) Oral every 12 hours  dextrose 50% Injectable 25 Gram(s) IV Push once  dextrose 50% Injectable 12.5 Gram(s) IV Push once  dextrose 50% Injectable 25 Gram(s) IV Push once  dextrose Oral Gel 15 Gram(s) Oral once  enoxaparin Injectable 40 milliGRAM(s) SubCutaneous every 24 hours  glucagon  Injectable 1 milliGRAM(s) IntraMuscular once  insulin lispro (ADMELOG) corrective regimen sliding scale   SubCutaneous three times a day before meals  insulin lispro (ADMELOG) corrective regimen sliding scale   SubCutaneous at bedtime  lisinopril 20 milliGRAM(s) Oral daily  tamsulosin 0.4 milliGRAM(s) Oral at bedtime    MEDICATIONS  (PRN):  acetaminophen     Tablet .. 650 milliGRAM(s) Oral every 6 hours PRN Temp greater or equal to 38C (100.4F), Mild Pain (1 - 3), Moderate Pain (4 - 6)        RADIOLOGY & ADDITIONAL TESTS:    Imaging Personally Reviewed:  [ ] YES  [ ] NO    Consultant(s) Notes Reviewed:  [x ] YES  [ ] NO    PHYSICAL EXAM:  GENERAL: Alert and awake lying in bed in no distress  HEAD:  Atraumatic, Normocephalic  EYES: EOMI, SANDRINE, conjunctiva and sclera clear  NECK: Supple, No JVD, Normal thyroid  NERVOUS SYSTEM:  Alert & Oriented X2, Motor and sensory systems are intact,   CHEST/LUNG: Bilateral clear breath sounds, no rhochi, no wheezing, no crepitations,  HEART: Regular rate and rhythm; No murmurs, rubs, or gallops  ABDOMEN: Soft, Nontender, Nondistended; Bowel sounds present  EXTREMITIES:   Peripheral Pulses are palpable, no  edema        Care Discussed with Consultants/Other Providers [ x] YES  [ ] NO      Code Status: [] Full Code [] DNR [] DNI [] Goals of Care:   Disposition: [] ICU [] Stroke Unit [] RCU []PCU []Floor [] Discharge Home         [facility]    
    SUBJECTIVE / OVERNIGHT EVENTS:pt seen and examined      MEDICATIONS  (STANDING):  aspirin enteric coated 81 milliGRAM(s) Oral daily  atorvastatin 40 milliGRAM(s) Oral at bedtime  carvedilol 6.25 milliGRAM(s) Oral every 12 hours  dextrose 50% Injectable 25 Gram(s) IV Push once  dextrose 50% Injectable 12.5 Gram(s) IV Push once  dextrose 50% Injectable 25 Gram(s) IV Push once  dextrose Oral Gel 15 Gram(s) Oral once  enoxaparin Injectable 40 milliGRAM(s) SubCutaneous every 24 hours  glucagon  Injectable 1 milliGRAM(s) IntraMuscular once  insulin lispro (ADMELOG) corrective regimen sliding scale   SubCutaneous three times a day before meals  insulin lispro (ADMELOG) corrective regimen sliding scale   SubCutaneous at bedtime  lisinopril 20 milliGRAM(s) Oral daily  tamsulosin 0.4 milliGRAM(s) Oral at bedtime    MEDICATIONS  (PRN):  acetaminophen     Tablet .. 650 milliGRAM(s) Oral every 6 hours PRN Temp greater or equal to 38C (100.4F), Mild Pain (1 - 3), Moderate Pain (4 - 6)    Vital Signs Last 24 Hrs  T(C): 36.8 (22 @ 20:35), Max: 37.2 (22 @ 05:16)  T(F): 98.2 (22 @ 20:35), Max: 98.9 (22 @ 05:16)  HR: 75 (22 @ 20:35) (70 - 77)  BP: 147/84 (22 @ 20:35) (147/84 - 177/90)  BP(mean): --  RR: 17 (22 @ 20:35) (16 - 17)  SpO2: 98% (22 @ 20:35) (98% - 100%)          PHYSICAL EXAM:  GENERAL: NAD  EYES: EOMI, PERRLA  NECK: Supple, No JVD  CHEST/LUNG: dec breath sounds at bases  HEART:  S1 , S2 +  ABDOMEN: soft, bs+  EXTREMITIES:trace edema+    NEUROLOGY:alert awake confused    LABS:      137  |  106  |  22  ----------------------------<  193<H>  4.1   |  21<L>  |  1.12    Ca    9.0      19 Aug 2022 06:45  Phos  2.9     08-  Mg     2.10     08-      Creatinine Trend: 1.12 <--, 1.19 <--, 1.32 <--, 1.22 <--                        13.3   7.67  )-----------( 157      ( 19 Aug 2022 06:45 )             40.5     Urine Studies:  Urinalysis Basic - ( 17 Aug 2022 18:00 )    Color: Light Yellow / Appearance: Clear / S.013 / pH:   Gluc:  / Ketone: Negative  / Bili: Negative / Urobili: <2 mg/dL   Blood:  / Protein: Negative / Nitrite: Negative   Leuk Esterase: Negative / RBC:  / WBC    Sq Epi:  / Non Sq Epi:  / Bacteria:                     RADIOLOGY & ADDITIONAL TESTS:    Imaging Personally Reviewed:    Consultant(s) Notes Reviewed:      Care Discussed with Consultants/Other Providers:

## 2022-08-26 NOTE — PROGRESS NOTE ADULT - PROBLEM SELECTOR PLAN 6
-Euvolemic  -Cardiology f/u noted
-Unclear HF history. Son states pt gets furosemide as needed for lower leg swelling but is unable to further elaborate.  -Obtain further collateral from family / PCP  -Appears euvolemic at this time  -Hold Lasix, may resume as needed
-Euvolemic  -Cardiology f/u noted

## 2022-08-26 NOTE — PROGRESS NOTE ADULT - PROBLEM SELECTOR PROBLEM 2
2019 novel coronavirus disease (COVID-19)

## 2022-08-26 NOTE — PROGRESS NOTE ADULT - PROBLEM SELECTOR PLAN 5
-c/w home lisinopril and coreg w/ hold parameters

## 2022-08-26 NOTE — PROGRESS NOTE ADULT - PROBLEM SELECTOR PLAN 3
-CT head sig for chronic microvascular changes/chronic lacunar infarcts. Son reports pt had a "stroke" 1-2 years ago. On ASA at home (ascard)  -c/w home aspirin   atorvastatin 40mg QD
-CT head sig for chronic microvascular changes/chronic lacunar infarcts. Son reports pt had a "stroke" 1-2 years ago. On ASA at home (ascard)  -c/w home aspirin  -Start atorvastatin 40mg QD
-CT head sig for chronic microvascular changes/chronic lacunar infarcts. Son reports pt had a "stroke" 1-2 years ago. On ASA at home (ascard)  -c/w home aspirin   atorvastatin 40mg QD

## 2022-08-26 NOTE — PROGRESS NOTE ADULT - PROBLEM SELECTOR PLAN 2
-Management as above

## 2022-08-26 NOTE — PROGRESS NOTE ADULT - PROBLEM SELECTOR PLAN 8
DIET: Soft diet, advance as tolerated  waITINg for TO to [facility]
DIET: Soft diet, advance as tolerated
DIET: Soft diet, advance as tolerated  waITINg for dc to J[facility]IN
DIET: Soft diet, advance as tolerated
DIET: Soft diet, advance as tolerated
DIET: Soft diet, advance as tolerated  waITINg for TO to JAMIN
DIET: Soft diet, advance as tolerated
DIET: Soft diet, advance as tolerated

## 2022-08-26 NOTE — PROGRESS NOTE ADULT - PROBLEM SELECTOR PLAN 4
-Hold home metformin  -Resume ISS  -monitor FS

## 2022-08-27 ENCOUNTER — TRANSCRIPTION ENCOUNTER (OUTPATIENT)
Age: 72
End: 2022-08-27

## 2022-08-27 VITALS
DIASTOLIC BLOOD PRESSURE: 75 MMHG | SYSTOLIC BLOOD PRESSURE: 142 MMHG | TEMPERATURE: 98 F | OXYGEN SATURATION: 96 % | HEART RATE: 64 BPM | RESPIRATION RATE: 18 BRPM

## 2022-08-27 LAB
ANION GAP SERPL CALC-SCNC: 11 MMOL/L — SIGNIFICANT CHANGE UP (ref 7–14)
BUN SERPL-MCNC: 16 MG/DL — SIGNIFICANT CHANGE UP (ref 7–23)
CALCIUM SERPL-MCNC: 9.3 MG/DL — SIGNIFICANT CHANGE UP (ref 8.4–10.5)
CHLORIDE SERPL-SCNC: 103 MMOL/L — SIGNIFICANT CHANGE UP (ref 98–107)
CO2 SERPL-SCNC: 21 MMOL/L — LOW (ref 22–31)
CREAT SERPL-MCNC: 1.08 MG/DL — SIGNIFICANT CHANGE UP (ref 0.5–1.3)
EGFR: 73 ML/MIN/1.73M2 — SIGNIFICANT CHANGE UP
GLUCOSE BLDC GLUCOMTR-MCNC: 138 MG/DL — HIGH (ref 70–99)
GLUCOSE SERPL-MCNC: 143 MG/DL — HIGH (ref 70–99)
MAGNESIUM SERPL-MCNC: 2.2 MG/DL — SIGNIFICANT CHANGE UP (ref 1.6–2.6)
PHOSPHATE SERPL-MCNC: 3.4 MG/DL — SIGNIFICANT CHANGE UP (ref 2.5–4.5)
POTASSIUM SERPL-MCNC: 4.3 MMOL/L — SIGNIFICANT CHANGE UP (ref 3.5–5.3)
POTASSIUM SERPL-SCNC: 4.3 MMOL/L — SIGNIFICANT CHANGE UP (ref 3.5–5.3)
SODIUM SERPL-SCNC: 135 MMOL/L — SIGNIFICANT CHANGE UP (ref 135–145)

## 2022-08-27 RX ORDER — ATORVASTATIN CALCIUM 80 MG/1
1 TABLET, FILM COATED ORAL
Qty: 0 | Refills: 0 | DISCHARGE
Start: 2022-08-27

## 2022-08-27 RX ORDER — ASPIRIN/CALCIUM CARB/MAGNESIUM 324 MG
1 TABLET ORAL
Qty: 0 | Refills: 0 | DISCHARGE
Start: 2022-08-27

## 2022-08-27 RX ADMIN — LISINOPRIL 20 MILLIGRAM(S): 2.5 TABLET ORAL at 05:21

## 2022-08-27 RX ADMIN — CARVEDILOL PHOSPHATE 25 MILLIGRAM(S): 80 CAPSULE, EXTENDED RELEASE ORAL at 05:21

## 2022-08-27 NOTE — DISCHARGE NOTE NURSING/CASE MANAGEMENT/SOCIAL WORK - NSDCPEFALRISK_GEN_ALL_CORE
For information on Fall & Injury Prevention, visit: [email or web address] OR  [email or web address] OR  https://www.cdc.gov/steadi/patient.html

## 2022-08-27 NOTE — DISCHARGE NOTE NURSING/CASE MANAGEMENT/SOCIAL WORK - PATIENT PORTAL LINK FT
You can access the FollowMyHealth Patient Portal offered by [facility] by registering at the following website: [email or web address]. By joining [facility]’s FollowMyHealth portal, you will also be able to view your health information using other applications (apps) compatible with our system.

## 2023-01-07 NOTE — ED PROVIDER NOTE - WR ORDER ID 1
Tele monitoring, echo / recs as per Cardiology team following pt; general observation care / monitoring.
[id number]

## 2023-01-13 ENCOUNTER — APPOINTMENT (OUTPATIENT)
Dept: UROLOGY | Facility: CLINIC | Age: 73
End: 2023-01-13

## 2023-05-23 NOTE — ED ADULT NURSE NOTE - NSICDXNOFAMILYHX_GEN_ALL_CORE
Good nutrition is important when healing from an illness, injury, or surgery. Follow any nutrition recommendations given to you during your hospital stay. If you were given an oral nutrition supplement while in the hospital, continue to take this supplement at home. You can take it with meals, in-between meals, and/or before bedtime. These supplements can be purchased at most local grocery stores, pharmacies, and chain [employer]-stores. If you have any questions about your diet or nutrition, call the hospital and ask for the dietitian.
<-- Click to add NO pertinent Family History

## 2023-08-14 NOTE — PROGRESS NOTE ADULT - NS ATTEND BILL GEN_ALL_CORE
- Was on DAPT: ASA, Plavix  - On hold due to no enteral access  - EKG with AF, negative for STEMI  - Troponin elevated at baseline  
PA/NP to bill
Attending to bill

## 2023-09-11 ENCOUNTER — APPOINTMENT (OUTPATIENT)
Dept: OTOLARYNGOLOGY | Facility: CLINIC | Age: 73
End: 2023-09-11

## 2023-09-20 ENCOUNTER — INPATIENT (INPATIENT)
Facility: HOSPITAL | Age: 73
LOS: 6 days | Discharge: HOME HEALTH | End: 2023-09-27
Attending: INTERNAL MEDICINE | Admitting: INTERNAL MEDICINE
Payer: MEDICARE

## 2023-09-20 VITALS
SYSTOLIC BLOOD PRESSURE: 195 MMHG | OXYGEN SATURATION: 98 % | TEMPERATURE: 98 F | HEART RATE: 88 BPM | RESPIRATION RATE: 16 BRPM | DIASTOLIC BLOOD PRESSURE: 117 MMHG

## 2023-09-20 DIAGNOSIS — E11.9 TYPE 2 DIABETES MELLITUS WITHOUT COMPLICATIONS: ICD-10-CM

## 2023-09-20 DIAGNOSIS — I10 ESSENTIAL (PRIMARY) HYPERTENSION: ICD-10-CM

## 2023-09-20 DIAGNOSIS — R53.1 WEAKNESS: ICD-10-CM

## 2023-09-20 DIAGNOSIS — R11.2 NAUSEA WITH VOMITING, UNSPECIFIED: ICD-10-CM

## 2023-09-20 DIAGNOSIS — Z29.9 ENCOUNTER FOR PROPHYLACTIC MEASURES, UNSPECIFIED: ICD-10-CM

## 2023-09-20 LAB
ALBUMIN SERPL ELPH-MCNC: 4.3 G/DL — SIGNIFICANT CHANGE UP (ref 3.3–5)
ALP SERPL-CCNC: 97 U/L — SIGNIFICANT CHANGE UP (ref 40–120)
ALT FLD-CCNC: 15 U/L — SIGNIFICANT CHANGE UP (ref 4–41)
ANION GAP SERPL CALC-SCNC: 14 MMOL/L — SIGNIFICANT CHANGE UP (ref 7–14)
ANION GAP SERPL CALC-SCNC: 17 MMOL/L — HIGH (ref 7–14)
APPEARANCE UR: CLEAR — SIGNIFICANT CHANGE UP
APTT BLD: 28.7 SEC — SIGNIFICANT CHANGE UP (ref 24.5–35.6)
AST SERPL-CCNC: 22 U/L — SIGNIFICANT CHANGE UP (ref 4–40)
BACTERIA # UR AUTO: NEGATIVE /HPF — SIGNIFICANT CHANGE UP
BASE EXCESS BLDV CALC-SCNC: -1.4 MMOL/L — SIGNIFICANT CHANGE UP (ref -2–3)
BASE EXCESS BLDV CALC-SCNC: -3.7 MMOL/L — LOW (ref -2–3)
BASOPHILS # BLD AUTO: 0.05 K/UL — SIGNIFICANT CHANGE UP (ref 0–0.2)
BASOPHILS NFR BLD AUTO: 0.3 % — SIGNIFICANT CHANGE UP (ref 0–2)
BILIRUB SERPL-MCNC: 0.6 MG/DL — SIGNIFICANT CHANGE UP (ref 0.2–1.2)
BILIRUB UR-MCNC: NEGATIVE — SIGNIFICANT CHANGE UP
BLOOD GAS VENOUS COMPREHENSIVE RESULT: SIGNIFICANT CHANGE UP
BLOOD GAS VENOUS COMPREHENSIVE RESULT: SIGNIFICANT CHANGE UP
BUN SERPL-MCNC: 18 MG/DL — SIGNIFICANT CHANGE UP (ref 7–23)
BUN SERPL-MCNC: 20 MG/DL — SIGNIFICANT CHANGE UP (ref 7–23)
CALCIUM SERPL-MCNC: 10.1 MG/DL — SIGNIFICANT CHANGE UP (ref 8.4–10.5)
CALCIUM SERPL-MCNC: 9.9 MG/DL — SIGNIFICANT CHANGE UP (ref 8.4–10.5)
CAST: 0 /LPF — SIGNIFICANT CHANGE UP (ref 0–4)
CHLORIDE BLDV-SCNC: 104 MMOL/L — SIGNIFICANT CHANGE UP (ref 96–108)
CHLORIDE BLDV-SCNC: 106 MMOL/L — SIGNIFICANT CHANGE UP (ref 96–108)
CHLORIDE SERPL-SCNC: 105 MMOL/L — SIGNIFICANT CHANGE UP (ref 98–107)
CHLORIDE SERPL-SCNC: 106 MMOL/L — SIGNIFICANT CHANGE UP (ref 98–107)
CO2 BLDV-SCNC: 21.8 MMOL/L — LOW (ref 22–26)
CO2 BLDV-SCNC: 27.3 MMOL/L — HIGH (ref 22–26)
CO2 SERPL-SCNC: 19 MMOL/L — LOW (ref 22–31)
CO2 SERPL-SCNC: 19 MMOL/L — LOW (ref 22–31)
COLOR SPEC: YELLOW — SIGNIFICANT CHANGE UP
CREAT SERPL-MCNC: 0.93 MG/DL — SIGNIFICANT CHANGE UP (ref 0.5–1.3)
CREAT SERPL-MCNC: 1.02 MG/DL — SIGNIFICANT CHANGE UP (ref 0.5–1.3)
DIFF PNL FLD: ABNORMAL
EGFR: 78 ML/MIN/1.73M2 — SIGNIFICANT CHANGE UP
EGFR: 87 ML/MIN/1.73M2 — SIGNIFICANT CHANGE UP
EOSINOPHIL # BLD AUTO: 0.06 K/UL — SIGNIFICANT CHANGE UP (ref 0–0.5)
EOSINOPHIL NFR BLD AUTO: 0.3 % — SIGNIFICANT CHANGE UP (ref 0–6)
FLUAV AG NPH QL: SIGNIFICANT CHANGE UP
FLUBV AG NPH QL: SIGNIFICANT CHANGE UP
GAS PNL BLDV: 132 MMOL/L — LOW (ref 136–145)
GAS PNL BLDV: 134 MMOL/L — LOW (ref 136–145)
GAS PNL BLDV: SIGNIFICANT CHANGE UP
GLUCOSE BLDC GLUCOMTR-MCNC: 156 MG/DL — HIGH (ref 70–99)
GLUCOSE BLDC GLUCOMTR-MCNC: 158 MG/DL — HIGH (ref 70–99)
GLUCOSE BLDC GLUCOMTR-MCNC: 165 MG/DL — HIGH (ref 70–99)
GLUCOSE BLDC GLUCOMTR-MCNC: 170 MG/DL — HIGH (ref 70–99)
GLUCOSE BLDV-MCNC: 175 MG/DL — HIGH (ref 70–99)
GLUCOSE BLDV-MCNC: 201 MG/DL — HIGH (ref 70–99)
GLUCOSE SERPL-MCNC: 178 MG/DL — HIGH (ref 70–99)
GLUCOSE SERPL-MCNC: 192 MG/DL — HIGH (ref 70–99)
GLUCOSE UR QL: >=1000 MG/DL
HCO3 BLDV-SCNC: 21 MMOL/L — LOW (ref 22–29)
HCO3 BLDV-SCNC: 26 MMOL/L — SIGNIFICANT CHANGE UP (ref 22–29)
HCT VFR BLD CALC: 45.7 % — SIGNIFICANT CHANGE UP (ref 39–50)
HCT VFR BLD CALC: 48.2 % — SIGNIFICANT CHANGE UP (ref 39–50)
HCT VFR BLDA CALC: 45 % — SIGNIFICANT CHANGE UP (ref 39–51)
HCT VFR BLDA CALC: 47 % — SIGNIFICANT CHANGE UP (ref 39–51)
HEMOLYSIS INDEX: 12 — SIGNIFICANT CHANGE UP
HGB BLD CALC-MCNC: 15 G/DL — SIGNIFICANT CHANGE UP (ref 12.6–17.4)
HGB BLD CALC-MCNC: 15.5 G/DL — SIGNIFICANT CHANGE UP (ref 12.6–17.4)
HGB BLD-MCNC: 15.1 G/DL — SIGNIFICANT CHANGE UP (ref 13–17)
HGB BLD-MCNC: 15.7 G/DL — SIGNIFICANT CHANGE UP (ref 13–17)
IANC: 17.4 K/UL — HIGH (ref 1.8–7.4)
IMM GRANULOCYTES NFR BLD AUTO: 0.5 % — SIGNIFICANT CHANGE UP (ref 0–0.9)
INR BLD: 1.09 RATIO — SIGNIFICANT CHANGE UP (ref 0.85–1.18)
KETONES UR-MCNC: 15 MG/DL
LACTATE BLDV-MCNC: 1.9 MMOL/L — SIGNIFICANT CHANGE UP (ref 0.5–2)
LACTATE BLDV-MCNC: 2.4 MMOL/L — HIGH (ref 0.5–2)
LEUKOCYTE ESTERASE UR-ACNC: NEGATIVE — SIGNIFICANT CHANGE UP
LYMPHOCYTES # BLD AUTO: 0.97 K/UL — LOW (ref 1–3.3)
LYMPHOCYTES # BLD AUTO: 4.9 % — LOW (ref 13–44)
MAGNESIUM SERPL-MCNC: 2.1 MG/DL — SIGNIFICANT CHANGE UP (ref 1.6–2.6)
MCHC RBC-ENTMCNC: 28.1 PG — SIGNIFICANT CHANGE UP (ref 27–34)
MCHC RBC-ENTMCNC: 28.2 PG — SIGNIFICANT CHANGE UP (ref 27–34)
MCHC RBC-ENTMCNC: 32.6 GM/DL — SIGNIFICANT CHANGE UP (ref 32–36)
MCHC RBC-ENTMCNC: 33 GM/DL — SIGNIFICANT CHANGE UP (ref 32–36)
MCV RBC AUTO: 85.4 FL — SIGNIFICANT CHANGE UP (ref 80–100)
MCV RBC AUTO: 86.2 FL — SIGNIFICANT CHANGE UP (ref 80–100)
MONOCYTES # BLD AUTO: 1.04 K/UL — HIGH (ref 0–0.9)
MONOCYTES NFR BLD AUTO: 5.3 % — SIGNIFICANT CHANGE UP (ref 2–14)
NEUTROPHILS # BLD AUTO: 17.4 K/UL — HIGH (ref 1.8–7.4)
NEUTROPHILS NFR BLD AUTO: 88.7 % — HIGH (ref 43–77)
NITRITE UR-MCNC: NEGATIVE — SIGNIFICANT CHANGE UP
NRBC # BLD: 0 /100 WBCS — SIGNIFICANT CHANGE UP (ref 0–0)
NRBC # BLD: 0 /100 WBCS — SIGNIFICANT CHANGE UP (ref 0–0)
NRBC # FLD: 0 K/UL — SIGNIFICANT CHANGE UP (ref 0–0)
NRBC # FLD: 0 K/UL — SIGNIFICANT CHANGE UP (ref 0–0)
PCO2 BLDV: 35 MMHG — LOW (ref 42–55)
PCO2 BLDV: 51 MMHG — SIGNIFICANT CHANGE UP (ref 42–55)
PH BLDV: 7.31 — LOW (ref 7.32–7.43)
PH BLDV: 7.38 — SIGNIFICANT CHANGE UP (ref 7.32–7.43)
PH UR: 6 — SIGNIFICANT CHANGE UP (ref 5–8)
PHOSPHATE SERPL-MCNC: 2.8 MG/DL — SIGNIFICANT CHANGE UP (ref 2.5–4.5)
PLATELET # BLD AUTO: 176 K/UL — SIGNIFICANT CHANGE UP (ref 150–400)
PLATELET # BLD AUTO: 181 K/UL — SIGNIFICANT CHANGE UP (ref 150–400)
PO2 BLDV: 29 MMHG — SIGNIFICANT CHANGE UP (ref 25–45)
PO2 BLDV: 76 MMHG — HIGH (ref 25–45)
POTASSIUM BLDV-SCNC: 5 MMOL/L — SIGNIFICANT CHANGE UP (ref 3.5–5.1)
POTASSIUM BLDV-SCNC: 7 MMOL/L — CRITICAL HIGH (ref 3.5–5.1)
POTASSIUM SERPL-MCNC: 4.1 MMOL/L — SIGNIFICANT CHANGE UP (ref 3.5–5.3)
POTASSIUM SERPL-MCNC: 4.1 MMOL/L — SIGNIFICANT CHANGE UP (ref 3.5–5.3)
POTASSIUM SERPL-MCNC: 5.5 MMOL/L — HIGH (ref 3.5–5.3)
POTASSIUM SERPL-SCNC: 4.1 MMOL/L — SIGNIFICANT CHANGE UP (ref 3.5–5.3)
POTASSIUM SERPL-SCNC: 4.1 MMOL/L — SIGNIFICANT CHANGE UP (ref 3.5–5.3)
POTASSIUM SERPL-SCNC: 5.5 MMOL/L — HIGH (ref 3.5–5.3)
PROCALCITONIN SERPL-MCNC: 0.47 NG/ML — HIGH (ref 0.02–0.1)
PROT SERPL-MCNC: 8.1 G/DL — SIGNIFICANT CHANGE UP (ref 6–8.3)
PROT UR-MCNC: 100 MG/DL
PROTHROM AB SERPL-ACNC: 12.2 SEC — SIGNIFICANT CHANGE UP (ref 9.5–13)
RBC # BLD: 5.35 M/UL — SIGNIFICANT CHANGE UP (ref 4.2–5.8)
RBC # BLD: 5.59 M/UL — SIGNIFICANT CHANGE UP (ref 4.2–5.8)
RBC # FLD: 13.2 % — SIGNIFICANT CHANGE UP (ref 10.3–14.5)
RBC # FLD: 13.2 % — SIGNIFICANT CHANGE UP (ref 10.3–14.5)
RBC CASTS # UR COMP ASSIST: 0 /HPF — SIGNIFICANT CHANGE UP (ref 0–4)
RSV RNA NPH QL NAA+NON-PROBE: SIGNIFICANT CHANGE UP
SAO2 % BLDV: 43.6 % — LOW (ref 67–88)
SAO2 % BLDV: 95.2 % — HIGH (ref 67–88)
SARS-COV-2 RNA SPEC QL NAA+PROBE: SIGNIFICANT CHANGE UP
SODIUM SERPL-SCNC: 138 MMOL/L — SIGNIFICANT CHANGE UP (ref 135–145)
SODIUM SERPL-SCNC: 142 MMOL/L — SIGNIFICANT CHANGE UP (ref 135–145)
SP GR SPEC: 1.03 — SIGNIFICANT CHANGE UP (ref 1–1.03)
SQUAMOUS # UR AUTO: 0 /HPF — SIGNIFICANT CHANGE UP (ref 0–5)
UROBILINOGEN FLD QL: 0.2 MG/DL — SIGNIFICANT CHANGE UP (ref 0.2–1)
WBC # BLD: 19.62 K/UL — HIGH (ref 3.8–10.5)
WBC # BLD: 20.24 K/UL — HIGH (ref 3.8–10.5)
WBC # FLD AUTO: 19.62 K/UL — HIGH (ref 3.8–10.5)
WBC # FLD AUTO: 20.24 K/UL — HIGH (ref 3.8–10.5)
WBC UR QL: 0 /HPF — SIGNIFICANT CHANGE UP (ref 0–5)

## 2023-09-20 PROCEDURE — 99223 1ST HOSP IP/OBS HIGH 75: CPT

## 2023-09-20 PROCEDURE — 99285 EMERGENCY DEPT VISIT HI MDM: CPT | Mod: 25

## 2023-09-20 PROCEDURE — 70450 CT HEAD/BRAIN W/O DYE: CPT | Mod: 26,MA

## 2023-09-20 PROCEDURE — 99222 1ST HOSP IP/OBS MODERATE 55: CPT

## 2023-09-20 PROCEDURE — 71045 X-RAY EXAM CHEST 1 VIEW: CPT | Mod: 26

## 2023-09-20 RX ORDER — CARVEDILOL PHOSPHATE 80 MG/1
6.25 CAPSULE, EXTENDED RELEASE ORAL EVERY 12 HOURS
Refills: 0 | Status: DISCONTINUED | OUTPATIENT
Start: 2023-09-20 | End: 2023-09-26

## 2023-09-20 RX ORDER — GLUCAGON INJECTION, SOLUTION 0.5 MG/.1ML
1 INJECTION, SOLUTION SUBCUTANEOUS ONCE
Refills: 0 | Status: DISCONTINUED | OUTPATIENT
Start: 2023-09-20 | End: 2023-09-27

## 2023-09-20 RX ORDER — INSULIN LISPRO 100/ML
VIAL (ML) SUBCUTANEOUS AT BEDTIME
Refills: 0 | Status: DISCONTINUED | OUTPATIENT
Start: 2023-09-20 | End: 2023-09-27

## 2023-09-20 RX ORDER — CHLORHEXIDINE GLUCONATE 213 G/1000ML
1 SOLUTION TOPICAL
Refills: 0 | Status: DISCONTINUED | OUTPATIENT
Start: 2023-09-20 | End: 2023-09-27

## 2023-09-20 RX ORDER — ASPIRIN/CALCIUM CARB/MAGNESIUM 324 MG
81 TABLET ORAL DAILY
Refills: 0 | Status: DISCONTINUED | OUTPATIENT
Start: 2023-09-20 | End: 2023-09-24

## 2023-09-20 RX ORDER — SODIUM CHLORIDE 9 MG/ML
1000 INJECTION, SOLUTION INTRAVENOUS
Refills: 0 | Status: DISCONTINUED | OUTPATIENT
Start: 2023-09-20 | End: 2023-09-22

## 2023-09-20 RX ORDER — DEXTROSE 50 % IN WATER 50 %
25 SYRINGE (ML) INTRAVENOUS ONCE
Refills: 0 | Status: DISCONTINUED | OUTPATIENT
Start: 2023-09-20 | End: 2023-09-27

## 2023-09-20 RX ORDER — DEXTROSE 50 % IN WATER 50 %
12.5 SYRINGE (ML) INTRAVENOUS ONCE
Refills: 0 | Status: DISCONTINUED | OUTPATIENT
Start: 2023-09-20 | End: 2023-09-27

## 2023-09-20 RX ORDER — TAMSULOSIN HYDROCHLORIDE 0.4 MG/1
0.4 CAPSULE ORAL AT BEDTIME
Refills: 0 | Status: DISCONTINUED | OUTPATIENT
Start: 2023-09-20 | End: 2023-09-27

## 2023-09-20 RX ORDER — ATORVASTATIN CALCIUM 80 MG/1
40 TABLET, FILM COATED ORAL AT BEDTIME
Refills: 0 | Status: DISCONTINUED | OUTPATIENT
Start: 2023-09-20 | End: 2023-09-27

## 2023-09-20 RX ORDER — SODIUM CHLORIDE 9 MG/ML
1000 INJECTION, SOLUTION INTRAVENOUS
Refills: 0 | Status: DISCONTINUED | OUTPATIENT
Start: 2023-09-20 | End: 2023-09-27

## 2023-09-20 RX ORDER — INSULIN LISPRO 100/ML
VIAL (ML) SUBCUTANEOUS
Refills: 0 | Status: DISCONTINUED | OUTPATIENT
Start: 2023-09-20 | End: 2023-09-27

## 2023-09-20 RX ORDER — LISINOPRIL 2.5 MG/1
20 TABLET ORAL DAILY
Refills: 0 | Status: DISCONTINUED | OUTPATIENT
Start: 2023-09-20 | End: 2023-09-24

## 2023-09-20 RX ORDER — HEPARIN SODIUM 5000 [USP'U]/ML
5000 INJECTION INTRAVENOUS; SUBCUTANEOUS EVERY 12 HOURS
Refills: 0 | Status: DISCONTINUED | OUTPATIENT
Start: 2023-09-20 | End: 2023-09-27

## 2023-09-20 RX ORDER — ACETAMINOPHEN 500 MG
650 TABLET ORAL EVERY 6 HOURS
Refills: 0 | Status: DISCONTINUED | OUTPATIENT
Start: 2023-09-20 | End: 2023-09-27

## 2023-09-20 RX ORDER — DEXTROSE 50 % IN WATER 50 %
15 SYRINGE (ML) INTRAVENOUS ONCE
Refills: 0 | Status: DISCONTINUED | OUTPATIENT
Start: 2023-09-20 | End: 2023-09-27

## 2023-09-20 RX ORDER — INFLUENZA VIRUS VACCINE 15; 15; 15; 15 UG/.5ML; UG/.5ML; UG/.5ML; UG/.5ML
0.7 SUSPENSION INTRAMUSCULAR ONCE
Refills: 0 | Status: DISCONTINUED | OUTPATIENT
Start: 2023-09-20 | End: 2023-09-27

## 2023-09-20 RX ADMIN — SODIUM CHLORIDE 50 MILLILITER(S): 9 INJECTION, SOLUTION INTRAVENOUS at 13:02

## 2023-09-20 RX ADMIN — Medication 1: at 12:13

## 2023-09-20 RX ADMIN — HEPARIN SODIUM 5000 UNIT(S): 5000 INJECTION INTRAVENOUS; SUBCUTANEOUS at 17:47

## 2023-09-20 RX ADMIN — TAMSULOSIN HYDROCHLORIDE 0.4 MILLIGRAM(S): 0.4 CAPSULE ORAL at 21:35

## 2023-09-20 RX ADMIN — CARVEDILOL PHOSPHATE 6.25 MILLIGRAM(S): 80 CAPSULE, EXTENDED RELEASE ORAL at 17:48

## 2023-09-20 RX ADMIN — Medication 81 MILLIGRAM(S): at 12:13

## 2023-09-20 RX ADMIN — Medication 1: at 17:47

## 2023-09-20 RX ADMIN — ATORVASTATIN CALCIUM 40 MILLIGRAM(S): 80 TABLET, FILM COATED ORAL at 21:35

## 2023-09-20 NOTE — ED PROVIDER NOTE - CCCP TRG CHIEF CMPLNT
Problem: Falls - Risk of:  Goal: Will remain free from falls  Description  Will remain free from falls  12/3/2019 2327 by Kian Nguyen RN  Outcome: Ongoing  Note:   Call light in reach, bed in lowest position, and bed alarm activated. Education given on use of call light before ambulation and when in need of assistance. Patient expressed understanding. Hourly visual checks performed and charted. Toileting offered to patient. No falls this shift, at any time. Arm band and falling star in place. Will continue to monitor. Problem: Risk for Impaired Skin Integrity  Goal: Tissue integrity - skin and mucous membranes  Description  Structural intactness and normal physiological function of skin and  mucous membranes. 12/3/2019 2327 by Kian Nguyen RN  Outcome: Ongoing  Note:   No signs of new skin breakdown. Patient has severe excoriation of perineal area. Skin warm, dry, and intact. Mucous membranes pink and moist.  Assistance with turns/ambulation provided PRN. Will continue to monitor. Problem: Nutrition  Goal: Optimal nutrition therapy  12/3/2019 2327 by Kian Nguyen RN  Outcome: Ongoing  Note:   Patient consuming approximately 50% of meals with a 2000 ml fluid restriction. Problem: Bowel Function - Altered:  Goal: Bowel elimination is within specified parameters  Description  Bowel elimination is within specified parameters  12/3/2019 2327 by Kian Nguyen RN  Outcome: Ongoing  Note:   Patient continues to have loose stools this shift. Problem: Bowel Function - Altered:  Goal: Bowel elimination is within specified parameters  Description  Bowel elimination is within specified parameters  12/3/2019 2327 by Kian Nguyen RN  Outcome: Ongoing  Note:   Patient continues to have loose stools this shift. Care plan reviewed with patient. Patient verbalizes understanding of the plan of care and contributed to goal setting. weakness

## 2023-09-20 NOTE — ED PROVIDER NOTE - OBJECTIVE STATEMENT
72 yo male presenting due to weakness and vomiting that started at 8:30pm today. The patients son endorses that he was fine up until he was eating dinner at 8:30 today when he began vomiting and saying he was feeling weak. When EMS picked him up, they had to assist him to the ambulance. The son says that he usually walks around with a can and sometimes needs assistance walking. Denies hematemesis, fevers, chills, abdominal pain, shortness of breath, recent sick contact, facial droop, slurred speech.

## 2023-09-20 NOTE — ED PROVIDER NOTE - PROGRESS NOTE DETAILS
WBC was elevated with no clear signs of infection from the CXR or the urinalysis. Will order CT head .  Humberto Finley MD (PGY 1) I spoke with Dr. Kaur who agreed to admit the patient.  Humberto Finley MD (PGY 1)

## 2023-09-20 NOTE — H&P ADULT - HISTORY OF PRESENT ILLNESS
72 yo M with DM, HTN, CVA (apx [id number]) w/o residual deficits presented with 1 day of nausea, vomiting along with generalized weakness. Collateral provided by son Severo. Patient in usual state of health until he ate dinner last night (mashed beans), afterward experienced nausea and vomiting (NBNB). No one at home with similar symptoms. No fevers. Sleepy this morning, per son the patient was awake all night and had just fallen asleep. No sick contacts.

## 2023-09-20 NOTE — PATIENT PROFILE ADULT - VISION (WITH CORRECTIVE LENSES IF THE PATIENT USUALLY WEARS THEM):
[de-identified] : 9[birth year]: 41F who presents today with right lower leg pain. patient was playing volleyball when she felt tightness in her gastroc followed by a pop. pain is proximal over the medial gastroc. ambulates w/ a cane. Saw [facility] pa-c and received XR and MRI. No prior injuries to involved leg.  Pt wears glasses to see/Partially impaired: cannot see medication labels or newsprint, but can see obstacles in path, and the surrounding layout; can count fingers at arm's length

## 2023-09-20 NOTE — ED PROVIDER NOTE - CLINICAL SUMMARY MEDICAL DECISION MAKING FREE TEXT BOX
73 year old male with a PMH of HTN, DM, and a CVA 3 years ago presenting due to acute weakness that started at 8:30pm today while eating dinner. The patient vomited while eating and the son endorsed he was feeling weak, so they called an ambulance to take him to the hospital. On presentation, the patient was arousable to voice and could answer questions. No focal neurologic deficits were noted on exam. CBC, CBC, UA, blood gas venous complete, and CXR were  ordered to evaluate for signs of infection. WBC was elevated, but no signs of infection were noted in the UA or CXR. CT head was ordered to evaluate for intercranial pathology. Hyperkalemia was noted on venous blood gas and CMP, however the samples were hemolyzed. Repeat potassium was normal.

## 2023-09-20 NOTE — H&P ADULT - NSHPPHYSICALEXAM_GEN_ALL_CORE
PHYSICAL EXAM:  Vital Signs Last 24 Hrs  T(C): 36.8 (20 Sep 2023 12:20), Max: 36.8 (20 Sep 2023 12:20)  T(F): 98.2 (20 Sep 2023 12:20), Max: 98.2 (20 Sep 2023 12:20)  HR: 95 (20 Sep 2023 12:20) (54 - 95)  BP: 163/96 (20 Sep 2023 12:20) (163/96 - 195/117)  BP(mean): --  RR: 17 (20 Sep 2023 12:20) (16 - 17)  SpO2: 98% (20 Sep 2023 12:20) (98% - 100%)    Parameters below as of 20 Sep 2023 12:20  Patient On (Oxygen Delivery Method): room air    CONSTITUTIONAL: NAD, laying in bed  EYES: conjunctiva and sclera clear  ENMT: dry oral mucosa  NECK: Supple  RESPIRATORY: lungs clear bilaterally, no wheezing  CARDIOVASCULAR: Regular rate and rhythm, normal S1 and S2, no lower extremity edema; Peripheral pulses are 2+ bilaterally  ABDOMEN: Nontender to palpation, normoactive bowel sounds, no rebound/guarding  SKIN: No rashes

## 2023-09-20 NOTE — ED ADULT NURSE NOTE - NSSUHOSCREENINGYN_ED_ALL_ED
Pt discharged via wheelchair with all belongings, scripts and discharge paperwork. Follow up appointments and discharge instructions reviewed with pt and care giver. All question answered to satisfaction. Yes - the patient is able to be screened

## 2023-09-20 NOTE — H&P ADULT - NSICDXNOFAMILYHX_GEN_ALL_CORE
<-- Click to add NO pertinent Family History Improved with NaCl tabs. Improved with NaCl tabs.  Would discontinue NaCl tabs and monitor BMP.

## 2023-09-20 NOTE — ED ADULT TRIAGE NOTE - CHIEF COMPLAINT QUOTE
Patient brought to [facility] from home by SADIE(unit 53 boy). Son states patient couldn't walk up the steps from weakness at 2030.  Fingerstick checked by son 199. Previous medical history: DM, HTN, CVA. PSA elevated, No facial droop, unilateral weakness noted, lower extremity weakness noted. Fingerstick 189 at triage. EKG at triage. Dr. Pham evaluated patient in triage. No stroke code. Patient brought to [facility] from home by ASDIE(unit 53 boy). Son states patient couldn't walk up the steps from weakness at 2030.  Fingerstick checked by son 199. Previous medical history: DM, HTN, CVA. PSA elevated, No facial droop, unilateral weakness noted, lower extremity weakness noted. Fingerstick 189 at triage. EKG at triage. Dr. Paz evaluated patient in triage. No stroke code.

## 2023-09-20 NOTE — ED ADULT NURSE REASSESSMENT NOTE - NS ED NURSE REASSESS COMMENT FT1
patient received from day RN Karli at 1205 at baseline mental status, appears to be resting in bed comfortably, no complaints noted at this time. Breathing even and unlabored, pallor/diaphoresis not noted. Vital signs as charted. IV site clean dry and intact. medication given per orders, patient tolerated well.

## 2023-09-20 NOTE — H&P ADULT - TIME BILLING
Review of laboratory data, radiology results, EMR documentation, discussion with patient/advanced care providers and interdisciplinary staff.

## 2023-09-20 NOTE — ED ADULT NURSE NOTE - CHIEF COMPLAINT QUOTE
Patient brought to [facility] from home by SADIE(unit 53 boy). Son states patient couldn't walk up the steps from weakness at 2030.  Fingerstick checked by son 199. Previous medical history: DM, HTN, CVA. PSA elevated, No facial droop, unilateral weakness noted, lower extremity weakness noted. Fingerstick 189 at triage. EKG at triage. Dr. Paz evaluated patient in triage. No stroke code.

## 2023-09-20 NOTE — H&P ADULT - PROBLEM SELECTOR PLAN 1
Dry mucus membranes, recent n/v- none currently. Benign abd.  - D5 NS 50cc/hr x 10 hrs (cautious with fluids given possible HF hx)  - Encourage PO  - Consider stool testing if has diarrhea or CT imaging if persistent/worsening symptoms

## 2023-09-20 NOTE — H&P ADULT - NSHPLABSRESULTS_GEN_ALL_CORE
LABS:                        15.1   20.24 )-----------( 176      ( 20 Sep 2023 07:30 )             45.7     09-20    142  |  106  |  18  ----------------------------<  178<H>  4.1   |  19<L>  |  0.93    Ca    9.9      20 Sep 2023 07:30  Phos  2.8     09-20  Mg     2.10     09-20    TPro  8.1  /  Alb  4.3  /  TBili  0.6  /  DBili  x   /  AST  22  /  ALT  15  /  AlkPhos  97  09-20    PT/INR - ( 20 Sep 2023 03:04 )   PT: 12.2 sec;   INR: 1.09 ratio      PTT - ( 20 Sep 2023 03:04 )  PTT:28.7 sec    Urinalysis Basic - ( 20 Sep 2023 07:30 )    Color: x / Appearance: x / SG: x / pH: x  Gluc: 178 mg/dL / Ketone: x  / Bili: x / Urobili: x   Blood: x / Protein: x / Nitrite: x   Leuk Esterase: x / RBC: x / WBC x   Sq Epi: x / Non Sq Epi: x / Bacteria: x    RADIOLOGY & ADDITIONAL TESTS: Reviewed    COORDINATION OF CARE:  Care Discussed with Consultants/Other Providers [Y- Medicine ACP, RN]

## 2023-09-20 NOTE — H&P ADULT - ASSESSMENT
74 yo M with DM, HTN, CVA (apx [id number]) w/o residual deficits presented with 1 day of nausea, vomiting along with generalized weakness, admitted for further evaluation.

## 2023-09-20 NOTE — H&P ADULT - NSHPREVIEWOFSYSTEMS_GEN_ALL_CORE
Constitutional: no recent weight changes, fevers, night sweats or fatigue  Dermatologic: no lesions or rashes.  HEENT: no visual changes, hearing changes, rhinitis, odynophagia, or dysphagia  Cardiovascular: denies palpitations, chest pain, edema  Respiratory: denies SOB, wheezing  Gastrointestinal: +N/V, no abdominal pain, hematochezia, melena  : denies dysuria, hematuria  MSK: + general weakness  Endocrine: no cold or heat intolerance or excessive thirst or urination  Hematologic: no excessive bleeding or clotting  Neurologic: no headaches, vision changes, dizziness, fainting or numbness, tingling or weakness

## 2023-09-20 NOTE — ED PROVIDER NOTE - NS ED ROS FT
GENERAL: Weakness No fever or chills  EYES: No change in vision  HEENT: No trouble swallowing or speaking  CARDIAC: No chest pain  PULMONARY: No cough or SOB  GI: Nausea and vomiting, No abdominal pain, no diarrhea or constipation  : No changes in urination  SKIN: No rashes  Otherwise as HPI or negative.

## 2023-09-20 NOTE — ED PROVIDER NOTE - ATTENDING CONTRIBUTION TO CARE
73-year-old male past medical history of hypertension diabetes CAD previous CVA presenting with weakness that started earlier this evening at 8 PM on September 19, 2023.  Per report the patient was at his normal state of health until approximately 8 PM where he started not feeling well.  He did have an episode of nonbilious nonbloody emesis that occurred after he was eating dinner.  The son states that when he came to his house he needed to help him up the steps which is atypical.  There is no fever abdominal pain shortness of breath changes in speech or other complaints.    Vitals: I have reviewed the patients vital signs  General: nontoxic appearing  HEENT: Atraumatic, normocephalic, airway patent  Eyes: EOMI, tracking appropriately  Neck: no tracheal deviation  Chest/Lungs: no trauma, symmetric chest rise, speaking in complete sentences,  no resp distress  Heart: skin and extremities well perfused, regular rate and rhythm  Neuro: Alert, appears non focal  MSK: no deformities  Skin: no cyanosis, no jaundice     73-year-old male past medical history of diabetes CVA hypertension presenting today with generalized weakness and vomiting.  Differential includes but is not limited to sepsis, electro abnormality, anemia, CVA.  CVA considered less likely in the differential as that does not localize.  Patient is also well out of the window for any type of intervention with regards to lytics.  We will get laboratory studies and infectious work-up.  If work-up is negative will consider adding a CT scan to look for other potential etiologies.  Unless the patient has returned to baseline strength will require admission

## 2023-09-20 NOTE — ED PROVIDER NOTE - PHYSICAL EXAMINATION
Humberto Finley MD (PGY1)   Physical Exam:    Gen: NAD, AOx3, non-toxic appearing  Head: NCAT  Lung: CTAB, no respiratory distress, no wheezes/rhonchi/rales B/L  CV: RRR, no murmurs, rubs or gallops  Abd: soft, NT, ND, no guarding, no rigidity, no rebound tenderness, no CVA tenderness   MSK: no visible deformities, ROM normal in UE/LE, no back pain  Neuro: Patient has no facial droop or slurred speech. Patient has full ROM of upper and lower extremities and is able to raise them in the air for 5 seconds.   Skin: Warm, well perfused, no rash, no leg swelling  Psych: normal affect, calm

## 2023-09-20 NOTE — ED ADULT NURSE NOTE - OBJECTIVE STATEMENT
Patient presents to ED A&04 ambulatory with walker at baseline coming in for generalized weakness starting at 8:30 pm a/w 1 episode of vomiting. respirations even and unlabored, appears in NAD. No facial droop or slurred speech noted. Pt has equal strength, motor, and sensation to bilateral upper and lower extremities. No drifts noted to bilateral upper and lower extremities. PMH HTN, HLD. DM. No complaints of chest pain, headache, dizziness, SOB, fever, chills verbalized. 20GLAC in place.

## 2023-09-20 NOTE — PATIENT PROFILE ADULT - FALL HARM RISK - HARM RISK INTERVENTIONS

## 2023-09-20 NOTE — ED ADULT NURSE NOTE - BIRTH SEX
Detail Level: Detailed
Quality 137: Melanoma: Continuity Of Care - Recall System: Patient information entered into a recall system that includes: target date for the next exam specified AND a process to follow up with patients regarding missed or unscheduled appointments
Quality 110: Preventive Care And Screening: Influenza Immunization: Influenza Immunization previously received during influenza season
Male

## 2023-09-21 LAB
A1C WITH ESTIMATED AVERAGE GLUCOSE RESULT: 7.1 % — HIGH (ref 4–5.6)
ANION GAP SERPL CALC-SCNC: 13 MMOL/L — SIGNIFICANT CHANGE UP (ref 7–14)
BUN SERPL-MCNC: 22 MG/DL — SIGNIFICANT CHANGE UP (ref 7–23)
CALCIUM SERPL-MCNC: 10 MG/DL — SIGNIFICANT CHANGE UP (ref 8.4–10.5)
CHLORIDE SERPL-SCNC: 104 MMOL/L — SIGNIFICANT CHANGE UP (ref 98–107)
CO2 SERPL-SCNC: 22 MMOL/L — SIGNIFICANT CHANGE UP (ref 22–31)
CREAT SERPL-MCNC: 1.14 MG/DL — SIGNIFICANT CHANGE UP (ref 0.5–1.3)
CULTURE RESULTS: NO GROWTH — SIGNIFICANT CHANGE UP
EGFR: 68 ML/MIN/1.73M2 — SIGNIFICANT CHANGE UP
ESTIMATED AVERAGE GLUCOSE: 157 — SIGNIFICANT CHANGE UP
GLUCOSE BLDC GLUCOMTR-MCNC: 137 MG/DL — HIGH (ref 70–99)
GLUCOSE BLDC GLUCOMTR-MCNC: 155 MG/DL — HIGH (ref 70–99)
GLUCOSE BLDC GLUCOMTR-MCNC: 170 MG/DL — HIGH (ref 70–99)
GLUCOSE BLDC GLUCOMTR-MCNC: 172 MG/DL — HIGH (ref 70–99)
GLUCOSE BLDC GLUCOMTR-MCNC: 178 MG/DL — HIGH (ref 70–99)
GLUCOSE SERPL-MCNC: 185 MG/DL — HIGH (ref 70–99)
HCT VFR BLD CALC: 46.5 % — SIGNIFICANT CHANGE UP (ref 39–50)
HGB BLD-MCNC: 15.6 G/DL — SIGNIFICANT CHANGE UP (ref 13–17)
MAGNESIUM SERPL-MCNC: 2.2 MG/DL — SIGNIFICANT CHANGE UP (ref 1.6–2.6)
MCHC RBC-ENTMCNC: 28.7 PG — SIGNIFICANT CHANGE UP (ref 27–34)
MCHC RBC-ENTMCNC: 33.5 GM/DL — SIGNIFICANT CHANGE UP (ref 32–36)
MCV RBC AUTO: 85.5 FL — SIGNIFICANT CHANGE UP (ref 80–100)
MRSA PCR RESULT.: SIGNIFICANT CHANGE UP
NRBC # BLD: 0 /100 WBCS — SIGNIFICANT CHANGE UP (ref 0–0)
NRBC # FLD: 0 K/UL — SIGNIFICANT CHANGE UP (ref 0–0)
PHOSPHATE SERPL-MCNC: 2.3 MG/DL — LOW (ref 2.5–4.5)
PLATELET # BLD AUTO: 168 K/UL — SIGNIFICANT CHANGE UP (ref 150–400)
POTASSIUM SERPL-MCNC: 3.7 MMOL/L — SIGNIFICANT CHANGE UP (ref 3.5–5.3)
POTASSIUM SERPL-SCNC: 3.7 MMOL/L — SIGNIFICANT CHANGE UP (ref 3.5–5.3)
RBC # BLD: 5.44 M/UL — SIGNIFICANT CHANGE UP (ref 4.2–5.8)
RBC # FLD: 13.4 % — SIGNIFICANT CHANGE UP (ref 10.3–14.5)
S AUREUS DNA NOSE QL NAA+PROBE: SIGNIFICANT CHANGE UP
SODIUM SERPL-SCNC: 139 MMOL/L — SIGNIFICANT CHANGE UP (ref 135–145)
SPECIMEN SOURCE: SIGNIFICANT CHANGE UP
WBC # BLD: 14.49 K/UL — HIGH (ref 3.8–10.5)
WBC # FLD AUTO: 14.49 K/UL — HIGH (ref 3.8–10.5)

## 2023-09-21 PROCEDURE — 99232 SBSQ HOSP IP/OBS MODERATE 35: CPT

## 2023-09-21 RX ORDER — SODIUM,POTASSIUM PHOSPHATES 278-250MG
1 POWDER IN PACKET (EA) ORAL
Refills: 0 | Status: COMPLETED | OUTPATIENT
Start: 2023-09-21 | End: 2023-09-21

## 2023-09-21 RX ADMIN — CARVEDILOL PHOSPHATE 6.25 MILLIGRAM(S): 80 CAPSULE, EXTENDED RELEASE ORAL at 18:06

## 2023-09-21 RX ADMIN — ATORVASTATIN CALCIUM 40 MILLIGRAM(S): 80 TABLET, FILM COATED ORAL at 22:05

## 2023-09-21 RX ADMIN — HEPARIN SODIUM 5000 UNIT(S): 5000 INJECTION INTRAVENOUS; SUBCUTANEOUS at 07:12

## 2023-09-21 RX ADMIN — TAMSULOSIN HYDROCHLORIDE 0.4 MILLIGRAM(S): 0.4 CAPSULE ORAL at 22:05

## 2023-09-21 RX ADMIN — Medication 81 MILLIGRAM(S): at 12:55

## 2023-09-21 RX ADMIN — CHLORHEXIDINE GLUCONATE 1 APPLICATION(S): 213 SOLUTION TOPICAL at 07:11

## 2023-09-21 RX ADMIN — Medication 1 PACKET(S): at 09:31

## 2023-09-21 RX ADMIN — LISINOPRIL 20 MILLIGRAM(S): 2.5 TABLET ORAL at 07:12

## 2023-09-21 RX ADMIN — CARVEDILOL PHOSPHATE 6.25 MILLIGRAM(S): 80 CAPSULE, EXTENDED RELEASE ORAL at 07:12

## 2023-09-21 RX ADMIN — Medication 1: at 09:31

## 2023-09-21 RX ADMIN — HEPARIN SODIUM 5000 UNIT(S): 5000 INJECTION INTRAVENOUS; SUBCUTANEOUS at 18:05

## 2023-09-21 RX ADMIN — Medication 1: at 18:20

## 2023-09-21 RX ADMIN — Medication 1 PACKET(S): at 18:06

## 2023-09-21 NOTE — PHYSICAL THERAPY INITIAL EVALUATION ADULT - ADDITIONAL COMMENTS
As per case coordinator note, patient has 4 steps to enter and 16 steps inside to negotiate. Family assists with ADLs.

## 2023-09-21 NOTE — PHYSICAL THERAPY INITIAL EVALUATION ADULT - ASSISTIVE DEVICE FOR TRANSFER: GAIT, REHAB EVAL
rolling walker Calcipotriene Pregnancy And Lactation Text: This medication has not been proven safe during pregnancy. It is unknown if this medication is excreted in breast milk.

## 2023-09-21 NOTE — PHYSICAL THERAPY INITIAL EVALUATION ADULT - PERTINENT HX OF CURRENT PROBLEM, REHAB EVAL
73 year old male with DM, HTN, CVA (apx [id number]) w/o residual deficits presented with 1 day of nausea, vomiting along with generalized weakness. Collateral provided by son Severo. Patient in usual state of health until he ate dinner last night (mashed beans), afterward experienced nausea and vomiting (NBNB).

## 2023-09-22 LAB
ANION GAP SERPL CALC-SCNC: 14 MMOL/L — SIGNIFICANT CHANGE UP (ref 7–14)
BUN SERPL-MCNC: 23 MG/DL — SIGNIFICANT CHANGE UP (ref 7–23)
CALCIUM SERPL-MCNC: 9.5 MG/DL — SIGNIFICANT CHANGE UP (ref 8.4–10.5)
CHLORIDE SERPL-SCNC: 103 MMOL/L — SIGNIFICANT CHANGE UP (ref 98–107)
CO2 SERPL-SCNC: 18 MMOL/L — LOW (ref 22–31)
CREAT SERPL-MCNC: 1.15 MG/DL — SIGNIFICANT CHANGE UP (ref 0.5–1.3)
EGFR: 67 ML/MIN/1.73M2 — SIGNIFICANT CHANGE UP
GLUCOSE BLDC GLUCOMTR-MCNC: 156 MG/DL — HIGH (ref 70–99)
GLUCOSE BLDC GLUCOMTR-MCNC: 179 MG/DL — HIGH (ref 70–99)
GLUCOSE BLDC GLUCOMTR-MCNC: 212 MG/DL — HIGH (ref 70–99)
GLUCOSE BLDC GLUCOMTR-MCNC: 214 MG/DL — HIGH (ref 70–99)
GLUCOSE SERPL-MCNC: 259 MG/DL — HIGH (ref 70–99)
HCT VFR BLD CALC: 47.5 % — SIGNIFICANT CHANGE UP (ref 39–50)
HGB BLD-MCNC: 15.5 G/DL — SIGNIFICANT CHANGE UP (ref 13–17)
MAGNESIUM SERPL-MCNC: 1.9 MG/DL — SIGNIFICANT CHANGE UP (ref 1.6–2.6)
MCHC RBC-ENTMCNC: 28 PG — SIGNIFICANT CHANGE UP (ref 27–34)
MCHC RBC-ENTMCNC: 32.6 GM/DL — SIGNIFICANT CHANGE UP (ref 32–36)
MCV RBC AUTO: 85.9 FL — SIGNIFICANT CHANGE UP (ref 80–100)
NRBC # BLD: 0 /100 WBCS — SIGNIFICANT CHANGE UP (ref 0–0)
NRBC # FLD: 0 K/UL — SIGNIFICANT CHANGE UP (ref 0–0)
PHOSPHATE SERPL-MCNC: 2.5 MG/DL — SIGNIFICANT CHANGE UP (ref 2.5–4.5)
PLATELET # BLD AUTO: 140 K/UL — LOW (ref 150–400)
POTASSIUM SERPL-MCNC: 4.3 MMOL/L — SIGNIFICANT CHANGE UP (ref 3.5–5.3)
POTASSIUM SERPL-SCNC: 4.3 MMOL/L — SIGNIFICANT CHANGE UP (ref 3.5–5.3)
RBC # BLD: 5.53 M/UL — SIGNIFICANT CHANGE UP (ref 4.2–5.8)
RBC # FLD: 13.2 % — SIGNIFICANT CHANGE UP (ref 10.3–14.5)
SODIUM SERPL-SCNC: 135 MMOL/L — SIGNIFICANT CHANGE UP (ref 135–145)
WBC # BLD: 13.68 K/UL — HIGH (ref 3.8–10.5)
WBC # FLD AUTO: 13.68 K/UL — HIGH (ref 3.8–10.5)

## 2023-09-22 PROCEDURE — 71045 X-RAY EXAM CHEST 1 VIEW: CPT | Mod: 26

## 2023-09-22 RX ORDER — PIPERACILLIN AND TAZOBACTAM 4; .5 G/20ML; G/20ML
3.38 INJECTION, POWDER, LYOPHILIZED, FOR SOLUTION INTRAVENOUS ONCE
Refills: 0 | Status: COMPLETED | OUTPATIENT
Start: 2023-09-22 | End: 2023-09-22

## 2023-09-22 RX ORDER — PIPERACILLIN AND TAZOBACTAM 4; .5 G/20ML; G/20ML
3.38 INJECTION, POWDER, LYOPHILIZED, FOR SOLUTION INTRAVENOUS EVERY 8 HOURS
Refills: 0 | Status: DISCONTINUED | OUTPATIENT
Start: 2023-09-23 | End: 2023-09-27

## 2023-09-22 RX ORDER — ACETAMINOPHEN 500 MG
750 TABLET ORAL ONCE
Refills: 0 | Status: COMPLETED | OUTPATIENT
Start: 2023-09-22 | End: 2023-09-22

## 2023-09-22 RX ADMIN — Medication 1: at 09:20

## 2023-09-22 RX ADMIN — PIPERACILLIN AND TAZOBACTAM 200 GRAM(S): 4; .5 INJECTION, POWDER, LYOPHILIZED, FOR SOLUTION INTRAVENOUS at 17:42

## 2023-09-22 RX ADMIN — Medication 750 MILLIGRAM(S): at 21:51

## 2023-09-22 RX ADMIN — Medication 2: at 17:42

## 2023-09-22 RX ADMIN — Medication 81 MILLIGRAM(S): at 13:09

## 2023-09-22 RX ADMIN — HEPARIN SODIUM 5000 UNIT(S): 5000 INJECTION INTRAVENOUS; SUBCUTANEOUS at 17:25

## 2023-09-22 RX ADMIN — LISINOPRIL 20 MILLIGRAM(S): 2.5 TABLET ORAL at 05:56

## 2023-09-22 RX ADMIN — HEPARIN SODIUM 5000 UNIT(S): 5000 INJECTION INTRAVENOUS; SUBCUTANEOUS at 05:56

## 2023-09-22 RX ADMIN — CARVEDILOL PHOSPHATE 6.25 MILLIGRAM(S): 80 CAPSULE, EXTENDED RELEASE ORAL at 17:23

## 2023-09-22 RX ADMIN — Medication 1: at 13:07

## 2023-09-22 RX ADMIN — Medication 300 MILLIGRAM(S): at 21:36

## 2023-09-22 RX ADMIN — Medication 650 MILLIGRAM(S): at 17:16

## 2023-09-22 RX ADMIN — CHLORHEXIDINE GLUCONATE 1 APPLICATION(S): 213 SOLUTION TOPICAL at 05:57

## 2023-09-22 RX ADMIN — PIPERACILLIN AND TAZOBACTAM 25 GRAM(S): 4; .5 INJECTION, POWDER, LYOPHILIZED, FOR SOLUTION INTRAVENOUS at 21:16

## 2023-09-22 RX ADMIN — CARVEDILOL PHOSPHATE 6.25 MILLIGRAM(S): 80 CAPSULE, EXTENDED RELEASE ORAL at 05:56

## 2023-09-22 NOTE — DOWNTIME INTERRUPTION NOTE - WHICH MANUAL FORMS INITIATED?
Assessment completed on patient when admitted to unit. The initial assessment was documented once after sunrise update/down time was completed.

## 2023-09-23 LAB
ANION GAP SERPL CALC-SCNC: 12 MMOL/L — SIGNIFICANT CHANGE UP (ref 7–14)
B PERT DNA SPEC QL NAA+PROBE: SIGNIFICANT CHANGE UP
B PERT+PARAPERT DNA PNL SPEC NAA+PROBE: SIGNIFICANT CHANGE UP
BORDETELLA PARAPERTUSSIS (RAPRVP): SIGNIFICANT CHANGE UP
BUN SERPL-MCNC: 31 MG/DL — HIGH (ref 7–23)
C PNEUM DNA SPEC QL NAA+PROBE: SIGNIFICANT CHANGE UP
CALCIUM SERPL-MCNC: 9.4 MG/DL — SIGNIFICANT CHANGE UP (ref 8.4–10.5)
CHLORIDE SERPL-SCNC: 103 MMOL/L — SIGNIFICANT CHANGE UP (ref 98–107)
CO2 SERPL-SCNC: 23 MMOL/L — SIGNIFICANT CHANGE UP (ref 22–31)
CREAT SERPL-MCNC: 1.73 MG/DL — HIGH (ref 0.5–1.3)
EGFR: 41 ML/MIN/1.73M2 — LOW
FLUAV SUBTYP SPEC NAA+PROBE: SIGNIFICANT CHANGE UP
FLUBV RNA SPEC QL NAA+PROBE: SIGNIFICANT CHANGE UP
GLUCOSE BLDC GLUCOMTR-MCNC: 144 MG/DL — HIGH (ref 70–99)
GLUCOSE BLDC GLUCOMTR-MCNC: 161 MG/DL — HIGH (ref 70–99)
GLUCOSE BLDC GLUCOMTR-MCNC: 192 MG/DL — HIGH (ref 70–99)
GLUCOSE BLDC GLUCOMTR-MCNC: 218 MG/DL — HIGH (ref 70–99)
GLUCOSE SERPL-MCNC: 233 MG/DL — HIGH (ref 70–99)
HADV DNA SPEC QL NAA+PROBE: SIGNIFICANT CHANGE UP
HCOV 229E RNA SPEC QL NAA+PROBE: SIGNIFICANT CHANGE UP
HCOV HKU1 RNA SPEC QL NAA+PROBE: SIGNIFICANT CHANGE UP
HCOV NL63 RNA SPEC QL NAA+PROBE: SIGNIFICANT CHANGE UP
HCOV OC43 RNA SPEC QL NAA+PROBE: SIGNIFICANT CHANGE UP
HCT VFR BLD CALC: 41.9 % — SIGNIFICANT CHANGE UP (ref 39–50)
HGB BLD-MCNC: 13.8 G/DL — SIGNIFICANT CHANGE UP (ref 13–17)
HMPV RNA SPEC QL NAA+PROBE: SIGNIFICANT CHANGE UP
HPIV1 RNA SPEC QL NAA+PROBE: SIGNIFICANT CHANGE UP
HPIV2 RNA SPEC QL NAA+PROBE: SIGNIFICANT CHANGE UP
HPIV3 RNA SPEC QL NAA+PROBE: SIGNIFICANT CHANGE UP
HPIV4 RNA SPEC QL NAA+PROBE: SIGNIFICANT CHANGE UP
M PNEUMO DNA SPEC QL NAA+PROBE: SIGNIFICANT CHANGE UP
MCHC RBC-ENTMCNC: 28.6 PG — SIGNIFICANT CHANGE UP (ref 27–34)
MCHC RBC-ENTMCNC: 32.9 GM/DL — SIGNIFICANT CHANGE UP (ref 32–36)
MCV RBC AUTO: 86.9 FL — SIGNIFICANT CHANGE UP (ref 80–100)
NRBC # BLD: 0 /100 WBCS — SIGNIFICANT CHANGE UP (ref 0–0)
NRBC # FLD: 0 K/UL — SIGNIFICANT CHANGE UP (ref 0–0)
PLATELET # BLD AUTO: 140 K/UL — LOW (ref 150–400)
POTASSIUM SERPL-MCNC: 3.7 MMOL/L — SIGNIFICANT CHANGE UP (ref 3.5–5.3)
POTASSIUM SERPL-SCNC: 3.7 MMOL/L — SIGNIFICANT CHANGE UP (ref 3.5–5.3)
RAPID RVP RESULT: SIGNIFICANT CHANGE UP
RBC # BLD: 4.82 M/UL — SIGNIFICANT CHANGE UP (ref 4.2–5.8)
RBC # FLD: 13.4 % — SIGNIFICANT CHANGE UP (ref 10.3–14.5)
RSV RNA SPEC QL NAA+PROBE: SIGNIFICANT CHANGE UP
RV+EV RNA SPEC QL NAA+PROBE: SIGNIFICANT CHANGE UP
SARS-COV-2 RNA SPEC QL NAA+PROBE: SIGNIFICANT CHANGE UP
SODIUM SERPL-SCNC: 138 MMOL/L — SIGNIFICANT CHANGE UP (ref 135–145)
WBC # BLD: 13.04 K/UL — HIGH (ref 3.8–10.5)
WBC # FLD AUTO: 13.04 K/UL — HIGH (ref 3.8–10.5)

## 2023-09-23 PROCEDURE — 99232 SBSQ HOSP IP/OBS MODERATE 35: CPT

## 2023-09-23 RX ADMIN — PIPERACILLIN AND TAZOBACTAM 25 GRAM(S): 4; .5 INJECTION, POWDER, LYOPHILIZED, FOR SOLUTION INTRAVENOUS at 13:21

## 2023-09-23 RX ADMIN — TAMSULOSIN HYDROCHLORIDE 0.4 MILLIGRAM(S): 0.4 CAPSULE ORAL at 21:32

## 2023-09-23 RX ADMIN — PIPERACILLIN AND TAZOBACTAM 25 GRAM(S): 4; .5 INJECTION, POWDER, LYOPHILIZED, FOR SOLUTION INTRAVENOUS at 05:49

## 2023-09-23 RX ADMIN — Medication 1: at 13:19

## 2023-09-23 RX ADMIN — PIPERACILLIN AND TAZOBACTAM 25 GRAM(S): 4; .5 INJECTION, POWDER, LYOPHILIZED, FOR SOLUTION INTRAVENOUS at 21:28

## 2023-09-23 RX ADMIN — HEPARIN SODIUM 5000 UNIT(S): 5000 INJECTION INTRAVENOUS; SUBCUTANEOUS at 17:22

## 2023-09-23 RX ADMIN — Medication 81 MILLIGRAM(S): at 13:20

## 2023-09-23 RX ADMIN — ATORVASTATIN CALCIUM 40 MILLIGRAM(S): 80 TABLET, FILM COATED ORAL at 21:32

## 2023-09-23 RX ADMIN — CHLORHEXIDINE GLUCONATE 1 APPLICATION(S): 213 SOLUTION TOPICAL at 05:49

## 2023-09-23 RX ADMIN — Medication 2: at 08:37

## 2023-09-23 RX ADMIN — HEPARIN SODIUM 5000 UNIT(S): 5000 INJECTION INTRAVENOUS; SUBCUTANEOUS at 05:49

## 2023-09-23 RX ADMIN — CARVEDILOL PHOSPHATE 6.25 MILLIGRAM(S): 80 CAPSULE, EXTENDED RELEASE ORAL at 17:23

## 2023-09-24 LAB
ANION GAP SERPL CALC-SCNC: 17 MMOL/L — HIGH (ref 7–14)
APPEARANCE UR: ABNORMAL
BACTERIA # UR AUTO: NEGATIVE /HPF — SIGNIFICANT CHANGE UP
BILIRUB UR-MCNC: NEGATIVE — SIGNIFICANT CHANGE UP
BUN SERPL-MCNC: 44 MG/DL — HIGH (ref 7–23)
CALCIUM SERPL-MCNC: 9.4 MG/DL — SIGNIFICANT CHANGE UP (ref 8.4–10.5)
CAST: 0 /LPF — SIGNIFICANT CHANGE UP (ref 0–4)
CHLORIDE SERPL-SCNC: 102 MMOL/L — SIGNIFICANT CHANGE UP (ref 98–107)
CO2 SERPL-SCNC: 22 MMOL/L — SIGNIFICANT CHANGE UP (ref 22–31)
COLOR SPEC: YELLOW — SIGNIFICANT CHANGE UP
CREAT ?TM UR-MCNC: 102 MG/DL — SIGNIFICANT CHANGE UP
CREAT ?TM UR-MCNC: 102 MG/DL — SIGNIFICANT CHANGE UP
CREAT SERPL-MCNC: 1.96 MG/DL — HIGH (ref 0.5–1.3)
DIFF PNL FLD: NEGATIVE — SIGNIFICANT CHANGE UP
EGFR: 35 ML/MIN/1.73M2 — LOW
GLUCOSE BLDC GLUCOMTR-MCNC: 157 MG/DL — HIGH (ref 70–99)
GLUCOSE BLDC GLUCOMTR-MCNC: 165 MG/DL — HIGH (ref 70–99)
GLUCOSE BLDC GLUCOMTR-MCNC: 203 MG/DL — HIGH (ref 70–99)
GLUCOSE BLDC GLUCOMTR-MCNC: 223 MG/DL — HIGH (ref 70–99)
GLUCOSE SERPL-MCNC: 154 MG/DL — HIGH (ref 70–99)
GLUCOSE UR QL: >=1000 MG/DL
HCT VFR BLD CALC: 40.8 % — SIGNIFICANT CHANGE UP (ref 39–50)
HGB BLD-MCNC: 13.1 G/DL — SIGNIFICANT CHANGE UP (ref 13–17)
KETONES UR-MCNC: NEGATIVE MG/DL — SIGNIFICANT CHANGE UP
LEUKOCYTE ESTERASE UR-ACNC: NEGATIVE — SIGNIFICANT CHANGE UP
MCHC RBC-ENTMCNC: 27.9 PG — SIGNIFICANT CHANGE UP (ref 27–34)
MCHC RBC-ENTMCNC: 32.1 GM/DL — SIGNIFICANT CHANGE UP (ref 32–36)
MCV RBC AUTO: 86.8 FL — SIGNIFICANT CHANGE UP (ref 80–100)
NITRITE UR-MCNC: NEGATIVE — SIGNIFICANT CHANGE UP
NRBC # BLD: 0 /100 WBCS — SIGNIFICANT CHANGE UP (ref 0–0)
NRBC # FLD: 0 K/UL — SIGNIFICANT CHANGE UP (ref 0–0)
PH UR: 5.5 — SIGNIFICANT CHANGE UP (ref 5–8)
PLATELET # BLD AUTO: 123 K/UL — LOW (ref 150–400)
POTASSIUM SERPL-MCNC: 3.7 MMOL/L — SIGNIFICANT CHANGE UP (ref 3.5–5.3)
POTASSIUM SERPL-SCNC: 3.7 MMOL/L — SIGNIFICANT CHANGE UP (ref 3.5–5.3)
PROT UR-MCNC: 30 MG/DL
RBC # BLD: 4.7 M/UL — SIGNIFICANT CHANGE UP (ref 4.2–5.8)
RBC # FLD: 13.4 % — SIGNIFICANT CHANGE UP (ref 10.3–14.5)
RBC CASTS # UR COMP ASSIST: 1 /HPF — SIGNIFICANT CHANGE UP (ref 0–4)
SODIUM SERPL-SCNC: 141 MMOL/L — SIGNIFICANT CHANGE UP (ref 135–145)
SODIUM UR-SCNC: 20 MMOL/L — SIGNIFICANT CHANGE UP
SODIUM UR-SCNC: 21 MMOL/L — SIGNIFICANT CHANGE UP
SP GR SPEC: 1.03 — HIGH (ref 1–1.03)
SQUAMOUS # UR AUTO: 3 /HPF — SIGNIFICANT CHANGE UP (ref 0–5)
UROBILINOGEN FLD QL: 1 MG/DL — SIGNIFICANT CHANGE UP (ref 0.2–1)
WBC # BLD: 12.55 K/UL — HIGH (ref 3.8–10.5)
WBC # FLD AUTO: 12.55 K/UL — HIGH (ref 3.8–10.5)
WBC UR QL: 2 /HPF — SIGNIFICANT CHANGE UP (ref 0–5)

## 2023-09-24 PROCEDURE — 76770 US EXAM ABDO BACK WALL COMP: CPT | Mod: 26

## 2023-09-24 RX ORDER — IPRATROPIUM/ALBUTEROL SULFATE 18-103MCG
3 AEROSOL WITH ADAPTER (GRAM) INHALATION EVERY 6 HOURS
Refills: 0 | Status: DISCONTINUED | OUTPATIENT
Start: 2023-09-24 | End: 2023-09-27

## 2023-09-24 RX ORDER — POLYETHYLENE GLYCOL 3350 17 G/17G
17 POWDER, FOR SOLUTION ORAL DAILY
Refills: 0 | Status: DISCONTINUED | OUTPATIENT
Start: 2023-09-24 | End: 2023-09-27

## 2023-09-24 RX ORDER — SODIUM CHLORIDE 9 MG/ML
1000 INJECTION INTRAMUSCULAR; INTRAVENOUS; SUBCUTANEOUS
Refills: 0 | Status: DISCONTINUED | OUTPATIENT
Start: 2023-09-24 | End: 2023-09-24

## 2023-09-24 RX ORDER — SENNA PLUS 8.6 MG/1
2 TABLET ORAL AT BEDTIME
Refills: 0 | Status: DISCONTINUED | OUTPATIENT
Start: 2023-09-24 | End: 2023-09-27

## 2023-09-24 RX ORDER — SODIUM CHLORIDE 9 MG/ML
1000 INJECTION INTRAMUSCULAR; INTRAVENOUS; SUBCUTANEOUS
Refills: 0 | Status: DISCONTINUED | OUTPATIENT
Start: 2023-09-24 | End: 2023-09-25

## 2023-09-24 RX ORDER — ASPIRIN/CALCIUM CARB/MAGNESIUM 324 MG
81 TABLET ORAL DAILY
Refills: 0 | Status: DISCONTINUED | OUTPATIENT
Start: 2023-09-24 | End: 2023-09-27

## 2023-09-24 RX ADMIN — ATORVASTATIN CALCIUM 40 MILLIGRAM(S): 80 TABLET, FILM COATED ORAL at 21:59

## 2023-09-24 RX ADMIN — PIPERACILLIN AND TAZOBACTAM 25 GRAM(S): 4; .5 INJECTION, POWDER, LYOPHILIZED, FOR SOLUTION INTRAVENOUS at 14:37

## 2023-09-24 RX ADMIN — PIPERACILLIN AND TAZOBACTAM 25 GRAM(S): 4; .5 INJECTION, POWDER, LYOPHILIZED, FOR SOLUTION INTRAVENOUS at 06:18

## 2023-09-24 RX ADMIN — CARVEDILOL PHOSPHATE 6.25 MILLIGRAM(S): 80 CAPSULE, EXTENDED RELEASE ORAL at 06:41

## 2023-09-24 RX ADMIN — Medication 3 MILLILITER(S): at 22:21

## 2023-09-24 RX ADMIN — CARVEDILOL PHOSPHATE 6.25 MILLIGRAM(S): 80 CAPSULE, EXTENDED RELEASE ORAL at 18:26

## 2023-09-24 RX ADMIN — TAMSULOSIN HYDROCHLORIDE 0.4 MILLIGRAM(S): 0.4 CAPSULE ORAL at 22:00

## 2023-09-24 RX ADMIN — Medication 1: at 09:01

## 2023-09-24 RX ADMIN — HEPARIN SODIUM 5000 UNIT(S): 5000 INJECTION INTRAVENOUS; SUBCUTANEOUS at 06:18

## 2023-09-24 RX ADMIN — POLYETHYLENE GLYCOL 3350 17 GRAM(S): 17 POWDER, FOR SOLUTION ORAL at 14:37

## 2023-09-24 RX ADMIN — LISINOPRIL 20 MILLIGRAM(S): 2.5 TABLET ORAL at 06:41

## 2023-09-24 RX ADMIN — HEPARIN SODIUM 5000 UNIT(S): 5000 INJECTION INTRAVENOUS; SUBCUTANEOUS at 18:27

## 2023-09-24 RX ADMIN — Medication 2: at 13:04

## 2023-09-24 RX ADMIN — PIPERACILLIN AND TAZOBACTAM 25 GRAM(S): 4; .5 INJECTION, POWDER, LYOPHILIZED, FOR SOLUTION INTRAVENOUS at 22:00

## 2023-09-24 RX ADMIN — Medication 1: at 18:24

## 2023-09-24 RX ADMIN — SODIUM CHLORIDE 60 MILLILITER(S): 9 INJECTION INTRAMUSCULAR; INTRAVENOUS; SUBCUTANEOUS at 12:30

## 2023-09-24 RX ADMIN — SENNA PLUS 2 TABLET(S): 8.6 TABLET ORAL at 21:59

## 2023-09-24 RX ADMIN — CHLORHEXIDINE GLUCONATE 1 APPLICATION(S): 213 SOLUTION TOPICAL at 12:43

## 2023-09-24 NOTE — CONSULT NOTE ADULT - ASSESSMENT
72 yo man with diabetes, h/o covid, h/o CVA 2021 who developed nausea and vomiting yesterday found to have electrolytes abnormalities and leucocytosis.   Feeling overall better now; no longer nauseous, taking some food with assistance from son at bedside.  No fever, chills, dysuria, diarrhea.   Afebrile.   No localizing signs of infection on exam.  UA unremarkable.  C x R clear   covid, flu, RSV PCR negative     Leucocytosis unclear whether due to infection or reactive to other process   No obvious focus of infection on exam    ?Gastroenteritis     Suggest;   if develops diarrhea please send stool GI PCR   if fever check blood culture x 2    would observe off antibiotics for now     will follow 
73y Male with history of HTN, DM, BPH presents with weakness and vomiting. Nephrology consulted for elevated Scr.    1) BERTA: likely hemodynamically mediated in setting of poor PO intake on ACE-I. Scr increasing. Check UA, urine sodium and urine creatinine. Given h/o BPH and distended abdomen, check renal US. Check LDH/haptoglobin r/o TMA. Trial of IVF. Avoid nephrotoxins. Monitor electrolytes.    2) HTN: BP controlled. D/C lisinopril given BERTA. Monitor BP.    3) BPH without LUTS: Continue with flomax. Check renal US as above to rule out urinary retention.    4) Fevers: On Zosyn. As per ID.    [facility] NEPHROLOGY  Alessandro Arellano M.D.  MERLE AlyO.  Kristine Calvin M.D.  MD Marta Hensley, MSN, ANP-C    Telephone: [phone]  Facsimile: [phone]    [facility] [place] [address], [address]  [place] [address]

## 2023-09-24 NOTE — SWALLOW BEDSIDE ASSESSMENT ADULT - ORAL PREPARATORY PHASE
Slow Bolus Manipulation Slow/Prolonged Gumming/Mashing with eventual expectoration of bolus from oral cavity Within functional limits

## 2023-09-24 NOTE — SWALLOW BEDSIDE ASSESSMENT ADULT - COMMENTS
Progress Note- Internal Medicine 9/23: "74 yo M with DM, HTN, CVA (apx 4954-6015) w/o residual deficits presented with 1 day of nausea, vomiting along with generalized weakness, admitted for further evaluation."    CXR 9/22: "IMPRESSION: No acute pulmonary disease."    Of note, patient is known to this service, seen during a previous admission in August 2022 with recommendations of Minced and Moist Solids with Thin Liquids. (see report for details)     Patient is awake/alert during clinical swallow evaluation this AM. Patient is Slovenian speaking, [employer] utilized (ID# [id number]) for translation. Patient did not verbalize throughout evaluation however follows simple one step directives and answers simple yes/no questions via gestures (head nodding). Patient is eating regular breakfast tray upon arrival. Per RN and report from overnight RN, patient has been demonstrating observed difficulty swallowing noted with coughing and pocketing.

## 2023-09-24 NOTE — SWALLOW BEDSIDE ASSESSMENT ADULT - PHARYNGEAL PHASE
Within functional limits Cough post oral intake Throat clear post oral intake Unable to assess given bolus expectoration

## 2023-09-24 NOTE — CONSULT NOTE ADULT - SUBJECTIVE AND OBJECTIVE BOX
[facility] NEPHROLOGY- CONSULTATION NOTE    73y Male with history of below presents with weakness and vomiting. Nephrology consulted for elevated Scr.    Patient with normal Scr at baseline with BERTA on current admission. Patient on lisinopril 20 mg daily which was discontinued this morning. Patient on farxiga as an outpatient which was not continued on admission. Patient without any hypotensive episodes. Patient without any IV contrast studies. Patient did not get any NSAIDS.    Patient not on any IVF with questionable PO intake as per RN.    REVIEW OF SYSTEMS:  Gen: no fevers  HEENT: no rhinorrhea  Neck: no sore throat  Cards: no chest pain  Resp: no dyspnea  GI: no nausea or vomiting or diarrhea  : no dysuria or hematuria  Vascular: no LE edema  Derm: no rashes  Neuro: no numbness/tingling    No Known Allergies      Home Medications Reviewed  Hospital Medications:   MEDICATIONS  (STANDING):  aspirin enteric coated 81 milliGRAM(s) Oral daily  atorvastatin 40 milliGRAM(s) Oral at bedtime  carvedilol 6.25 milliGRAM(s) Oral every 12 hours  chlorhexidine 2% Cloths 1 Application(s) Topical <User Schedule>  dextrose 5%. 1000 milliLiter(s) (100 mL/Hr) IV Continuous <Continuous>  dextrose 5%. 1000 milliLiter(s) (50 mL/Hr) IV Continuous <Continuous>  dextrose 50% Injectable 12.5 Gram(s) IV Push once  dextrose 50% Injectable 25 Gram(s) IV Push once  dextrose 50% Injectable 25 Gram(s) IV Push once  glucagon  Injectable 1 milliGRAM(s) IntraMuscular once  heparin   Injectable 5000 Unit(s) SubCutaneous every 12 hours  influenza  Vaccine (HIGH DOSE) 0.7 milliLiter(s) IntraMuscular once  insulin lispro (ADMELOG) corrective regimen sliding scale   SubCutaneous at bedtime  insulin lispro (ADMELOG) corrective regimen sliding scale   SubCutaneous three times a day before meals  piperacillin/tazobactam IVPB.. 3.375 Gram(s) IV Intermittent every 8 hours  tamsulosin 0.4 milliGRAM(s) Oral at bedtime      PAST MEDICAL & SURGICAL HISTORY:  Type 2 diabetes mellitus      CVA (cerebrovascular accident)      HTN (hypertension)      History of BPH      No significant past surgical history          FAMILY HISTORY:  No pertinent family history in first degree relatives        SOCIAL HISTORY:  Denies toxic substance use     VITALS:  T(F): 98.3 (09-24-23 @ 06:47), Max: 99.3 (09-23-23 @ 23:22)  HR: 82 (09-24-23 @ 06:47)  BP: 116/86 (09-24-23 @ 06:47)  RR: 18 (09-24-23 @ 06:47)  SpO2: 97% (09-24-23 @ 06:47)  Wt(kg): --        PHYSICAL EXAM:  Gen: NAD, calm  HEENT: dry MM  Neck: no JVD  Cards: RRR, +S1/S2, no M/G/R  Resp: CTA B/L  GI: soft, NT, + ab distention, NABS  : no CVA tenderness, + condom catheter  Vascular: no LE edema B/L  Derm: no rashes  Neuro: non-focal    LABS:  09-24    141  |  102  |  44<H>  ----------------------------<  154<H>  3.7   |  22  |  1.96<H>    Ca    9.4      24 Sep 2023 05:05  Phos  2.5     09-22  Mg     1.90     09-22      Creatinine Trend: 1.96 <--, 1.73 <--, 1.15 <--, 1.14 <--, 0.93 <--, 1.02 <--                        13.1   12.55 )-----------( 123      ( 24 Sep 2023 05:05 )             40.8     Urine Studies:  Urinalysis Basic - ( 24 Sep 2023 05:05 )    Color:  / Appearance:  / SG:  / pH:   Gluc: 154 mg/dL / Ketone:   / Bili:  / Urobili:    Blood:  / Protein:  / Nitrite:    Leuk Esterase:  / RBC:  / WBC    Sq Epi:  / Non Sq Epi:  / Bacteria:           RADIOLOGY & ADDITIONAL STUDIES:    < from: Xray Chest 1 View- PORTABLE-Urgent (Xray Chest 1 View- PORTABLE-Urgent .) (09.22.23 @ 22:17) >  IMPRESSION:  No acute pulmonary disease.    --- End ofReport ---      < end of copied text >      < from: CT Head No Cont (09.20.23 @ 06:30) >  IMPRESSION:  No acute intracranial hemorrhage, mass effect, or midline shift. Chronic   changes as described above.    --- End of Report ---    < end of copied text >      < from: Xray Chest 1 View AP/PA (09.20.23 @ 05:18) >  IMPRESSION:    No focal consolidation.    --- End of Report ---    < end of copied text >  
HPI:  72 yo M with DM, HTN, CVA (apx [id number]) w/o residual deficits presented with 1 day of nausea, vomiting along with generalized weakness. Collateral provided by son Severo. Patient in usual state of health until he ate dinner last night (mashed beans), afterward experienced nausea and vomiting (NBNB). No one at home with similar symptoms. No fevers. Sleepy this morning, per son the patient was awake all night and had just fallen asleep. No sick contacts.  (20 Sep 2023 12:29)    ID- seen in ER.  son at bedside.  taking food now w/o nausea.   denies headache, chills, dysuria.        PAST MEDICAL & SURGICAL HISTORY:  Type 2 diabetes mellitus      CVA (cerebrovascular accident)      HTN (hypertension)      History of BPH      No significant past surgical history          Allergies    No Known Allergies    Intolerances        ANTIMICROBIALS:      OTHER MEDS:  acetaminophen     Tablet .. 650 milliGRAM(s) Oral every 6 hours PRN  aspirin enteric coated 81 milliGRAM(s) Oral daily  atorvastatin 40 milliGRAM(s) Oral at bedtime  carvedilol 6.25 milliGRAM(s) Oral every 12 hours  dextrose 5% + sodium chloride 0.9%. 1000 milliLiter(s) IV Continuous <Continuous>  dextrose 5%. 1000 milliLiter(s) IV Continuous <Continuous>  dextrose 5%. 1000 milliLiter(s) IV Continuous <Continuous>  dextrose 50% Injectable 25 Gram(s) IV Push once  dextrose 50% Injectable 12.5 Gram(s) IV Push once  dextrose 50% Injectable 25 Gram(s) IV Push once  dextrose Oral Gel 15 Gram(s) Oral once PRN  glucagon  Injectable 1 milliGRAM(s) IntraMuscular once  heparin   Injectable 5000 Unit(s) SubCutaneous every 12 hours  insulin lispro (ADMELOG) corrective regimen sliding scale   SubCutaneous three times a day before meals  insulin lispro (ADMELOG) corrective regimen sliding scale   SubCutaneous at bedtime  lisinopril 20 milliGRAM(s) Oral daily  tamsulosin 0.4 milliGRAM(s) Oral at bedtime      SOCIAL HISTORY:    FAMILY HISTORY:  No pertinent family history in first degree relatives        REVIEW OF SYSTEMS  [  ] ROS unobtainable because:    [x] All other systems negative except as noted below:	    Constitutional:  [ ] fever [ ] chills  [ ] weight loss  [x ] weakness  Skin:  [ ] rash [ ] phlebitis	  Eyes: [ ] icterus [ ] pain  [ ] discharge	  ENMT: [ ] sore throat  [ ] thrush [ ] ulcers [ ] exudates  Respiratory: [ ] dyspnea [ ] hemoptysis [ ] cough [ ] sputum	  Cardiovascular:  [ ] chest pain [ ] palpitations [ ] edema	  Gastrointestinal:  [x ] nausea [ ] vomiting [ ] diarrhea [ ] constipation [ ] pain	  Genitourinary:  [ ] dysuria [ ] frequency [ ] hematuria [ ] discharge [ ] flank pain  [ ] incontinence  Musculoskeletal:  [ ] myalgias [ ] arthralgias [ ] arthritis  [ ] back pain  Neurological:  [ ] headache [ ] seizures  [ ] confusion/altered mental status  Psychiatric:  [ ] anxiety [ ] depression	  Hematology/Lymphatics:  [ ] lymphadenopathy  Endocrine:  [ ] adrenal [ ] thyroid  Allergic/Immunologic:	 [ ] transplant [ ] seasonal    PHYSICAL EXAM:  Constitutional: Not in acute distress, lying on stretcher, eating with assistance from son   Eyes: No icterus.  Oral cavity: dry  Neck: Supple  RS: no respiratory distress RA  CVS: S1, S2   Abdomen: Soft. No guarding/rigidity/tenderness.  : no durham  Neuro: Alert, oriented to time/place/person  Cranial nerves 2-12 grossly normal. No focal abnormalities    Drug Dosing Weight  Height (cm): 167.6 (16 Aug 2022 19:56)  Weight (kg): 62.4 (16 Aug 2022 19:56)  BMI (kg/m2): 22.2 (16 Aug 2022 19:56)  BSA (m2): 1.71 (16 Aug 2022 19:56)    Vital Signs Last 24 Hrs  T(F): 98.3 (09-20-23 @ 17:35), Max: 98.3 (09-20-23 @ 17:35)    Vital Signs Last 24 Hrs  HR: 80 (09-20-23 @ 17:35) (54 - 95)  BP: 159/93 (09-20-23 @ 17:35) (159/93 - 195/117)  RR: 17 (09-20-23 @ 17:35)  SpO2: 100% (09-20-23 @ 17:35) (98% - 100%)  Wt(kg): --                          15.1   20.24 )-----------( 176      ( 20 Sep 2023 07:30 )             45.7       09-20    142  |  106  |  18  ----------------------------<  178<H>  4.1   |  19<L>  |  0.93    Ca    9.9      20 Sep 2023 07:30  Phos  2.8     09-20  Mg     2.10     09-20    TPro  8.1  /  Alb  4.3  /  TBili  0.6  /  DBili  x   /  AST  22  /  ALT  15  /  AlkPhos  97  09-20      Urinalysis Basic - ( 20 Sep 2023 07:30 )    Color: x / Appearance: x / SG: x / pH: x  Gluc: 178 mg/dL / Ketone: x  / Bili: x / Urobili: x   Blood: x / Protein: x / Nitrite: x   Leuk Esterase: x / RBC: x / WBC x   Sq Epi: x / Non Sq Epi: x / Bacteria: x        MICROBIOLOGY:          RADIOLOGY:    < from: CT Head No Cont (09.20.23 @ 06:30) >  IMPRESSION:  No acute intracranial hemorrhage, mass effect, or midline shift. Chronic   changes as described above.    --- End of Report ---    < end of copied text >  < from: Xray Chest 1 View AP/PA (09.20.23 @ 05:18) >  ACC: [id number] EXAM:  XR CHEST AP OR PA 1V   ORDERED BY: DAVID DAVID     PROCEDURE DATE:  09/20/2023          INTERPRETATION:  CLINICAL INDICATION:  Sepsis    COMPARISON: Chest radiograph dated 8/16/2022    TECHNIQUE: Frontal radiograph of the chest.    FINDINGS:    Cardiac/mediastinum/hilum: Heart size is within normal limits.    Lung parenchyma/Pleura: The lungs are clear. There is no pleural   effusion. There is no pneumothorax.    Skeleton/soft tissues: No acute osseous abnormalities.    IMPRESSION:    No focal consolidation.    --- End of Report ---    < end of copied text >

## 2023-09-24 NOTE — SWALLOW BEDSIDE ASSESSMENT ADULT - ASR SWALLOW RECOMMEND DIAG
Objective testing not warranted at this time given overt signs of impaired airway protection (cough/throat clears) on mildly thick and thin liquid only and recent CXR 9/22 indicating no acute findings

## 2023-09-24 NOTE — SWALLOW BEDSIDE ASSESSMENT ADULT - SWALLOW EVAL: RECOMMENDED FEEDING/EATING TECHNIQUES
Swallowing Guidelines: Seated Upright during Mealtimes; Slow Pacing; Puree via Teaspoon; Single Sips of Moderately Thick Liquids; Allow for Swallow Prior to Next Presentation; Maintain Oral Hygiene

## 2023-09-25 LAB
ANION GAP SERPL CALC-SCNC: 16 MMOL/L — HIGH (ref 7–14)
BUN SERPL-MCNC: 39 MG/DL — HIGH (ref 7–23)
CALCIUM SERPL-MCNC: 9.3 MG/DL — SIGNIFICANT CHANGE UP (ref 8.4–10.5)
CHLORIDE SERPL-SCNC: 104 MMOL/L — SIGNIFICANT CHANGE UP (ref 98–107)
CO2 SERPL-SCNC: 22 MMOL/L — SIGNIFICANT CHANGE UP (ref 22–31)
CREAT SERPL-MCNC: 1.58 MG/DL — HIGH (ref 0.5–1.3)
CULTURE RESULTS: SIGNIFICANT CHANGE UP
CULTURE RESULTS: SIGNIFICANT CHANGE UP
EGFR: 46 ML/MIN/1.73M2 — LOW
GLUCOSE BLDC GLUCOMTR-MCNC: 138 MG/DL — HIGH (ref 70–99)
GLUCOSE BLDC GLUCOMTR-MCNC: 161 MG/DL — HIGH (ref 70–99)
GLUCOSE BLDC GLUCOMTR-MCNC: 192 MG/DL — HIGH (ref 70–99)
GLUCOSE BLDC GLUCOMTR-MCNC: 202 MG/DL — HIGH (ref 70–99)
GLUCOSE SERPL-MCNC: 157 MG/DL — HIGH (ref 70–99)
HAPTOGLOB SERPL-MCNC: 275 MG/DL — HIGH (ref 34–200)
HCT VFR BLD CALC: 38.8 % — LOW (ref 39–50)
HGB BLD-MCNC: 12.3 G/DL — LOW (ref 13–17)
LDH SERPL L TO P-CCNC: 175 U/L — SIGNIFICANT CHANGE UP (ref 135–225)
MAGNESIUM SERPL-MCNC: 2.2 MG/DL — SIGNIFICANT CHANGE UP (ref 1.6–2.6)
MCHC RBC-ENTMCNC: 27.6 PG — SIGNIFICANT CHANGE UP (ref 27–34)
MCHC RBC-ENTMCNC: 31.7 GM/DL — LOW (ref 32–36)
MCV RBC AUTO: 87.2 FL — SIGNIFICANT CHANGE UP (ref 80–100)
NRBC # BLD: 0 /100 WBCS — SIGNIFICANT CHANGE UP (ref 0–0)
NRBC # FLD: 0 K/UL — SIGNIFICANT CHANGE UP (ref 0–0)
PHOSPHATE SERPL-MCNC: 2.5 MG/DL — SIGNIFICANT CHANGE UP (ref 2.5–4.5)
PLATELET # BLD AUTO: 126 K/UL — LOW (ref 150–400)
POTASSIUM SERPL-MCNC: 3.8 MMOL/L — SIGNIFICANT CHANGE UP (ref 3.5–5.3)
POTASSIUM SERPL-SCNC: 3.8 MMOL/L — SIGNIFICANT CHANGE UP (ref 3.5–5.3)
RBC # BLD: 4.45 M/UL — SIGNIFICANT CHANGE UP (ref 4.2–5.8)
RBC # FLD: 13.6 % — SIGNIFICANT CHANGE UP (ref 10.3–14.5)
SODIUM SERPL-SCNC: 142 MMOL/L — SIGNIFICANT CHANGE UP (ref 135–145)
SPECIMEN SOURCE: SIGNIFICANT CHANGE UP
SPECIMEN SOURCE: SIGNIFICANT CHANGE UP
WBC # BLD: 10.27 K/UL — SIGNIFICANT CHANGE UP (ref 3.8–10.5)
WBC # FLD AUTO: 10.27 K/UL — SIGNIFICANT CHANGE UP (ref 3.8–10.5)

## 2023-09-25 PROCEDURE — 99232 SBSQ HOSP IP/OBS MODERATE 35: CPT

## 2023-09-25 PROCEDURE — 74176 CT ABD & PELVIS W/O CONTRAST: CPT | Mod: 26

## 2023-09-25 RX ORDER — SODIUM CHLORIDE 9 MG/ML
1000 INJECTION INTRAMUSCULAR; INTRAVENOUS; SUBCUTANEOUS
Refills: 0 | Status: DISCONTINUED | OUTPATIENT
Start: 2023-09-25 | End: 2023-09-26

## 2023-09-25 RX ADMIN — CARVEDILOL PHOSPHATE 6.25 MILLIGRAM(S): 80 CAPSULE, EXTENDED RELEASE ORAL at 17:47

## 2023-09-25 RX ADMIN — PIPERACILLIN AND TAZOBACTAM 25 GRAM(S): 4; .5 INJECTION, POWDER, LYOPHILIZED, FOR SOLUTION INTRAVENOUS at 06:59

## 2023-09-25 RX ADMIN — SODIUM CHLORIDE 60 MILLILITER(S): 9 INJECTION INTRAMUSCULAR; INTRAVENOUS; SUBCUTANEOUS at 13:54

## 2023-09-25 RX ADMIN — Medication 3 MILLILITER(S): at 15:53

## 2023-09-25 RX ADMIN — SENNA PLUS 2 TABLET(S): 8.6 TABLET ORAL at 21:48

## 2023-09-25 RX ADMIN — Medication 1: at 12:46

## 2023-09-25 RX ADMIN — HEPARIN SODIUM 5000 UNIT(S): 5000 INJECTION INTRAVENOUS; SUBCUTANEOUS at 17:47

## 2023-09-25 RX ADMIN — CHLORHEXIDINE GLUCONATE 1 APPLICATION(S): 213 SOLUTION TOPICAL at 06:59

## 2023-09-25 RX ADMIN — Medication 3 MILLILITER(S): at 21:09

## 2023-09-25 RX ADMIN — ATORVASTATIN CALCIUM 40 MILLIGRAM(S): 80 TABLET, FILM COATED ORAL at 21:54

## 2023-09-25 RX ADMIN — Medication 3 MILLILITER(S): at 03:40

## 2023-09-25 RX ADMIN — TAMSULOSIN HYDROCHLORIDE 0.4 MILLIGRAM(S): 0.4 CAPSULE ORAL at 21:48

## 2023-09-25 RX ADMIN — Medication 2: at 17:58

## 2023-09-25 RX ADMIN — Medication 81 MILLIGRAM(S): at 12:47

## 2023-09-25 RX ADMIN — Medication 3 MILLILITER(S): at 10:10

## 2023-09-25 RX ADMIN — CARVEDILOL PHOSPHATE 6.25 MILLIGRAM(S): 80 CAPSULE, EXTENDED RELEASE ORAL at 06:59

## 2023-09-25 RX ADMIN — PIPERACILLIN AND TAZOBACTAM 25 GRAM(S): 4; .5 INJECTION, POWDER, LYOPHILIZED, FOR SOLUTION INTRAVENOUS at 21:47

## 2023-09-25 RX ADMIN — PIPERACILLIN AND TAZOBACTAM 25 GRAM(S): 4; .5 INJECTION, POWDER, LYOPHILIZED, FOR SOLUTION INTRAVENOUS at 13:54

## 2023-09-25 RX ADMIN — POLYETHYLENE GLYCOL 3350 17 GRAM(S): 17 POWDER, FOR SOLUTION ORAL at 12:47

## 2023-09-25 RX ADMIN — HEPARIN SODIUM 5000 UNIT(S): 5000 INJECTION INTRAVENOUS; SUBCUTANEOUS at 06:59

## 2023-09-26 ENCOUNTER — TRANSCRIPTION ENCOUNTER (OUTPATIENT)
Age: 73
End: 2023-09-26

## 2023-09-26 DIAGNOSIS — K59.00 CONSTIPATION, UNSPECIFIED: ICD-10-CM

## 2023-09-26 DIAGNOSIS — N40.0 BENIGN PROSTATIC HYPERPLASIA WITHOUT LOWER URINARY TRACT SYMPTOMS: ICD-10-CM

## 2023-09-26 DIAGNOSIS — D72.829 ELEVATED WHITE BLOOD CELL COUNT, UNSPECIFIED: ICD-10-CM

## 2023-09-26 DIAGNOSIS — N17.9 ACUTE KIDNEY FAILURE, UNSPECIFIED: ICD-10-CM

## 2023-09-26 LAB
GLUCOSE BLDC GLUCOMTR-MCNC: 142 MG/DL — HIGH (ref 70–99)
GLUCOSE BLDC GLUCOMTR-MCNC: 155 MG/DL — HIGH (ref 70–99)
GLUCOSE BLDC GLUCOMTR-MCNC: 185 MG/DL — HIGH (ref 70–99)
GLUCOSE BLDC GLUCOMTR-MCNC: 191 MG/DL — HIGH (ref 70–99)

## 2023-09-26 RX ORDER — CARVEDILOL PHOSPHATE 80 MG/1
12.5 CAPSULE, EXTENDED RELEASE ORAL EVERY 12 HOURS
Refills: 0 | Status: DISCONTINUED | OUTPATIENT
Start: 2023-09-26 | End: 2023-09-27

## 2023-09-26 RX ORDER — SODIUM CHLORIDE 9 MG/ML
1000 INJECTION INTRAMUSCULAR; INTRAVENOUS; SUBCUTANEOUS
Refills: 0 | Status: DISCONTINUED | OUTPATIENT
Start: 2023-09-26 | End: 2023-09-27

## 2023-09-26 RX ADMIN — SENNA PLUS 2 TABLET(S): 8.6 TABLET ORAL at 21:54

## 2023-09-26 RX ADMIN — PIPERACILLIN AND TAZOBACTAM 25 GRAM(S): 4; .5 INJECTION, POWDER, LYOPHILIZED, FOR SOLUTION INTRAVENOUS at 21:51

## 2023-09-26 RX ADMIN — Medication 10 MILLIGRAM(S): at 07:47

## 2023-09-26 RX ADMIN — POLYETHYLENE GLYCOL 3350 17 GRAM(S): 17 POWDER, FOR SOLUTION ORAL at 13:13

## 2023-09-26 RX ADMIN — CARVEDILOL PHOSPHATE 12.5 MILLIGRAM(S): 80 CAPSULE, EXTENDED RELEASE ORAL at 17:40

## 2023-09-26 RX ADMIN — Medication 1: at 18:12

## 2023-09-26 RX ADMIN — HEPARIN SODIUM 5000 UNIT(S): 5000 INJECTION INTRAVENOUS; SUBCUTANEOUS at 05:37

## 2023-09-26 RX ADMIN — PIPERACILLIN AND TAZOBACTAM 25 GRAM(S): 4; .5 INJECTION, POWDER, LYOPHILIZED, FOR SOLUTION INTRAVENOUS at 05:37

## 2023-09-26 RX ADMIN — Medication 3 MILLILITER(S): at 15:51

## 2023-09-26 RX ADMIN — Medication 3 MILLILITER(S): at 04:14

## 2023-09-26 RX ADMIN — PIPERACILLIN AND TAZOBACTAM 25 GRAM(S): 4; .5 INJECTION, POWDER, LYOPHILIZED, FOR SOLUTION INTRAVENOUS at 13:14

## 2023-09-26 RX ADMIN — Medication 81 MILLIGRAM(S): at 13:13

## 2023-09-26 RX ADMIN — TAMSULOSIN HYDROCHLORIDE 0.4 MILLIGRAM(S): 0.4 CAPSULE ORAL at 21:51

## 2023-09-26 RX ADMIN — HEPARIN SODIUM 5000 UNIT(S): 5000 INJECTION INTRAVENOUS; SUBCUTANEOUS at 17:39

## 2023-09-26 RX ADMIN — Medication 3 MILLILITER(S): at 21:20

## 2023-09-26 RX ADMIN — CARVEDILOL PHOSPHATE 6.25 MILLIGRAM(S): 80 CAPSULE, EXTENDED RELEASE ORAL at 05:37

## 2023-09-26 RX ADMIN — CHLORHEXIDINE GLUCONATE 1 APPLICATION(S): 213 SOLUTION TOPICAL at 05:37

## 2023-09-26 RX ADMIN — Medication 3 MILLILITER(S): at 10:45

## 2023-09-26 RX ADMIN — Medication 1: at 09:32

## 2023-09-26 RX ADMIN — ATORVASTATIN CALCIUM 40 MILLIGRAM(S): 80 TABLET, FILM COATED ORAL at 21:54

## 2023-09-26 NOTE — DISCHARGE NOTE PROVIDER - PROVIDER TOKENS
PROVIDER:[TOKEN:[[id number]]] PROVIDER:[TOKEN:[[id number]]] PROVIDER:[TOKEN:[[id number]]]

## 2023-09-26 NOTE — PROGRESS NOTE ADULT - PROBLEM SELECTOR PLAN 1
improved  benign abd exam

## 2023-09-26 NOTE — DISCHARGE NOTE PROVIDER - NSDCMRMEDTOKEN_GEN_ALL_CORE_FT
aspirin 81 mg oral delayed release tablet: 1 tab(s) orally once a day  atorvastatin 40 mg oral tablet: 1 tab(s) orally once a day (at bedtime)  carvedilol 6.25 mg oral tablet: 1 tab(s) orally 2 times a day  Farxiga 10 mg oral tablet: 1 orally once a day  Gemtesa 75 mg oral tablet: 1 orally once a day  glipiZIDE 5 mg oral tablet: 1 orally once a day  lisinopril 20 mg oral tablet: 1 tab(s) orally once a day  tamsulosin 0.4 mg oral capsule: 1 cap(s) orally once a day   acetaminophen 325 mg oral tablet: 2 tab(s) orally every 6 hours As needed Temp greater or equal to 38C (100.4F), Mild Pain (1 - 3)  aspirin 81 mg oral tablet, chewable: 1 tab(s) orally once a day  atorvastatin 40 mg oral tablet: 1 tab(s) orally once a day (at bedtime)  carvedilol 25 mg oral tablet: 1 tab(s) orally 2 times a day  Farxiga 10 mg oral tablet: 1 orally once a day  Gemtesa 75 mg oral tablet: 1 orally once a day  glipiZIDE 5 mg oral tablet: 1 orally once a day  polyethylene glycol 3350 oral powder for reconstitution: 17 gram(s) orally once a day  senna leaf extract oral tablet: 2 tab(s) orally once a day (at bedtime)  tamsulosin 0.4 mg oral capsule: 1 cap(s) orally once a day (at bedtime)   acetaminophen 325 mg oral tablet: 2 tab(s) orally every 6 hours As needed Temp greater or equal to 38C (100.4F), Mild Pain (1 - 3)  aspirin 81 mg oral tablet, chewable: 1 tab(s) orally once a day  atorvastatin 40 mg oral tablet: 1 tab(s) orally once a day (at bedtime)  Coreg 25 mg oral tablet: 1 tab(s) orally 2 times a day  Farxiga 10 mg oral tablet: 1 orally once a day  Gemtesa 75 mg oral tablet: 1 orally once a day  glipiZIDE 5 mg oral tablet: 1 orally once a day  Physical Therapy: Physical Therapy  polyethylene glycol 3350 oral powder for reconstitution: 17 gram(s) orally once a day  senna leaf extract oral tablet: 2 tab(s) orally once a day (at bedtime)  tamsulosin 0.4 mg oral capsule: 1 cap(s) orally once a day (at bedtime)

## 2023-09-26 NOTE — PROGRESS NOTE ADULT - PROBLEM SELECTOR PLAN 3
Hold home oral agents, ISS and glucose checks

## 2023-09-26 NOTE — PROGRESS NOTE ADULT - PROBLEM SELECTOR PLAN 4
C/w home lisinopril, coreg with hold parameters

## 2023-09-26 NOTE — PROGRESS NOTE ADULT - PROBLEM SELECTOR PLAN 5
DVT ppx: HSQ    BPH: c/w tamsulosin
DVT ppx: HSQ    BPH: c/w tamsulosin    BERTA - AS PER RENAL , likely hemodynamically mediated in setting of poor PO intake on ACE-I. Scr increasing. Check UA, urine sodium and urine creatinine. Given h/o BPH and distended abdomen, check renal US. Check LDH/haptoglobin r/o TMA. Trial of IVF. Avoid nephrotoxins. Monitor electrolytes.
DVT ppx: HSQ    BPH: c/w tamsulosin
DVT ppx: HSQ    BPH: c/w tamsulosin    BERTA - AS PER RENAL , likely hemodynamically mediated in setting of poor PO intake on ACE-I. Scr increasing. Check UA, urine sodium and urine creatinine. Given h/o BPH and distended abdomen, check renal US. Check LDH/haptoglobin r/o TMA. Trial of IVF. Avoid nephrotoxins. Monitor electrolytes.
DVT ppx: HSQ    BPH: c/w tamsulosin
DVT ppx: HSQ    BPH: c/w tamsulosin    BERTA - AS PER RENAL , likely hemodynamically mediated in setting of poor PO intake on ACE-I. Scr increasing. Check UA, urine sodium and urine creatinine. Given h/o BPH and distended abdomen, check renal US. Check LDH/haptoglobin r/o TMA. Trial of IVF. Avoid nephrotoxins. Monitor electrolytes.

## 2023-09-26 NOTE — DISCHARGE NOTE PROVIDER - NSDCCPCAREPLAN_GEN_ALL_CORE_FT
PRINCIPAL DISCHARGE DIAGNOSIS  Diagnosis: General weakness  Assessment and Plan of Treatment: You were seen by the physical therapy team while admitted and it was recommended that you be discharged to a subacute rehab facility to work with PT.      SECONDARY DISCHARGE DIAGNOSES  Diagnosis: DM2 (diabetes mellitus, type 2)  Assessment and Plan of Treatment: Your hemoglobin A1C is 7.1%. Target goal for hemoglobin A1C is <7%. Monitor blood glucose levels throughout the day, before meals and at bedtime. Record blood sugars and bring to outpatient provider appointments in order to be reviewed by your doctor for management modifications. If your sugars are more than 400 or less than 70 you should contact your primary care provider immediately. Monitor for signs/symptoms of low blood glucose, such as dizziness, altered mental status, or cool/clammy skin. In addition, monitor for signs/symptoms of high blood glucose, such as feeling hot, dry, fatigued, or with increased thirst/urination. Make regular podiatry appointments in order to have feet checked for wounds and uncontrolled toe nail growth to prevent infections. Make regular ophthalmology appointments to monitor your vision.    Diagnosis: HTN (hypertension)  Assessment and Plan of Treatment: Follow a low sodium/low fat diet, continue your anti-hypertensive medications as prescribed, and follow up with your primary care provider.   Your Lisinopril was held during this admission because you had an acute kidney injury, continue to hold this medication on discharge.    Diagnosis: BERTA (acute kidney injury)  Assessment and Plan of Treatment: You were noted to have an BERTA (acute kidney injury). Your creatinine (kidney function test) improved after receiving intravenous fluids. Follow up with your primary care provider for further monitoring.  Avoid nephrotoxic medications (NSAIDs).    Diagnosis: Constipation  Assessment and Plan of Treatment: You had a CT scan that showed large stool burden. You were started on an bowel regimen. It is recommended that you continue taking these medications to keep your bowels moving regularly.    Diagnosis: Leukocytosis  Assessment and Plan of Treatment: You were noted to have an elevated white blood cell count along with fevers. You were seen by the infectious disease team who treated you with a course of antibiotics.    Diagnosis: Nausea & vomiting  Assessment and Plan of Treatment: You were seen by the speech pathology team who did a swallow evaluation on you and recommended that you continue a pureed diet with moderately thick liquids.    Diagnosis: BPH (benign prostatic hyperplasia)  Assessment and Plan of Treatment: Continue taking Flomax.     PRINCIPAL DISCHARGE DIAGNOSIS  Diagnosis: General weakness  Assessment and Plan of Treatment: You were seen by the physical therapy team while admitted and it was recommended that you be discharged to a subacute rehab facility to work with PT, however, you opted for discharge home. Follow up with PT outpatient on discharge.      SECONDARY DISCHARGE DIAGNOSES  Diagnosis: DM2 (diabetes mellitus, type 2)  Assessment and Plan of Treatment: Your hemoglobin A1C is 7.1%. Target goal for hemoglobin A1C is <7%. Monitor blood glucose levels throughout the day, before meals and at bedtime. Record blood sugars and bring to outpatient provider appointments in order to be reviewed by your doctor for management modifications. If your sugars are more than 400 or less than 70 you should contact your primary care provider immediately. Monitor for signs/symptoms of low blood glucose, such as dizziness, altered mental status, or cool/clammy skin. In addition, monitor for signs/symptoms of high blood glucose, such as feeling hot, dry, fatigued, or with increased thirst/urination. Make regular podiatry appointments in order to have feet checked for wounds and uncontrolled toe nail growth to prevent infections. Make regular ophthalmology appointments to monitor your vision.    Diagnosis: HTN (hypertension)  Assessment and Plan of Treatment: Follow a low sodium/low fat diet, continue your anti-hypertensive medications as prescribed, and follow up with your primary care provider.   Your Lisinopril was held during this admission because you had an acute kidney injury, continue to hold this medication on discharge.    Diagnosis: BERTA (acute kidney injury)  Assessment and Plan of Treatment: You were noted to have an BERTA (acute kidney injury). Your creatinine (kidney function test) improved after receiving intravenous fluids. Follow up with your primary care provider for further monitoring.  Avoid nephrotoxic medications (NSAIDs).    Diagnosis: Constipation  Assessment and Plan of Treatment: You had a CT scan that showed large stool burden. You were started on an bowel regimen. It is recommended that you continue taking these medications to keep your bowels moving regularly.    Diagnosis: Leukocytosis  Assessment and Plan of Treatment: You were noted to have an elevated white blood cell count along with fevers. You were seen by the infectious disease team who treated you with a course of antibiotics.    Diagnosis: Nausea & vomiting  Assessment and Plan of Treatment: You were seen by the speech pathology team who did a swallow evaluation on you and recommended that you continue a pureed diet with moderately thick liquids.    Diagnosis: BPH (benign prostatic hyperplasia)  Assessment and Plan of Treatment: Continue taking Flomax.

## 2023-09-26 NOTE — PROGRESS NOTE ADULT - PROBLEM SELECTOR PROBLEM 2
General weakness

## 2023-09-26 NOTE — DISCHARGE NOTE PROVIDER - CARE PROVIDER_API CALL
Severo Graham  Cardiovascular Disease  [address] [place] [address]  Phone: [phone]  Fax: [phone]  Follow Up Time:    Severo Graham  Cardiovascular Disease  [address] [place] [address]  Phone: [phone]  Fax: [phone]  Follow Up Time:    Severo Graham  Cardiovascular Disease  [address] [place] [address]  Phone: [phone]  Fax: [phone]  Follow Up Time:

## 2023-09-26 NOTE — DISCHARGE NOTE PROVIDER - HOSPITAL COURSE
73M PMH of HTN, T2DM, and CVA presenting due to weakness and vomiting.    Nausea & vomiting  - resolved   - dry mucus membranes; benign abdominal exam  - encourage PO intake  - S&S: pureed w/ moderately thick liquids    Leukocytosis   - resolved  - WBC 20K w/ recurrent fever  - suspect aspiration as pt w/ coughing after eating  - CXR clear, COVID/RVP negative, BCx negative  - ID consulted: c/w zosyn (through 9/27)    Constipation  - CT A/P w/ large stool burden w/ stercoral colitis  - +BM 9/26 --> c/w bowel regimen    General weakness  - non-focal exam  - PT: JAMIN      BERTA  - improving, s/p IVF  - likely hemodynamically mediated i/s/o poor PO intake on ACEi  - bladder scan 6ccs; renal US wnl; FENa low  - nephro consulted: holding lisinopril  - avoid nephrotoxins, renally dose medications  - monitor Cr    T2DM   - A1C 7.1%  - holding home oral agents  - c/w CC diet, ISS, FSG monitoring    HTN   - c/w coreg, increased 9/26  - lisinopril on hold i/s/o BERTA    BPH  - c/w flomax    Case discussed with Dr. Kaur on ___. Patient is medically stable and optimized for discharge as per attending. All medications were reviewed and prescriptions were sent to a mutually agreed upon pharmacy. Discharge plan reviewed with patient and/or family. 73M PMH of HTN, T2DM, and CVA presenting due to weakness and vomiting.    Nausea & vomiting  - resolved   - dry mucus membranes; benign abdominal exam  - encourage PO intake  - S&S: pureed w/ moderately thick liquids    Leukocytosis   - resolved  - WBC 20K w/ recurrent fever  - suspect aspiration as pt w/ coughing after eating  - CXR clear, COVID/RVP negative, BCx negative  - ID consulted: s/p zosyn (through 9/27)    Constipation  - CT A/P w/ large stool burden w/ stercoral colitis  - +BM 9/26 --> c/w bowel regimen    General weakness  - non-focal exam  - PT: JAMIN      BERTA  - improving, s/p IVF  - likely hemodynamically mediated i/s/o poor PO intake on ACEi  - bladder scan 6ccs; renal US wnl; FENa low  - nephro consulted: holding lisinopril  - avoid nephrotoxins, renally dose medications  - monitor Cr    T2DM   - A1C 7.1%  - holding home oral agents  - c/w CC diet, ISS, FSG monitoring    HTN   - c/w coreg, increased 9/26  - lisinopril on hold i/s/o BERTA    BPH  - c/w flomax    Case discussed with Dr. Kaur on ___. Patient is medically stable and optimized for discharge as per attending. All medications were reviewed and prescriptions were sent to a mutually agreed upon pharmacy. Discharge plan reviewed with patient and/or family. 73M PMH of HTN, T2DM, and CVA presenting due to weakness and vomiting.    Nausea & vomiting  - resolved   - dry mucus membranes; benign abdominal exam  - encourage PO intake  - S&S: pureed w/ moderately thick liquids    Leukocytosis   - resolved  - WBC 20K w/ recurrent fever  - suspect aspiration as pt w/ coughing after eating  - CXR clear, COVID/RVP negative, BCx negative  - ID consulted: s/p zosyn (through 9/27)    Constipation  - CT A/P w/ large stool burden w/ stercoral colitis  - +BM 9/27 --> c/w bowel regimen    General weakness  - non-focal exam  - PT: JAMIN      BERTA  - resolved, s/p IVF  - likely hemodynamically mediated i/s/o poor PO intake on ACEi  - bladder scan 6ccs; renal US wnl; FENa low  - nephro consulted: holding lisinopril  - avoid nephrotoxins, renally dose medications  - monitor Cr    T2DM   - A1C 7.1%  - holding home oral agents  - c/w CC diet, ISS, FSG monitoring    HTN   - c/w coreg, increased 9/27  - lisinopril on hold i/s/o BERTA    BPH  - c/w flomax    Case discussed with Dr. Kaur on 9/27. Patient is medically stable and optimized for discharge to [facility] as per attending. Discharge plan reviewed with patient and family. 73M PMH of HTN, T2DM, and CVA presenting due to weakness and vomiting.    Nausea & vomiting  - resolved   - dry mucus membranes; benign abdominal exam  - encourage PO intake  - S&S: pureed w/ moderately thick liquids    Leukocytosis   - resolved  - WBC 20K w/ recurrent fever  - suspect aspiration as pt w/ coughing after eating  - CXR clear, COVID/RVP negative, BCx negative  - ID consulted: s/p zosyn (through 9/27)    Constipation  - CT A/P w/ large stool burden w/ stercoral colitis  - +BM 9/27 --> c/w bowel regimen    General weakness  - non-focal exam  - PT: JAMIN      BERTA  - resolved, s/p IVF  - likely hemodynamically mediated i/s/o poor PO intake on ACEi  - bladder scan 6ccs; renal US wnl; FENa low  - nephro consulted: holding lisinopril  - avoid nephrotoxins, renally dose medications  - monitor Cr    T2DM   - A1C 7.1%  - holding home oral agents  - c/w CC diet, ISS, FSG monitoring    HTN   - c/w coreg, increased 9/27  - lisinopril on hold i/s/o BERTA    BPH  - c/w flomax    Case discussed with Dr. Kaur on 9/27. Patient is medically stable and optimized for discharge to [facility] as per attending. Discharge plan reviewed with patient and family. 73M PMH of HTN, T2DM, and CVA presenting due to weakness and vomiting.    Nausea & vomiting  - resolved   - dry mucus membranes; benign abdominal exam  - encourage PO intake  - S&S: pureed w/ moderately thick liquids    Leukocytosis   - resolved  - WBC 20K w/ recurrent fever  - suspect aspiration as pt w/ coughing after eating  - CXR clear, COVID/RVP negative, BCx negative  - ID consulted: s/p zosyn (through 9/27)    Constipation  - CT A/P w/ large stool burden w/ stercoral colitis  - +BM 9/27 --> c/w bowel regimen    General weakness  - non-focal exam  - PT: JAMIN      BERTA  - resolved, s/p IVF  - likely hemodynamically mediated i/s/o poor PO intake on ACEi  - bladder scan 6ccs; renal US wnl; FENa low  - nephro consulted: holding lisinopril  - avoid nephrotoxins, renally dose medications  - monitor Cr    T2DM   - A1C 7.1%  - holding home oral agents  - c/w CC diet, ISS, FSG monitoring    HTN   - c/w coreg, increased 9/27  - lisinopril on hold i/s/o BERTA    BPH  - c/w flomax    Case discussed with Dr. Kaur on 9/27. Patient is medically stable and optimized for discharge to [facility] as per attending. Discharge plan reviewed with patient and family. 73M PMH of HTN, T2DM, and CVA presenting due to weakness and vomiting.    Nausea & vomiting  - resolved   - dry mucus membranes; benign abdominal exam  - encourage PO intake  - S&S: pureed w/ moderately thick liquids    Leukocytosis   - resolved  - WBC 20K w/ recurrent fever  - suspect aspiration as pt w/ coughing after eating  - CXR clear, COVID/RVP negative, BCx negative  - ID consulted: s/p zosyn (through 9/27)    Constipation  - CT A/P w/ large stool burden w/ stercoral colitis  - +BM 9/27 --> c/w bowel regimen    General weakness  - non-focal exam  - PT: JAMIN  - patient's family opting for home instead    BERTA  - resolved, s/p IVF  - likely hemodynamically mediated i/s/o poor PO intake on ACEi  - bladder scan 6ccs; renal US wnl; FENa low  - nephro consulted: holding lisinopril  - avoid nephrotoxins, renally dose medications  - monitor Cr    T2DM   - A1C 7.1%  - holding home oral agents  - c/w CC diet, ISS, FSG monitoring    HTN   - c/w coreg, increased 9/27  - lisinopril on hold i/s/o BERTA    BPH  - c/w flomax    Case discussed with Dr. Kaur on 9/27. Patient is medically stable and optimized for discharge to [facility] as per attending. Discharge plan reviewed with patient and family. 73M PMH of HTN, T2DM, and CVA presenting due to weakness and vomiting.    Nausea & vomiting  - resolved   - dry mucus membranes; benign abdominal exam  - encourage PO intake  - S&S: pureed w/ moderately thick liquids    Leukocytosis   - resolved  - WBC 20K w/ recurrent fever  - suspect aspiration as pt w/ coughing after eating  - CXR clear, COVID/RVP negative, BCx negative  - ID consulted: s/p zosyn (through 9/27)    Constipation  - CT A/P w/ large stool burden w/ stercoral colitis  - +BM 9/27 --> c/w bowel regimen    General weakness  - non-focal exam  - PT: JAMIN  - patient's family opting for home instead    BERTA  - resolved, s/p IVF  - likely hemodynamically mediated i/s/o poor PO intake on ACEi  - bladder scan 6ccs; renal US wnl; FENa low  - nephro consulted: holding lisinopril  - avoid nephrotoxins, renally dose medications  - monitor Cr    T2DM   - A1C 7.1%  - holding home oral agents  - c/w CC diet, ISS, FSG monitoring    HTN   - c/w coreg, increased 9/27  - lisinopril on hold i/s/o BERTA    BPH  - c/w flomax    Case discussed with Dr. Kaur on 9/27. Patient is medically stable and optimized for discharge to [facility] as per attending. Discharge plan reviewed with patient and family. 73M PMH of HTN, T2DM, and CVA presenting due to weakness and vomiting.    Nausea & vomiting  - resolved   - dry mucus membranes; benign abdominal exam  - encourage PO intake  - S&S: pureed w/ moderately thick liquids    Leukocytosis   - resolved  - WBC 20K w/ recurrent fever  - suspect aspiration as pt w/ coughing after eating  - CXR clear, COVID/RVP negative, BCx negative  - ID consulted: s/p zosyn (through 9/27)    Constipation  - CT A/P w/ large stool burden w/ stercoral colitis  - +BM 9/27 --> c/w bowel regimen    General weakness  - non-focal exam  - PT: JAMIN  - patient's family opting for home instead    BERTA  - resolved, s/p IVF  - likely hemodynamically mediated i/s/o poor PO intake on ACEi  - bladder scan 6ccs; renal US wnl; FENa low  - nephro consulted: holding lisinopril  - avoid nephrotoxins, renally dose medications  - monitor Cr    T2DM   - A1C 7.1%  - holding home oral agents  - c/w CC diet, ISS, FSG monitoring    HTN   - c/w coreg, increased 9/27  - lisinopril on hold i/s/o BERTA    BPH  - c/w flomax    Case discussed with Dr. Kaur on 9/27. Patient is medically stable and optimized for discharge to [facility] as per attending. Discharge plan reviewed with patient and family.

## 2023-09-26 NOTE — PROGRESS NOTE ADULT - PROBLEM SELECTOR PLAN 2
Nonfocal  - PT eval

## 2023-09-27 ENCOUNTER — TRANSCRIPTION ENCOUNTER (OUTPATIENT)
Age: 73
End: 2023-09-27

## 2023-09-27 VITALS
TEMPERATURE: 99 F | HEART RATE: 82 BPM | DIASTOLIC BLOOD PRESSURE: 80 MMHG | OXYGEN SATURATION: 99 % | RESPIRATION RATE: 18 BRPM | SYSTOLIC BLOOD PRESSURE: 154 MMHG

## 2023-09-27 LAB
ANION GAP SERPL CALC-SCNC: 14 MMOL/L — SIGNIFICANT CHANGE UP (ref 7–14)
BUN SERPL-MCNC: 15 MG/DL — SIGNIFICANT CHANGE UP (ref 7–23)
CALCIUM SERPL-MCNC: 9 MG/DL — SIGNIFICANT CHANGE UP (ref 8.4–10.5)
CHLORIDE SERPL-SCNC: 107 MMOL/L — SIGNIFICANT CHANGE UP (ref 98–107)
CO2 SERPL-SCNC: 23 MMOL/L — SIGNIFICANT CHANGE UP (ref 22–31)
CREAT SERPL-MCNC: 1.11 MG/DL — SIGNIFICANT CHANGE UP (ref 0.5–1.3)
EGFR: 70 ML/MIN/1.73M2 — SIGNIFICANT CHANGE UP
GLUCOSE BLDC GLUCOMTR-MCNC: 149 MG/DL — HIGH (ref 70–99)
GLUCOSE BLDC GLUCOMTR-MCNC: 155 MG/DL — HIGH (ref 70–99)
GLUCOSE SERPL-MCNC: 169 MG/DL — HIGH (ref 70–99)
HCT VFR BLD CALC: 38.1 % — LOW (ref 39–50)
HGB BLD-MCNC: 12.4 G/DL — LOW (ref 13–17)
MAGNESIUM SERPL-MCNC: 2 MG/DL — SIGNIFICANT CHANGE UP (ref 1.6–2.6)
MCHC RBC-ENTMCNC: 27.9 PG — SIGNIFICANT CHANGE UP (ref 27–34)
MCHC RBC-ENTMCNC: 32.5 GM/DL — SIGNIFICANT CHANGE UP (ref 32–36)
MCV RBC AUTO: 85.6 FL — SIGNIFICANT CHANGE UP (ref 80–100)
MRSA PCR RESULT.: SIGNIFICANT CHANGE UP
NRBC # BLD: 0 /100 WBCS — SIGNIFICANT CHANGE UP (ref 0–0)
NRBC # FLD: 0 K/UL — SIGNIFICANT CHANGE UP (ref 0–0)
PHOSPHATE SERPL-MCNC: 2.3 MG/DL — LOW (ref 2.5–4.5)
PLATELET # BLD AUTO: 147 K/UL — LOW (ref 150–400)
POTASSIUM SERPL-MCNC: 3.6 MMOL/L — SIGNIFICANT CHANGE UP (ref 3.5–5.3)
POTASSIUM SERPL-SCNC: 3.6 MMOL/L — SIGNIFICANT CHANGE UP (ref 3.5–5.3)
RBC # BLD: 4.45 M/UL — SIGNIFICANT CHANGE UP (ref 4.2–5.8)
RBC # FLD: 13.4 % — SIGNIFICANT CHANGE UP (ref 10.3–14.5)
S AUREUS DNA NOSE QL NAA+PROBE: SIGNIFICANT CHANGE UP
SODIUM SERPL-SCNC: 144 MMOL/L — SIGNIFICANT CHANGE UP (ref 135–145)
WBC # BLD: 7.71 K/UL — SIGNIFICANT CHANGE UP (ref 3.8–10.5)
WBC # FLD AUTO: 7.71 K/UL — SIGNIFICANT CHANGE UP (ref 3.8–10.5)

## 2023-09-27 RX ORDER — SODIUM,POTASSIUM PHOSPHATES 278-250MG
1 POWDER IN PACKET (EA) ORAL
Refills: 0 | Status: COMPLETED | OUTPATIENT
Start: 2023-09-27 | End: 2023-09-27

## 2023-09-27 RX ORDER — CARVEDILOL PHOSPHATE 80 MG/1
1 CAPSULE, EXTENDED RELEASE ORAL
Qty: 60 | Refills: 0
Start: 2023-09-27 | End: 2023-10-26

## 2023-09-27 RX ORDER — ATORVASTATIN CALCIUM 80 MG/1
1 TABLET, FILM COATED ORAL
Qty: 0 | Refills: 0 | DISCHARGE
Start: 2023-09-27

## 2023-09-27 RX ORDER — ASPIRIN/CALCIUM CARB/MAGNESIUM 324 MG
1 TABLET ORAL
Qty: 0 | Refills: 0 | DISCHARGE
Start: 2023-09-27

## 2023-09-27 RX ORDER — CARVEDILOL PHOSPHATE 80 MG/1
1 CAPSULE, EXTENDED RELEASE ORAL
Qty: 0 | Refills: 0 | DISCHARGE
Start: 2023-09-27

## 2023-09-27 RX ORDER — CARVEDILOL PHOSPHATE 80 MG/1
25 CAPSULE, EXTENDED RELEASE ORAL
Refills: 0 | Status: DISCONTINUED | OUTPATIENT
Start: 2023-09-27 | End: 2023-09-27

## 2023-09-27 RX ORDER — POLYETHYLENE GLYCOL 3350 17 G/17G
17 POWDER, FOR SOLUTION ORAL
Qty: 0 | Refills: 0 | DISCHARGE
Start: 2023-09-27

## 2023-09-27 RX ORDER — SENNA PLUS 8.6 MG/1
2 TABLET ORAL
Qty: 0 | Refills: 0 | DISCHARGE
Start: 2023-09-27

## 2023-09-27 RX ORDER — ACETAMINOPHEN 500 MG
2 TABLET ORAL
Qty: 0 | Refills: 0 | DISCHARGE
Start: 2023-09-27

## 2023-09-27 RX ORDER — TAMSULOSIN HYDROCHLORIDE 0.4 MG/1
1 CAPSULE ORAL
Qty: 0 | Refills: 0 | DISCHARGE
Start: 2023-09-27

## 2023-09-27 RX ADMIN — Medication 1 PACKET(S): at 13:09

## 2023-09-27 RX ADMIN — Medication 3 MILLILITER(S): at 09:25

## 2023-09-27 RX ADMIN — Medication 81 MILLIGRAM(S): at 13:22

## 2023-09-27 RX ADMIN — CHLORHEXIDINE GLUCONATE 1 APPLICATION(S): 213 SOLUTION TOPICAL at 05:37

## 2023-09-27 RX ADMIN — PIPERACILLIN AND TAZOBACTAM 25 GRAM(S): 4; .5 INJECTION, POWDER, LYOPHILIZED, FOR SOLUTION INTRAVENOUS at 13:16

## 2023-09-27 RX ADMIN — HEPARIN SODIUM 5000 UNIT(S): 5000 INJECTION INTRAVENOUS; SUBCUTANEOUS at 05:37

## 2023-09-27 RX ADMIN — CARVEDILOL PHOSPHATE 12.5 MILLIGRAM(S): 80 CAPSULE, EXTENDED RELEASE ORAL at 05:47

## 2023-09-27 RX ADMIN — Medication 1 PACKET(S): at 10:33

## 2023-09-27 RX ADMIN — POLYETHYLENE GLYCOL 3350 17 GRAM(S): 17 POWDER, FOR SOLUTION ORAL at 13:10

## 2023-09-27 RX ADMIN — PIPERACILLIN AND TAZOBACTAM 25 GRAM(S): 4; .5 INJECTION, POWDER, LYOPHILIZED, FOR SOLUTION INTRAVENOUS at 05:36

## 2023-09-27 RX ADMIN — Medication 1: at 13:09

## 2023-09-27 RX ADMIN — Medication 3 MILLILITER(S): at 03:54

## 2023-09-27 NOTE — PROGRESS NOTE ADULT - SUBJECTIVE AND OBJECTIVE BOX
Follow Up leucocytosis     Interval History/ROS:   feels better   no nausea  eating well   hungry now      afebrile     daughter at bedside feels patient back to baseline   states patient recently had MRI to evaluate prostate     Allergies  No Known Allergies        ANTIMICROBIALS:      OTHER MEDS:  acetaminophen     Tablet .. 650 milliGRAM(s) Oral every 6 hours PRN  aspirin enteric coated 81 milliGRAM(s) Oral daily  atorvastatin 40 milliGRAM(s) Oral at bedtime  carvedilol 6.25 milliGRAM(s) Oral every 12 hours  chlorhexidine 2% Cloths 1 Application(s) Topical <User Schedule>  dextrose 5% + sodium chloride 0.9%. 1000 milliLiter(s) IV Continuous <Continuous>  dextrose 5%. 1000 milliLiter(s) IV Continuous <Continuous>  dextrose 5%. 1000 milliLiter(s) IV Continuous <Continuous>  dextrose 50% Injectable 12.5 Gram(s) IV Push once  dextrose 50% Injectable 25 Gram(s) IV Push once  dextrose 50% Injectable 25 Gram(s) IV Push once  dextrose Oral Gel 15 Gram(s) Oral once PRN  glucagon  Injectable 1 milliGRAM(s) IntraMuscular once  heparin   Injectable 5000 Unit(s) SubCutaneous every 12 hours  influenza  Vaccine (HIGH DOSE) 0.7 milliLiter(s) IntraMuscular once  insulin lispro (ADMELOG) corrective regimen sliding scale   SubCutaneous at bedtime  insulin lispro (ADMELOG) corrective regimen sliding scale   SubCutaneous three times a day before meals  lisinopril 20 milliGRAM(s) Oral daily  potassium phosphate / sodium phosphate Powder (PHOS-NaK) 1 Packet(s) Oral two times a day  tamsulosin 0.4 milliGRAM(s) Oral at bedtime      Vital Signs Last 24 Hrs  T(C): 37.6 (21 Sep 2023 13:05), Max: 37.6 (21 Sep 2023 13:05)  T(F): 99.6 (21 Sep 2023 13:05), Max: 99.6 (21 Sep 2023 13:05)  HR: 74 (21 Sep 2023 13:05) (74 - 89)  BP: 108/64 (21 Sep 2023 13:05) (108/64 - 164/86)  BP(mean): --  RR: 17 (21 Sep 2023 06:59) (17 - 17)  SpO2: 98% (21 Sep 2023 13:05) (97% - 100%)    Parameters below as of 21 Sep 2023 13:05  Patient On (Oxygen Delivery Method): room air        PHYSICAL EXAM:  Constitutional: No distress  Eyes: No icterus.  Oral cavity: Clear, no lesions  Neck: Supple  RS: Chest clear   CVS: S1, S2   Abdomen: Soft.   : incontinent diaper  Extremities: No pedal edema  Neuro: awake, alert                          15.6   14.49 )-----------( 168      ( 21 Sep 2023 06:40 )             46.5       09-21    139  |  104  |  22  ----------------------------<  185<H>  3.7   |  22  |  1.14    Ca    10.0      21 Sep 2023 06:40  Phos  2.3     09-21  Mg     2.20     09-21    TPro  8.1  /  Alb  4.3  /  TBili  0.6  /  DBili  x   /  AST  22  /  ALT  15  /  AlkPhos  97  09-20      Urinalysis Basic - ( 21 Sep 2023 06:40 )    Color: x / Appearance: x / SG: x / pH: x  Gluc: 185 mg/dL / Ketone: x  / Bili: x / Urobili: x   Blood: x / Protein: x / Nitrite: x   Leuk Esterase: x / RBC: x / WBC x   Sq Epi: x / Non Sq Epi: x / Bacteria: x        MICROBIOLOGY:    Clean Catch Clean Catch (Midstream)  09-20-23   No growth  --  --      .Blood Blood-Peripheral  09-20-23   No growth at 24 hours  --  --      .Blood Blood-Peripheral  09-20-23   No growth at 24 hours  --  --          RADIOLOGY:    
pt was evaluated by hospitalist earlier during the day
  Follow Up leucocytosis     Interval History/ROS:   afebrile  swallow test noted         Allergies  No Known Allergies        ANTIMICROBIALS:  piperacillin/tazobactam IVPB.. 3.375 every 8 hours      MEDICATIONS  (STANDING):  acetaminophen     Tablet .. 650 every 6 hours PRN  aspirin enteric coated 81 daily  atorvastatin 40 at bedtime  carvedilol 6.25 every 12 hours  dextrose 50% Injectable 12.5 once  dextrose 50% Injectable 25 once  dextrose 50% Injectable 25 once  dextrose Oral Gel 15 once PRN  glucagon  Injectable 1 once  heparin   Injectable 5000 every 12 hours  influenza  Vaccine (HIGH DOSE) 0.7 once  insulin lispro (ADMELOG) corrective regimen sliding scale  at bedtime  insulin lispro (ADMELOG) corrective regimen sliding scale  three times a day before meals  lisinopril 20 daily  tamsulosin 0.4 at bedtime      Vital Signs Last 24 Hrs  T(F): 98.7 (09-25-23 @ 13:24), Max: 99.3 (09-24-23 @ 18:26)  HR: 80 (09-25-23 @ 15:55)  BP: 148/58 (09-25-23 @ 13:24)  RR: 18 (09-25-23 @ 06:43)  SpO2: 97% (09-25-23 @ 15:55) (96% - 98%)    PHYSICAL EXAM:  Constitutional: No distress  Eyes: No icterus.  Oral cavity: Clear, no lesions  Neck: Supple  RS: Chest clear   CVS: S1, S2   Abdomen: Soft. non tender  Extremities: No pedal edema  Neuro: awake, alert                                     12.3   10.27 )-----------( 126      ( 25 Sep 2023 06:00 )             38.8 09-25    142  |  104  |  39  ----------------------------<  157  3.8   |  22  |  1.58  Ca    9.3      25 Sep 2023 06:00Phos  2.5     09-25Mg     2.20     09-25        Urine Microscopic-Add On (NC) (09.20.23 @ 03:04)   Cast: 0 /LPF  Epithelial Cells: 0 /HPF  Red Blood Cell - Urine: 0 /HPF  White Blood Cell - Urine: 0 /HPF  Bacteria: Negative /HPF        MICROBIOLOGY:    9/23 blood culture neg     Clean Catch Clean Catch (Midstream)  09-20-23   No growth  --  --      .Blood Blood-Peripheral  09-20-23   No growth   --  --      .Blood Blood-Peripheral  09-20-23   No growth --  --          RADIOLOGY:    
  Follow Up leucocytosis     Interval History/ROS:   febrile last night   son reports patient was coughing a lot      blood culture and cxr done  started zosyn     afebrile today       Allergies  No Known Allergies        ANTIMICROBIALS:  piperacillin/tazobactam IVPB.. 3.375 every 8 hours    MEDICATIONS  (STANDING):  acetaminophen     Tablet .. 650 every 6 hours PRN  aspirin enteric coated 81 daily  atorvastatin 40 at bedtime  carvedilol 6.25 every 12 hours  dextrose 50% Injectable 12.5 once  dextrose 50% Injectable 25 once  dextrose 50% Injectable 25 once  dextrose Oral Gel 15 once PRN  glucagon  Injectable 1 once  heparin   Injectable 5000 every 12 hours  influenza  Vaccine (HIGH DOSE) 0.7 once  insulin lispro (ADMELOG) corrective regimen sliding scale  at bedtime  insulin lispro (ADMELOG) corrective regimen sliding scale  three times a day before meals  lisinopril 20 daily  tamsulosin 0.4 at bedtime      Vital Signs Last 24 Hrs  T(F): 98.1 (09-23-23 @ 12:33), Max: 101.6 (09-22-23 @ 20:58)  HR: 73 (09-23-23 @ 12:33)  BP: 113/61 (09-23-23 @ 12:33)  RR: 18 (09-23-23 @ 12:33)  SpO2: 98% (09-23-23 @ 12:33) (94% - 98%)      PHYSICAL EXAM:  Constitutional: No distress  Eyes: No icterus.  Oral cavity: Clear, no lesions  Neck: Supple  RS: Chest clear   CVS: S1, S2   Abdomen: Soft. non tender  : external cath  Extremities: No pedal edema  Neuro: awake, alert                                     13.8   13.04 )-----------( 140      ( 23 Sep 2023 06:15 )             41.9 09-23    138  |  103  |  31  ----------------------------<  233  3.7   |  23  |  1.73  Ca    9.4      23 Sep 2023 06:15Phos  2.5     09-22Mg     1.90     09-22      Urine Microscopic-Add On (NC) (09.20.23 @ 03:04)   Cast: 0 /LPF  Epithelial Cells: 0 /HPF  Red Blood Cell - Urine: 0 /HPF  White Blood Cell - Urine: 0 /HPF  Bacteria: Negative /HPF        MICROBIOLOGY:    Clean Catch Clean Catch (Midstream)  09-20-23   No growth  --  --      .Blood Blood-Peripheral  09-20-23   No growth at 24 hours  --  --      .Blood Blood-Peripheral  09-20-23   No growth at 24 hours  --  --          RADIOLOGY:    
[facility] NEPHROLOGY- PROGRESS NOTE    73y Male with history of HTN, DM, BPH presents with weakness and vomiting. Nephrology consulted for elevated Scr.    REVIEW OF SYSTEMS: limited as patient [place] but denies any chest pain, dyspnea, nausea, vomiting or diarrhea.     No Known Allergies      Hospital Medications: Medications reviewed        VITALS:  T(F): 98.9 (09-27-23 @ 05:44), Max: 99.5 (09-26-23 @ 21:36)  HR: 81 (09-27-23 @ 09:25)  BP: 171/80 (09-27-23 @ 05:44)  RR: 17 (09-27-23 @ 05:44)  SpO2: 97% (09-27-23 @ 09:25)  Wt(kg): --        PHYSICAL EXAM:    Gen: NAD, calm  Cards: RRR, +S1/S2, no M/G/R  Resp: CTA B/L  GI: soft, NT/ND, NABS  Vascular: no LE edema B/L        LABS:  09-27    144  |  107  |  15  ----------------------------<  169<H>  3.6   |  23  |  1.11    Ca    9.0      27 Sep 2023 05:30  Phos  2.3     09-27  Mg     2.00     09-27      Creatinine Trend: 1.11 <--, 1.58 <--, 1.96 <--, 1.73 <--, 1.15 <--, 1.14 <--                        12.4   7.71  )-----------( 147      ( 27 Sep 2023 05:30 )             38.1     Urine Studies:  Urinalysis Basic - ( 27 Sep 2023 05:30 )    Color:  / Appearance:  / SG:  / pH:   Gluc: 169 mg/dL / Ketone:   / Bili:  / Urobili:    Blood:  / Protein:  / Nitrite:    Leuk Esterase:  / RBC:  / WBC    Sq Epi:  / Non Sq Epi:  / Bacteria:       Creatinine, Random Urine: 102 mg/dL (09-24 @ 20:30)  Sodium, Random Urine: 21 mmol/L (09-24 @ 20:30)  Sodium, Random Urine: 20 mmol/L (09-24 @ 20:30)  Creatinine, Random Urine: 102 mg/dL (09-24 @ 20:30)      
[facility] NEPHROLOGY- PROGRESS NOTE    73y Male with history of HTN, DM, BPH presents with weakness and vomiting. Nephrology consulted for elevated Scr.    REVIEW OF SYSTEMS: limited as patient [place] but denies any chest pain, dyspnea, nausea, vomiting or diarrhea.     No Known Allergies      Hospital Medications: Medications reviewed      VITALS:  T(F): 98.9 (09-26-23 @ 05:39), Max: 99.4 (09-25-23 @ 21:22)  HR: 80 (09-26-23 @ 10:45)  BP: 166/70 (09-26-23 @ 05:39)  RR: 17 (09-26-23 @ 05:39)  SpO2: 98% (09-26-23 @ 10:45)  Wt(kg): --    09-25 @ 07:01  -  09-26 @ 07:00  --------------------------------------------------------  IN: 0 mL / OUT: 400 mL / NET: -400 mL        PHYSICAL EXAM:    Gen: NAD, calm, dry MM  Cards: RRR, +S1/S2, no M/G/R  Resp: CTA B/L  GI: soft, NT/ND, NABS  Vascular: no LE edema B/L      LABS:  09-25    142  |  104  |  39<H>  ----------------------------<  157<H>  3.8   |  22  |  1.58<H>    Ca    9.3      25 Sep 2023 06:00  Phos  2.5     09-25  Mg     2.20     09-25      Creatinine Trend: 1.58 <--, 1.96 <--, 1.73 <--, 1.15 <--, 1.14 <--, 0.93 <--, 1.02 <--                        12.3   10.27 )-----------( 126      ( 25 Sep 2023 06:00 )             38.8     Urine Studies:  Urinalysis Basic - ( 25 Sep 2023 06:00 )    Color:  / Appearance:  / SG:  / pH:   Gluc: 157 mg/dL / Ketone:   / Bili:  / Urobili:    Blood:  / Protein:  / Nitrite:    Leuk Esterase:  / RBC:  / WBC    Sq Epi:  / Non Sq Epi:  / Bacteria:       Creatinine, Random Urine: 102 mg/dL (09-24 @ 20:30)  Sodium, Random Urine: 21 mmol/L (09-24 @ 20:30)  Sodium, Random Urine: 20 mmol/L (09-24 @ 20:30)  Creatinine, Random Urine: 102 mg/dL (09-24 @ 20:30)    
    SUBJECTIVE / OVERNIGHT EVENTS:pt seen and examined  09-25-23     MEDICATIONS  (STANDING):  albuterol/ipratropium for Nebulization 3 milliLiter(s) Nebulizer every 6 hours  aspirin  chewable 81 milliGRAM(s) Oral daily  atorvastatin 40 milliGRAM(s) Oral at bedtime  carvedilol 6.25 milliGRAM(s) Oral every 12 hours  chlorhexidine 2% Cloths 1 Application(s) Topical <User Schedule>  dextrose 5%. 1000 milliLiter(s) (100 mL/Hr) IV Continuous <Continuous>  dextrose 5%. 1000 milliLiter(s) (50 mL/Hr) IV Continuous <Continuous>  dextrose 50% Injectable 25 Gram(s) IV Push once  dextrose 50% Injectable 12.5 Gram(s) IV Push once  dextrose 50% Injectable 25 Gram(s) IV Push once  glucagon  Injectable 1 milliGRAM(s) IntraMuscular once  heparin   Injectable 5000 Unit(s) SubCutaneous every 12 hours  influenza  Vaccine (HIGH DOSE) 0.7 milliLiter(s) IntraMuscular once  insulin lispro (ADMELOG) corrective regimen sliding scale   SubCutaneous three times a day before meals  insulin lispro (ADMELOG) corrective regimen sliding scale   SubCutaneous at bedtime  piperacillin/tazobactam IVPB.. 3.375 Gram(s) IV Intermittent every 8 hours  polyethylene glycol 3350 17 Gram(s) Oral daily  senna 2 Tablet(s) Oral at bedtime  sodium chloride 0.9%. 1000 milliLiter(s) (60 mL/Hr) IV Continuous <Continuous>  tamsulosin 0.4 milliGRAM(s) Oral at bedtime    MEDICATIONS  (PRN):  acetaminophen     Tablet .. 650 milliGRAM(s) Oral every 6 hours PRN Temp greater or equal to 38C (100.4F), Mild Pain (1 - 3)  dextrose Oral Gel 15 Gram(s) Oral once PRN Blood Glucose LESS THAN 70 milliGRAM(s)/deciliter      Vital Signs Last 24 Hrs  T(C): 37.1 (09-25-23 @ 13:24), Max: 37.4 (09-24-23 @ 18:26)  T(F): 98.7 (09-25-23 @ 13:24), Max: 99.3 (09-24-23 @ 18:26)  HR: 77 (09-25-23 @ 13:24) (77 - 92)  BP: 148/58 (09-25-23 @ 13:24) (122/68 - 148/74)  BP(mean): --  RR: 18 (09-25-23 @ 06:43) (18 - 18)  SpO2: 98% (09-25-23 @ 13:24) (96% - 98%)            PHYSICAL EXAM:  GENERAL: NAD  EYES: EOMI, PERRLA  NECK: Supple, No JVD  CHEST/LUNG: dec breath sounds at bases  HEART:  S1 , S2 +  ABDOMEN: soft , bs+  EXTREMITIES:  edema+  NEUROLOGY:alert awake  LABS:  09-25    142  |  104  |  39<H>  ----------------------------<  157<H>  3.8   |  22  |  1.58<H>    Ca    9.3      25 Sep 2023 06:00  Phos  2.5     09-25  Mg     2.20     09-25      Creatinine Trend: 1.58 <--, 1.96 <--, 1.73 <--, 1.15 <--, 1.14 <--, 0.93 <--, 1.02 <--                        12.3   10.27 )-----------( 126      ( 25 Sep 2023 06:00 )             38.8     Urine Studies:  Urinalysis Basic - ( 25 Sep 2023 06:00 )    Color:  / Appearance:  / SG:  / pH:   Gluc: 157 mg/dL / Ketone:   / Bili:  / Urobili:    Blood:  / Protein:  / Nitrite:    Leuk Esterase:  / RBC:  / WBC    Sq Epi:  / Non Sq Epi:  / Bacteria:       Creatinine, Random Urine: 102 mg/dL (09-24 @ 20:30)  Sodium, Random Urine: 21 mmol/L (09-24 @ 20:30)  Sodium, Random Urine: 20 mmol/L (09-24 @ 20:30)  Creatinine, Random Urine: 102 mg/dL (09-24 @ 20:30)                                  RADIOLOGY & ADDITIONAL TESTS:    Imaging Personally Reviewed:yes    Consultant(s) Notes Reviewed:  yes    Care Discussed with Consultants/Other Providers:yes  
[facility] NEPHROLOGY- PROGRESS NOTE    73y Male with history of HTN, DM, BPH presents with weakness and vomiting. Nephrology consulted for elevated Scr.    REVIEW OF SYSTEMS: limited as patient [place] but denies any chest pain, dyspnea, nausea, vomiting or diarrhea.     No Known Allergies      Hospital Medications: Medications reviewed    VITALS:  T(F): 98 (09-25-23 @ 06:43), Max: 99.3 (09-24-23 @ 18:26)  HR: 82 (09-25-23 @ 10:01)  BP: 148/74 (09-25-23 @ 06:43)  RR: 18 (09-25-23 @ 06:43)  SpO2: 97% (09-25-23 @ 06:43)  Wt(kg): --  Height (cm): 162.6 (09-20 @ 22:40)  Weight (kg): 61.3 (09-20 @ 22:40)  BMI (kg/m2): 23.2 (09-20 @ 22:40)  BSA (m2): 1.66 (09-20 @ 22:40)      PHYSICAL EXAM:    Gen: NAD, calm, dry MM  Cards: RRR, +S1/S2, no M/G/R  Resp: CTA B/L  GI: soft, NT/ND, NABS  : + condom catheter  Vascular: no LE edema B/L    LABS:  09-25    142  |  104  |  39<H>  ----------------------------<  157<H>  3.8   |  22  |  1.58<H>    Ca    9.3      25 Sep 2023 06:00  Phos  2.5     09-25  Mg     2.20     09-25      Creatinine Trend: 1.58 <--, 1.96 <--, 1.73 <--, 1.15 <--, 1.14 <--, 0.93 <--, 1.02 <--                        12.3   10.27 )-----------( 126      ( 25 Sep 2023 06:00 )             38.8     Urine Studies:  Urinalysis Basic - ( 25 Sep 2023 06:00 )    Color:  / Appearance:  / SG:  / pH:   Gluc: 157 mg/dL / Ketone:   / Bili:  / Urobili:    Blood:  / Protein:  / Nitrite:    Leuk Esterase:  / RBC:  / WBC    Sq Epi:  / Non Sq Epi:  / Bacteria:       Creatinine, Random Urine: 102 mg/dL (09-24 @ 20:30)  Sodium, Random Urine: 21 mmol/L (09-24 @ 20:30)  Sodium, Random Urine: 20 mmol/L (09-24 @ 20:30)  Creatinine, Random Urine: 102 mg/dL (09-24 @ 20:30)      RADIOLOGY & ADDITIONAL STUDIES:    < from: US Kidney and Bladder (09.24.23 @ 13:50) >  IMPRESSION:  No hydronephrosis or shadowing renal calculi.    --- End of Report ---    < end of copied text >  
    SUBJECTIVE / OVERNIGHT EVENTS:pt seen and examined  23     MEDICATIONS  (STANDING):  albuterol/ipratropium for Nebulization 3 milliLiter(s) Nebulizer every 6 hours  aspirin  chewable 81 milliGRAM(s) Oral daily  atorvastatin 40 milliGRAM(s) Oral at bedtime  carvedilol 6.25 milliGRAM(s) Oral every 12 hours  chlorhexidine 2% Cloths 1 Application(s) Topical <User Schedule>  dextrose 5%. 1000 milliLiter(s) (100 mL/Hr) IV Continuous <Continuous>  dextrose 5%. 1000 milliLiter(s) (50 mL/Hr) IV Continuous <Continuous>  dextrose 50% Injectable 12.5 Gram(s) IV Push once  dextrose 50% Injectable 25 Gram(s) IV Push once  dextrose 50% Injectable 25 Gram(s) IV Push once  glucagon  Injectable 1 milliGRAM(s) IntraMuscular once  heparin   Injectable 5000 Unit(s) SubCutaneous every 12 hours  influenza  Vaccine (HIGH DOSE) 0.7 milliLiter(s) IntraMuscular once  insulin lispro (ADMELOG) corrective regimen sliding scale   SubCutaneous three times a day before meals  insulin lispro (ADMELOG) corrective regimen sliding scale   SubCutaneous at bedtime  piperacillin/tazobactam IVPB.. 3.375 Gram(s) IV Intermittent every 8 hours  polyethylene glycol 3350 17 Gram(s) Oral daily  senna 2 Tablet(s) Oral at bedtime  sodium chloride 0.9%. 1000 milliLiter(s) (60 mL/Hr) IV Continuous <Continuous>  tamsulosin 0.4 milliGRAM(s) Oral at bedtime    MEDICATIONS  (PRN):  acetaminophen     Tablet .. 650 milliGRAM(s) Oral every 6 hours PRN Temp greater or equal to 38C (100.4F), Mild Pain (1 - 3)  dextrose Oral Gel 15 Gram(s) Oral once PRN Blood Glucose LESS THAN 70 milliGRAM(s)/deciliter    Vital Signs Last 24 Hrs  T(C): 37.4 (23 @ 18:26), Max: 37.4 (23 @ 23:22)  T(F): 99.3 (23 @ 18:26), Max: 99.3 (23 @ 23:22)  HR: 84 (23 @ 18:26) (76 - 87)  BP: 122/68 (23 @ 18:26) (108/69 - 122/68)  BP(mean): --  RR: 18 (23 @ 18:26) (18 - 18)  SpO2: 97% (23 @ 18:26) (96% - 97%)          PHYSICAL EXAM:  GENERAL: NAD  EYES: EOMI, PERRLA  NECK: Supple, No JVD  CHEST/LUNG: dec breath sounds at bases  HEART:  S1 , S2 +  ABDOMEN: soft , bs+  EXTREMITIES:  edema+  NEUROLOGY:alert awake    LABS:      141  |  102  |  44<H>  ----------------------------<  154<H>  3.7   |  22  |  1.96<H>    Ca    9.4      24 Sep 2023 05:05      Creatinine Trend: 1.96 <--, 1.73 <--, 1.15 <--, 1.14 <--, 0.93 <--, 1.02 <--                        13.1   12.55 )-----------( 123      ( 24 Sep 2023 05:05 )             40.8     Urine Studies:  Urinalysis Basic - ( 24 Sep 2023 20:30 )    Color: Yellow / Appearance: Cloudy / S.032 / pH:   Gluc:  / Ketone: Negative mg/dL  / Bili: Negative / Urobili: 1.0 mg/dL   Blood:  / Protein: 30 mg/dL / Nitrite: Negative   Leuk Esterase: Negative / RBC: 1 /HPF / WBC 2 /HPF   Sq Epi:  / Non Sq Epi: 3 /HPF / Bacteria: Negative /HPF      Creatinine, Random Urine: 102 mg/dL ( @ 20:30)  Sodium, Random Urine: 21 mmol/L ( @ 20:30)  Sodium, Random Urine: 20 mmol/L ( @ 20:30)  Creatinine, Random Urine: 102 mg/dL ( @ 20:30)                                  RADIOLOGY & ADDITIONAL TESTS:    Imaging Personally Reviewed:yes    Consultant(s) Notes Reviewed:  yes    Care Discussed with Consultants/Other Providers:yes  
    SUBJECTIVE / OVERNIGHT EVENTS:pt seen and examined  09-23-23     MEDICATIONS  (STANDING):  aspirin enteric coated 81 milliGRAM(s) Oral daily  atorvastatin 40 milliGRAM(s) Oral at bedtime  carvedilol 6.25 milliGRAM(s) Oral every 12 hours  chlorhexidine 2% Cloths 1 Application(s) Topical <User Schedule>  dextrose 5%. 1000 milliLiter(s) (100 mL/Hr) IV Continuous <Continuous>  dextrose 5%. 1000 milliLiter(s) (50 mL/Hr) IV Continuous <Continuous>  dextrose 50% Injectable 12.5 Gram(s) IV Push once  dextrose 50% Injectable 25 Gram(s) IV Push once  dextrose 50% Injectable 25 Gram(s) IV Push once  glucagon  Injectable 1 milliGRAM(s) IntraMuscular once  heparin   Injectable 5000 Unit(s) SubCutaneous every 12 hours  influenza  Vaccine (HIGH DOSE) 0.7 milliLiter(s) IntraMuscular once  insulin lispro (ADMELOG) corrective regimen sliding scale   SubCutaneous at bedtime  insulin lispro (ADMELOG) corrective regimen sliding scale   SubCutaneous three times a day before meals  lisinopril 20 milliGRAM(s) Oral daily  piperacillin/tazobactam IVPB.. 3.375 Gram(s) IV Intermittent every 8 hours  tamsulosin 0.4 milliGRAM(s) Oral at bedtime    MEDICATIONS  (PRN):  acetaminophen     Tablet .. 650 milliGRAM(s) Oral every 6 hours PRN Temp greater or equal to 38C (100.4F), Mild Pain (1 - 3)  dextrose Oral Gel 15 Gram(s) Oral once PRN Blood Glucose LESS THAN 70 milliGRAM(s)/deciliter    Vital Signs Last 24 Hrs  T(C): 36.7 (09-23-23 @ 12:33), Max: 37 (09-23-23 @ 01:45)  T(F): 98.1 (09-23-23 @ 12:33), Max: 98.6 (09-23-23 @ 01:45)  HR: 97 (09-23-23 @ 17:23) (73 - 97)  BP: 122/67 (09-23-23 @ 17:23) (107/67 - 122/67)  BP(mean): --  RR: 18 (09-23-23 @ 17:23) (18 - 22)  SpO2: 96% (09-23-23 @ 17:23) (96% - 98%)        PHYSICAL EXAM:  GENERAL: NAD  EYES: EOMI, PERRLA  NECK: Supple, No JVD  CHEST/LUNG: dec breath sounds at bases  HEART:  S1 , S2 +  ABDOMEN: soft , bs+  EXTREMITIES:  edema+  NEUROLOGY:alert awake      LABS:  09-23    138  |  103  |  31<H>  ----------------------------<  233<H>  3.7   |  23  |  1.73<H>    Ca    9.4      23 Sep 2023 06:15  Phos  2.5     09-22  Mg     1.90     09-22      Creatinine Trend: 1.73 <--, 1.15 <--, 1.14 <--, 0.93 <--, 1.02 <--                        13.8   13.04 )-----------( 140      ( 23 Sep 2023 06:15 )             41.9     Urine Studies:  Urinalysis Basic - ( 23 Sep 2023 06:15 )    Color:  / Appearance:  / SG:  / pH:   Gluc: 233 mg/dL / Ketone:   / Bili:  / Urobili:    Blood:  / Protein:  / Nitrite:    Leuk Esterase:  / RBC:  / WBC    Sq Epi:  / Non Sq Epi:  / Bacteria:                         RADIOLOGY & ADDITIONAL TESTS:    Imaging Personally Reviewed:yes    Consultant(s) Notes Reviewed:  yes    Care Discussed with Consultants/Other Providers:yes  
    SUBJECTIVE / OVERNIGHT EVENTS:pt seen and examined  09-22-23     MEDICATIONS  (STANDING):  aspirin enteric coated 81 milliGRAM(s) Oral daily  atorvastatin 40 milliGRAM(s) Oral at bedtime  carvedilol 6.25 milliGRAM(s) Oral every 12 hours  chlorhexidine 2% Cloths 1 Application(s) Topical <User Schedule>  dextrose 5%. 1000 milliLiter(s) (100 mL/Hr) IV Continuous <Continuous>  dextrose 5%. 1000 milliLiter(s) (50 mL/Hr) IV Continuous <Continuous>  dextrose 50% Injectable 12.5 Gram(s) IV Push once  dextrose 50% Injectable 25 Gram(s) IV Push once  dextrose 50% Injectable 25 Gram(s) IV Push once  glucagon  Injectable 1 milliGRAM(s) IntraMuscular once  heparin   Injectable 5000 Unit(s) SubCutaneous every 12 hours  influenza  Vaccine (HIGH DOSE) 0.7 milliLiter(s) IntraMuscular once  insulin lispro (ADMELOG) corrective regimen sliding scale   SubCutaneous at bedtime  insulin lispro (ADMELOG) corrective regimen sliding scale   SubCutaneous three times a day before meals  lisinopril 20 milliGRAM(s) Oral daily  tamsulosin 0.4 milliGRAM(s) Oral at bedtime    MEDICATIONS  (PRN):  acetaminophen     Tablet .. 650 milliGRAM(s) Oral every 6 hours PRN Temp greater or equal to 38C (100.4F), Mild Pain (1 - 3)  dextrose Oral Gel 15 Gram(s) Oral once PRN Blood Glucose LESS THAN 70 milliGRAM(s)/deciliter    Vital Signs Last 24 Hrs  T(C): 38.7 (09-22-23 @ 20:58), Max: 38.7 (09-22-23 @ 20:58)  T(F): 101.6 (09-22-23 @ 20:58), Max: 101.6 (09-22-23 @ 20:58)  HR: 111 (09-22-23 @ 20:58) (61 - 111)  BP: 125/92 (09-22-23 @ 20:58) (125/92 - 159/68)  BP(mean): --  RR: 22 (09-22-23 @ 20:58) (17 - 22)  SpO2: 94% (09-22-23 @ 20:58) (94% - 99%)      PHYSICAL EXAM:  GENERAL: NAD  EYES: EOMI, PERRLA  NECK: Supple, No JVD  CHEST/LUNG: dec breath sounds at bases  HEART:  S1 , S2 +  ABDOMEN: soft , bs+  EXTREMITIES:  edema+  NEUROLOGY:alert awake      LABS:  09-22    135  |  103  |  23  ----------------------------<  259<H>  4.3   |  18<L>  |  1.15    Ca    9.5      22 Sep 2023 15:00  Phos  2.5     09-22  Mg     1.90     09-22      Creatinine Trend: 1.15 <--, 1.14 <--, 0.93 <--, 1.02 <--                        15.5   13.68 )-----------( 140      ( 22 Sep 2023 15:00 )             47.5     Urine Studies:  Urinalysis Basic - ( 22 Sep 2023 15:00 )    Color:  / Appearance:  / SG:  / pH:   Gluc: 259 mg/dL / Ketone:   / Bili:  / Urobili:    Blood:  / Protein:  / Nitrite:    Leuk Esterase:  / RBC:  / WBC    Sq Epi:  / Non Sq Epi:  / Bacteria:                   Leuk Esterase: x / RBC: x / WBC x   Sq Epi: x / Non Sq Epi: x / Bacteria: x        RADIOLOGY & ADDITIONAL TESTS:    Imaging Personally Reviewed:yes    Consultant(s) Notes Reviewed:  yes    Care Discussed with Consultants/Other Providers:yes  
    SUBJECTIVE / OVERNIGHT EVENTS:pt seen and examined  09-26-23     MEDICATIONS  (STANDING):  albuterol/ipratropium for Nebulization 3 milliLiter(s) Nebulizer every 6 hours  aspirin  chewable 81 milliGRAM(s) Oral daily  atorvastatin 40 milliGRAM(s) Oral at bedtime  carvedilol 12.5 milliGRAM(s) Oral every 12 hours  chlorhexidine 2% Cloths 1 Application(s) Topical <User Schedule>  dextrose 5%. 1000 milliLiter(s) (50 mL/Hr) IV Continuous <Continuous>  dextrose 5%. 1000 milliLiter(s) (100 mL/Hr) IV Continuous <Continuous>  dextrose 50% Injectable 25 Gram(s) IV Push once  dextrose 50% Injectable 12.5 Gram(s) IV Push once  dextrose 50% Injectable 25 Gram(s) IV Push once  glucagon  Injectable 1 milliGRAM(s) IntraMuscular once  heparin   Injectable 5000 Unit(s) SubCutaneous every 12 hours  influenza  Vaccine (HIGH DOSE) 0.7 milliLiter(s) IntraMuscular once  insulin lispro (ADMELOG) corrective regimen sliding scale   SubCutaneous three times a day before meals  insulin lispro (ADMELOG) corrective regimen sliding scale   SubCutaneous at bedtime  piperacillin/tazobactam IVPB.. 3.375 Gram(s) IV Intermittent every 8 hours  polyethylene glycol 3350 17 Gram(s) Oral daily  senna 2 Tablet(s) Oral at bedtime  sodium chloride 0.9%. 1000 milliLiter(s) (50 mL/Hr) IV Continuous <Continuous>  tamsulosin 0.4 milliGRAM(s) Oral at bedtime    MEDICATIONS  (PRN):  acetaminophen     Tablet .. 650 milliGRAM(s) Oral every 6 hours PRN Temp greater or equal to 38C (100.4F), Mild Pain (1 - 3)  dextrose Oral Gel 15 Gram(s) Oral once PRN Blood Glucose LESS THAN 70 milliGRAM(s)/deciliter    Vital Signs Last 24 Hrs  T(C): 37.5 (09-26-23 @ 21:36), Max: 37.5 (09-26-23 @ 21:36)  T(F): 99.5 (09-26-23 @ 21:36), Max: 99.5 (09-26-23 @ 21:36)  HR: 77 (09-26-23 @ 21:36) (75 - 87)  BP: 168/85 (09-26-23 @ 21:36) (149/91 - 168/85)  BP(mean): --  RR: 18 (09-26-23 @ 21:36) (17 - 18)  SpO2: 99% (09-26-23 @ 21:36) (95% - 99%)              PHYSICAL EXAM:  GENERAL: NAD  EYES: EOMI, PERRLA  NECK: Supple, No JVD  CHEST/LUNG: dec breath sounds at bases  HEART:  S1 , S2 +  ABDOMEN: soft , bs+  EXTREMITIES:  edema+  NEUROLOGY:alert awake    LABS:  09-25    142  |  104  |  39<H>  ----------------------------<  157<H>  3.8   |  22  |  1.58<H>    Ca    9.3      25 Sep 2023 06:00  Phos  2.5     09-25  Mg     2.20     09-25      Creatinine Trend: 1.58 <--, 1.96 <--, 1.73 <--, 1.15 <--, 1.14 <--, 0.93 <--, 1.02 <--                        12.3   10.27 )-----------( 126      ( 25 Sep 2023 06:00 )             38.8     Urine Studies:  Urinalysis Basic - ( 25 Sep 2023 06:00 )    Color:  / Appearance:  / SG:  / pH:   Gluc: 157 mg/dL / Ketone:   / Bili:  / Urobili:    Blood:  / Protein:  / Nitrite:    Leuk Esterase:  / RBC:  / WBC    Sq Epi:  / Non Sq Epi:  / Bacteria:       Creatinine, Random Urine: 102 mg/dL (09-24 @ 20:30)  Sodium, Random Urine: 21 mmol/L (09-24 @ 20:30)  Sodium, Random Urine: 20 mmol/L (09-24 @ 20:30)  Creatinine, Random Urine: 102 mg/dL (09-24 @ 20:30)                RADIOLOGY & ADDITIONAL TESTS:    Imaging Personally Reviewed:yes    Consultant(s) Notes Reviewed:  yes    Care Discussed with Consultants/Other Providers:yes  
    SUBJECTIVE / OVERNIGHT EVENTS:pt seen and examined  09-21-23     MEDICATIONS  (STANDING):  aspirin enteric coated 81 milliGRAM(s) Oral daily  atorvastatin 40 milliGRAM(s) Oral at bedtime  carvedilol 6.25 milliGRAM(s) Oral every 12 hours  chlorhexidine 2% Cloths 1 Application(s) Topical <User Schedule>  dextrose 5% + sodium chloride 0.9%. 1000 milliLiter(s) (50 mL/Hr) IV Continuous <Continuous>  dextrose 5%. 1000 milliLiter(s) (50 mL/Hr) IV Continuous <Continuous>  dextrose 5%. 1000 milliLiter(s) (100 mL/Hr) IV Continuous <Continuous>  dextrose 50% Injectable 12.5 Gram(s) IV Push once  dextrose 50% Injectable 25 Gram(s) IV Push once  dextrose 50% Injectable 25 Gram(s) IV Push once  glucagon  Injectable 1 milliGRAM(s) IntraMuscular once  heparin   Injectable 5000 Unit(s) SubCutaneous every 12 hours  influenza  Vaccine (HIGH DOSE) 0.7 milliLiter(s) IntraMuscular once  insulin lispro (ADMELOG) corrective regimen sliding scale   SubCutaneous at bedtime  insulin lispro (ADMELOG) corrective regimen sliding scale   SubCutaneous three times a day before meals  lisinopril 20 milliGRAM(s) Oral daily  tamsulosin 0.4 milliGRAM(s) Oral at bedtime    MEDICATIONS  (PRN):  acetaminophen     Tablet .. 650 milliGRAM(s) Oral every 6 hours PRN Temp greater or equal to 38C (100.4F), Mild Pain (1 - 3)  dextrose Oral Gel 15 Gram(s) Oral once PRN Blood Glucose LESS THAN 70 milliGRAM(s)/deciliter    T(C): 37.6 (09-21-23 @ 13:05), Max: 37.6 (09-21-23 @ 13:05)  HR: 78 (09-21-23 @ 18:00) (74 - 88)  BP: 158/72 (09-21-23 @ 18:00) (108/64 - 158/72)  RR: 17 (09-21-23 @ 06:59) (17 - 17)  SpO2: 98% (09-21-23 @ 13:05) (97% - 98%)    CAPILLARY BLOOD GLUCOSE      POCT Blood Glucose.: 170 mg/dL (21 Sep 2023 21:58)  POCT Blood Glucose.: 155 mg/dL (21 Sep 2023 17:52)  POCT Blood Glucose.: 137 mg/dL (21 Sep 2023 12:17)  POCT Blood Glucose.: 178 mg/dL (21 Sep 2023 09:30)  POCT Blood Glucose.: 172 mg/dL (21 Sep 2023 08:15)    I&O's Summary    PHYSICAL EXAM:  GENERAL: NAD  EYES: EOMI, PERRLA  NECK: Supple, No JVD  CHEST/LUNG: dec breath sounds at bases  HEART:  S1 , S2 +  ABDOMEN: soft , bs+  EXTREMITIES:  edema+  NEUROLOGY:alert awake      LABS:                        15.6   14.49 )-----------( 168      ( 21 Sep 2023 06:40 )             46.5     09-21    139  |  104  |  22  ----------------------------<  185<H>  3.7   |  22  |  1.14    Ca    10.0      21 Sep 2023 06:40  Phos  2.3     09-21  Mg     2.20     09-21    TPro  8.1  /  Alb  4.3  /  TBili  0.6  /  DBili  x   /  AST  22  /  ALT  15  /  AlkPhos  97  09-20    PT/INR - ( 20 Sep 2023 03:04 )   PT: 12.2 sec;   INR: 1.09 ratio         PTT - ( 20 Sep 2023 03:04 )  PTT:28.7 sec      Urinalysis Basic - ( 21 Sep 2023 06:40 )    Color: x / Appearance: x / SG: x / pH: x  Gluc: 185 mg/dL / Ketone: x  / Bili: x / Urobili: x   Blood: x / Protein: x / Nitrite: x   Leuk Esterase: x / RBC: x / WBC x   Sq Epi: x / Non Sq Epi: x / Bacteria: x        RADIOLOGY & ADDITIONAL TESTS:    Imaging Personally Reviewed:yes    Consultant(s) Notes Reviewed:  yes    Care Discussed with Consultants/Other Providers:yes

## 2023-09-27 NOTE — PROGRESS NOTE ADULT - REASON FOR ADMISSION
Nausea, vomiting, general weakness

## 2023-09-27 NOTE — PROGRESS NOTE ADULT - PROVIDER SPECIALTY LIST ADULT
Infectious Disease
Nephrology
Infectious Disease
Internal Medicine
Infectious Disease
Internal Medicine
Nephrology
Nephrology
Internal Medicine

## 2023-09-27 NOTE — DISCHARGE NOTE NURSING/CASE MANAGEMENT/SOCIAL WORK - PATIENT PORTAL LINK FT
You can access the FollowMyHealth Patient Portal offered by [facility] by registering at the following website: [email or web address]. By joining [employer]’s FollowMyHealth portal, you will also be able to view your health information using other applications (apps) compatible with our system.

## 2023-09-27 NOTE — PROGRESS NOTE ADULT - ASSESSMENT
73y Male with history of HTN, DM, BPH presents with weakness and vomiting. Nephrology consulted for elevated Scr.    1) BERTA: likely hemodynamically mediated in setting of poor PO intake on ACE-I. Scr improving s/p IVF. Gentle IVF today as patient appears dry. UA bland. FeNa low. Renal US without hydronephrosis. TMA work up negative. Avoid nephrotoxins. Monitor electrolytes.    2) HTN: BP controlled. Holding lisinopril given BERTA. Monitor BP.    3) BPH without LUTS: Continue with flomax.    4) Fevers: On Zosyn. As per [facility] NEPHROLOGY  Alessandro Arellano M.D.  Roel Blackmon D.O.  Kristine Calvin M.D.  MD Marta Hensley, MSN, ANP-C    Telephone: [phone]  Facsimile: [phone]    [address] [place] [address], #[place] [address]  
73y Male with history of HTN, DM, BPH presents with weakness and vomiting. Nephrology consulted for elevated Scr.    1) BERTA: likely hemodynamically mediated in setting of poor PO intake on ACE-I. Scr improving s/p IVF. Gentle IVF today as patient appears dry. UA bland. FeNa low. Renal US without hydronephrosis. TMA work up negative. Avoid nephrotoxins. Monitor electrolytes.    2) HTN: BP uncontrolled. Increase coreg to 12.5 mg twice daily. Holding lisinopril given BERTA. Monitor BP.    3) BPH without LUTS: Continue with flomax.    4) Fevers: On Zosyn. As per ID.    [facility] NEPHROLOGY  Alessandro Arellano M.D.  Roel Blackmon D.O.  Kristine Calvin M.D.  MD Marta Hensley, MSN, ANP-C    Telephone: [phone]  Facsimile: [phone] [place] [address], [address]  [place] [address]  
72 yo man with diabetes, h/o covid, h/o CVA 2021 who developed nausea and vomiting 9/19 found to have electrolytes abnormalities and leucocytosis.   Feeling well now; no longer nauseous, hungry, eating food.  No fever, chills, dysuria, diarrhea.   Afebrile.   No localizing signs of infection on exam.  UA unremarkable.  C x R clear   covid, flu, RSV PCR negative   WBC 20K  --> 14K off antibiotics     Leucocytosis unclear whether due to infection or reactive to other process   No obvious focus of infection on exam    Clinically looks stronger today   WBC trending toward normal w/o antibiotics       Suggest;  monitor temp, wbc   would continue to observe off antibiotics for now     will follow 
72 yo M with DM, HTN, CVA (apx [id number]) w/o residual deficits presented with 1 day of nausea, vomiting along with generalized weakness, admitted for further evaluation.
72 yo man with diabetes, h/o covid, h/o CVA 2021 who developed nausea and vomiting 9/19 found to have electrolytes abnormalities and leucocytosis.   UA unremarkable.  C x R clear   covid, flu, RSV PCR negative   WBC 20K on admission  --> 14K off antibiotics then spiked fever 101.6 on 9/22 after coughing after eating      fever and leucocytosis   suspect aspiration   Zosyn 9/22-  afebrile since   non toxic   blood cultures 9/20 and 9/23 no growth   CXR clear        Suggest;    continue zosyn --> 9/27  if d/c prior can switch to augmentin po     
73y Male with history of HTN, DM, BPH presents with weakness and vomiting. Nephrology consulted for elevated Scr.    1) BERTA: likely hemodynamically mediated in setting of poor PO intake on ACE-I. BERTA resolved s/p IVF. Encourage PO intake. UA bland. FeNa low. Renal US without hydronephrosis. TMA work up negative. Avoid nephrotoxins. Monitor electrolytes.    2) HTN: BP uncontrolled. Increase coreg to 25 mg twice daily. Will restart lisinopril if BP remains uncontrolled. Monitor BP.    3) BPH without LUTS: Continue with flomax.    4) Fevers: On Zosyn. As per ID.    [facility] NEPHROLOGY  Alessandro Arellano M.D.  Roel Blackmon D.O.  Kristine Calvin M.D.  MD Marta Hensley, MSN, ANP-C    Telephone: [phone]  Facsimile: [phone]    [facility] [place] [address], [address]  [place] [address]  
74 yo man with diabetes, h/o covid, h/o CVA 2021 who developed nausea and vomiting 9/19 found to have electrolytes abnormalities and leucocytosis.   No localizing signs of infection on exam.  UA unremarkable.  C x R clear   covid, flu, RSV PCR negative   WBC 20K on admission  --> 14K off antibiotics then spiked fever 101.6 on 9/22     fever and leucocytosis   suspect aspiration   afebrile today  appears comfortable     Suggest;    follow blood culture - sent last night   continue zosyn   if continues febrile CT chest/abd  
72 yo M with DM, HTN, CVA (apx [id number]) w/o residual deficits presented with 1 day of nausea, vomiting along with generalized weakness, admitted for further evaluation.
74 yo M with DM, HTN, CVA (apx [id number]) w/o residual deficits presented with 1 day of nausea, vomiting along with generalized weakness, admitted for further evaluation.
74 yo M with DM, HTN, CVA (apx [id number]) w/o residual deficits presented with 1 day of nausea, vomiting along with generalized weakness, admitted for further evaluation.
72 yo M with DM, HTN, CVA (apx [id number]) w/o residual deficits presented with 1 day of nausea, vomiting along with generalized weakness, admitted for further evaluation.
72 yo M with DM, HTN, CVA (apx [id number]) w/o residual deficits presented with 1 day of nausea, vomiting along with generalized weakness, admitted for further evaluation.

## 2023-09-27 NOTE — CHART NOTE - NSCHARTNOTEFT_GEN_A_CORE
Spoke w/ patient's son, Severo, via telephone, and updated with current plan of care. All other medical questions were answered to the best of my ability and teach-back offered with demonstrated understanding.
Discussed plan of care with patient at bedside using RSVP Law  #[id number]. Discussed with patient's son, Severo, via telephone, and updated with plan of care. Severo opting to take his father home on discharge, requesting that medications be sent to [facility]. Patient and his son in agreement with plan for discharge home today.
Pt febrile this evening, T100.4, Pt w/leukocytosis on admission, has been trending, 19K--->13K. Infectious w/u thus far neg. Discussed with ID Dr. Haines, favors to continue to monitor off abx, repeat CXR. Spoke with Dr. Kaur, to wants to start empirical Zosyn. Zosyn started, ID to f/u tomorrow.

## 2023-09-28 LAB
CULTURE RESULTS: SIGNIFICANT CHANGE UP
SPECIMEN SOURCE: SIGNIFICANT CHANGE UP

## 2023-10-31 ENCOUNTER — APPOINTMENT (OUTPATIENT)
Dept: OTOLARYNGOLOGY | Facility: CLINIC | Age: 73
End: 2023-10-31

## 2023-11-20 ENCOUNTER — APPOINTMENT (OUTPATIENT)
Dept: OTOLARYNGOLOGY | Facility: CLINIC | Age: 73
End: 2023-11-20

## 2023-11-20 ENCOUNTER — OUTPATIENT (OUTPATIENT)
Dept: OUTPATIENT SERVICES | Facility: HOSPITAL | Age: 73
LOS: 1 days | Discharge: HOME | End: 2023-11-20

## 2023-11-20 ENCOUNTER — APPOINTMENT (OUTPATIENT)
Dept: OTOLARYNGOLOGY | Facility: CLINIC | Age: 73
End: 2023-11-20
Payer: MEDICARE

## 2023-11-20 VITALS
WEIGHT: 129.25 LBS | DIASTOLIC BLOOD PRESSURE: 94 MMHG | SYSTOLIC BLOOD PRESSURE: 181 MMHG | BODY MASS INDEX: 22.9 KG/M2 | HEIGHT: 62.99 IN | HEART RATE: 69 BPM

## 2023-11-20 DIAGNOSIS — Z87.891 PERSONAL HISTORY OF NICOTINE DEPENDENCE: ICD-10-CM

## 2023-11-20 DIAGNOSIS — Z84.89 FAMILY HISTORY OF OTHER SPECIFIED CONDITIONS: ICD-10-CM

## 2023-11-20 DIAGNOSIS — E11.628 TYPE 2 DIABETES MELLITUS WITH OTHER SKIN COMPLICATIONS: ICD-10-CM

## 2023-11-20 DIAGNOSIS — Z86.39 PERSONAL HISTORY OF OTHER ENDOCRINE, NUTRITIONAL AND METABOLIC DISEASE: ICD-10-CM

## 2023-11-20 DIAGNOSIS — H90.3 SENSORINEURAL HEARING LOSS, BILATERAL: ICD-10-CM

## 2023-11-20 DIAGNOSIS — Z86.79 PERSONAL HISTORY OF OTHER DISEASES OF THE CIRCULATORY SYSTEM: ICD-10-CM

## 2023-11-20 DIAGNOSIS — H61.23 IMPACTED CERUMEN, BILATERAL: ICD-10-CM

## 2023-11-20 DIAGNOSIS — H93.293 OTHER ABNORMAL AUDITORY PERCEPTIONS, BILATERAL: ICD-10-CM

## 2023-11-20 PROCEDURE — 92567 TYMPANOMETRY: CPT

## 2023-11-20 PROCEDURE — 92557 COMPREHENSIVE HEARING TEST: CPT

## 2023-11-20 PROCEDURE — 99203 OFFICE O/P NEW LOW 30 MIN: CPT | Mod: 25

## 2023-11-20 RX ORDER — CARVEDILOL 25 MG/1
25 TABLET, FILM COATED ORAL
Refills: 0 | Status: ACTIVE | COMMUNITY

## 2023-11-20 RX ORDER — GLYBURIDE 5 MG/1
5 TABLET ORAL
Refills: 0 | Status: ACTIVE | COMMUNITY

## 2023-11-20 RX ORDER — GLIPIZIDE 5 MG/1
5 TABLET ORAL
Refills: 0 | Status: ACTIVE | COMMUNITY

## 2023-11-20 RX ORDER — ATORVASTATIN CALCIUM 40 MG/1
40 TABLET, FILM COATED ORAL
Refills: 0 | Status: ACTIVE | COMMUNITY

## 2023-11-20 RX ORDER — ASPIRIN 81 MG/1
81 TABLET ORAL
Refills: 0 | Status: ACTIVE | COMMUNITY

## 2023-11-20 RX ORDER — TAMSULOSIN HYDROCHLORIDE 0.4 MG/1
0.4 CAPSULE ORAL
Refills: 0 | Status: ACTIVE | COMMUNITY

## 2023-11-20 RX ORDER — DAPAGLIFLOZIN 10 MG/1
10 TABLET, FILM COATED ORAL
Refills: 0 | Status: ACTIVE | COMMUNITY

## 2023-11-20 RX ORDER — LISINOPRIL 10 MG/1
10 TABLET ORAL
Refills: 0 | Status: ACTIVE | COMMUNITY

## 2023-11-20 RX ORDER — VIBEGRON 75 MG/1
75 TABLET, FILM COATED ORAL
Refills: 0 | Status: ACTIVE | COMMUNITY

## 2023-11-28 DIAGNOSIS — H93.293 OTHER ABNORMAL AUDITORY PERCEPTIONS, BILATERAL: ICD-10-CM

## 2023-11-28 DIAGNOSIS — H90.3 SENSORINEURAL HEARING LOSS, BILATERAL: ICD-10-CM

## 2023-11-28 DIAGNOSIS — H61.23 IMPACTED CERUMEN, BILATERAL: ICD-10-CM

## 2024-03-13 NOTE — DISCHARGE NOTE NURSING/CASE MANAGEMENT/SOCIAL WORK - NSDCPEPTSTRK_GEN_ALL_CORE
Developmental Pediatrics & Behavioral Health Phone Intake    Insurance at time of call:  [facility]     Interviewee (full name): Jagruti Kramer   Relationship to Child: Biological mother    Other Legal Guardian(s):   Name: Denied     Child's Information:  Child's name: Cj Lowery preferred name: Cj   Child's YOB: 2020  Phone Numbers(s): [phone]  Child's identified gender: male  Language(s) Spoken at Home: English  Child's preferred language: English    Referral Source: Pediatrician    Concerns  Primary Concerns/Reason for Referral: speech delay, currently has an IEP     Attention/Hyperactivity:   Problems w/Attention/Focus: Yes   Hyperactivity:  No    Aggression:   Physical Aggression: to self:  No (and/or) to others: Yes  Property Destruction:    Yes  Frequency of Aggression: weekly  Intensity of Aggression:  (severity of damage to self/others) hitting and pushing   Discrimination: (where does behavior occur/with whom)  more at home but happens at school   Has the child required restraint due to the aggressive behaviors:   No    Elopement: Denied     Eating Concerns:   Picky Eater/Sensory Concerns:  No     Would you describe your child as overly...  Sad/Unhappy:  No  Nervous/Afraid:  No    Social History:    Family members living in home:  (siblings, aunt/uncle, grandparents)  Mom and sister     Family Hx of mental health: (diagnosed/treated)  Denied     Family Hx of substance abuse concerns: (diagnosed/treated)  Denied     Past or present involvement with DCFS or the police?  Denied     Trauma History:  (violence, accidents, medical, significant loss)  Denied       Development History:    History of early intervention (EI) involvement?  Yes, ST, SW     Past Diagnosis of Autism Spectrum Disorder (ASD)?  No    Developmental concerns:     Speech:   Yes, not processing communication properly     Fine Motor:  No    Gross Motor:  No    Sensory Concerns:  No    Educational  Information:    School Name:  [employer]   Current Grade:  Pre-K   Classroom Type:  Regular     Does child have educational plan? Individual Education Plan (IEP) with private school it is an ISP    Special services at school? Speech therapy, Social work, and  to come into the class   - suppose to get these services, but services haven't started yet    Does the school have current concerns regarding your child (i.e. behavior, learning disability)?  Shutting down, not playing well with friends at times, and being defiant      Medical History:    Ambulatory Care   PCP:  Dr. Rodriguez   Ongoing medical concerns/chronic conditions:  Denied   Secondary Care Team:  Denied     Current Medications  No current outpatient medications on file.     No current facility-administered medications for this visit.         Plan    Disposition: [facility]    Appointment scheduled: No   for appointment:  Rosibel AGSTON emailed: No  Location confirmed: No  No show/late cancel policy: No    Wait list for above service confirmed with parent:  Yes, mom is aware the wait for Dr. Butt is 2.5+ years.   Behavioral health daytime appointment availability reviewed: No    Referrals Requested/Given:  No   Call 911 for stroke/Need for follow up after discharge/Prescribed medications/Risk factors for stroke/Stroke education booklet/Stroke support groups for patients, families, and friends/Stroke warning signs and symptoms/Signs and symptoms of stroke

## 2024-03-26 RX ORDER — CARVEDILOL PHOSPHATE 80 MG/1
1 CAPSULE, EXTENDED RELEASE ORAL
Qty: 0 | Refills: 0 | DISCHARGE

## 2024-03-26 RX ORDER — METFORMIN HYDROCHLORIDE 850 MG/1
1 TABLET ORAL
Qty: 0 | Refills: 0 | DISCHARGE

## 2024-03-26 RX ORDER — DAPAGLIFLOZIN 10 MG/1
1 TABLET, FILM COATED ORAL
Refills: 0 | DISCHARGE

## 2024-03-26 RX ORDER — LISINOPRIL 2.5 MG/1
1 TABLET ORAL
Qty: 0 | Refills: 0 | DISCHARGE

## 2024-03-26 RX ORDER — VIBEGRON 75 MG/1
1 TABLET, FILM COATED ORAL
Refills: 0 | DISCHARGE

## 2024-03-26 RX ORDER — TAMSULOSIN HYDROCHLORIDE 0.4 MG/1
1 CAPSULE ORAL
Qty: 0 | Refills: 0 | DISCHARGE

## 2024-03-26 RX ORDER — FUROSEMIDE 40 MG
1 TABLET ORAL
Qty: 0 | Refills: 0 | DISCHARGE

## 2024-08-01 NOTE — PATIENT PROFILE ADULT - NSTRANSFEREYEGLASSESPAIRS_GEN_A_NUR
1 pair
DISCHARGE ISSUE - POOR ENGAGEMENT WITH SUPPORTS/FAMILY
DISCHARGE ISSUE - POOR ENGAGEMENT WITH SUPPORTS/FAMILY

## 2024-08-18 ENCOUNTER — INPATIENT (INPATIENT)
Facility: HOSPITAL | Age: 74
LOS: 9 days | Discharge: NURSING, REHAB OR LONG-TERM CARE | End: 2024-08-28
Attending: INTERNAL MEDICINE | Admitting: INTERNAL MEDICINE
Payer: MEDICARE

## 2024-08-18 VITALS
OXYGEN SATURATION: 99 % | DIASTOLIC BLOOD PRESSURE: 86 MMHG | HEIGHT: 64 IN | RESPIRATION RATE: 16 BRPM | HEART RATE: 72 BPM | TEMPERATURE: 98 F | SYSTOLIC BLOOD PRESSURE: 162 MMHG

## 2024-08-18 DIAGNOSIS — I48.91 UNSPECIFIED ATRIAL FIBRILLATION: ICD-10-CM

## 2024-08-18 LAB
ADD ON TEST-SPECIMEN IN LAB: SIGNIFICANT CHANGE UP
ALBUMIN SERPL ELPH-MCNC: 3.7 G/DL — SIGNIFICANT CHANGE UP (ref 3.3–5)
ALP SERPL-CCNC: 76 U/L — SIGNIFICANT CHANGE UP (ref 40–120)
ALT FLD-CCNC: 16 U/L — SIGNIFICANT CHANGE UP (ref 4–41)
ANION GAP SERPL CALC-SCNC: 10 MMOL/L — SIGNIFICANT CHANGE UP (ref 7–14)
AST SERPL-CCNC: 13 U/L — SIGNIFICANT CHANGE UP (ref 4–40)
BASOPHILS # BLD AUTO: 0.05 K/UL — SIGNIFICANT CHANGE UP (ref 0–0.2)
BASOPHILS NFR BLD AUTO: 0.7 % — SIGNIFICANT CHANGE UP (ref 0–2)
BILIRUB SERPL-MCNC: 0.7 MG/DL — SIGNIFICANT CHANGE UP (ref 0.2–1.2)
BLOOD GAS VENOUS COMPREHENSIVE RESULT: SIGNIFICANT CHANGE UP
BUN SERPL-MCNC: 22 MG/DL — SIGNIFICANT CHANGE UP (ref 7–23)
CALCIUM SERPL-MCNC: 9.8 MG/DL — SIGNIFICANT CHANGE UP (ref 8.4–10.5)
CHLORIDE SERPL-SCNC: 110 MMOL/L — HIGH (ref 98–107)
CO2 SERPL-SCNC: 20 MMOL/L — LOW (ref 22–31)
CREAT SERPL-MCNC: 1.15 MG/DL — SIGNIFICANT CHANGE UP (ref 0.5–1.3)
EGFR: 67 ML/MIN/1.73M2 — SIGNIFICANT CHANGE UP
EOSINOPHIL # BLD AUTO: 0.13 K/UL — SIGNIFICANT CHANGE UP (ref 0–0.5)
EOSINOPHIL NFR BLD AUTO: 1.8 % — SIGNIFICANT CHANGE UP (ref 0–6)
FLUAV AG NPH QL: SIGNIFICANT CHANGE UP
FLUBV AG NPH QL: SIGNIFICANT CHANGE UP
GLUCOSE SERPL-MCNC: 124 MG/DL — HIGH (ref 70–99)
HCT VFR BLD CALC: 42.3 % — SIGNIFICANT CHANGE UP (ref 39–50)
HGB BLD-MCNC: 13.8 G/DL — SIGNIFICANT CHANGE UP (ref 13–17)
IANC: 4.13 K/UL — SIGNIFICANT CHANGE UP (ref 1.8–7.4)
IMM GRANULOCYTES NFR BLD AUTO: 0.1 % — SIGNIFICANT CHANGE UP (ref 0–0.9)
LYMPHOCYTES # BLD AUTO: 2.18 K/UL — SIGNIFICANT CHANGE UP (ref 1–3.3)
LYMPHOCYTES # BLD AUTO: 30.8 % — SIGNIFICANT CHANGE UP (ref 13–44)
MCHC RBC-ENTMCNC: 29.1 PG — SIGNIFICANT CHANGE UP (ref 27–34)
MCHC RBC-ENTMCNC: 32.6 GM/DL — SIGNIFICANT CHANGE UP (ref 32–36)
MCV RBC AUTO: 89.1 FL — SIGNIFICANT CHANGE UP (ref 80–100)
MONOCYTES # BLD AUTO: 0.58 K/UL — SIGNIFICANT CHANGE UP (ref 0–0.9)
MONOCYTES NFR BLD AUTO: 8.2 % — SIGNIFICANT CHANGE UP (ref 2–14)
NEUTROPHILS # BLD AUTO: 4.13 K/UL — SIGNIFICANT CHANGE UP (ref 1.8–7.4)
NEUTROPHILS NFR BLD AUTO: 58.4 % — SIGNIFICANT CHANGE UP (ref 43–77)
NRBC # BLD: 0 /100 WBCS — SIGNIFICANT CHANGE UP (ref 0–0)
NRBC # FLD: 0 K/UL — SIGNIFICANT CHANGE UP (ref 0–0)
NT-PROBNP SERPL-SCNC: 1066 PG/ML — HIGH
PLATELET # BLD AUTO: 147 K/UL — LOW (ref 150–400)
POTASSIUM SERPL-MCNC: 4.6 MMOL/L — SIGNIFICANT CHANGE UP (ref 3.5–5.3)
POTASSIUM SERPL-SCNC: 4.6 MMOL/L — SIGNIFICANT CHANGE UP (ref 3.5–5.3)
PROT SERPL-MCNC: 6.8 G/DL — SIGNIFICANT CHANGE UP (ref 6–8.3)
RBC # BLD: 4.75 M/UL — SIGNIFICANT CHANGE UP (ref 4.2–5.8)
RBC # FLD: 13.1 % — SIGNIFICANT CHANGE UP (ref 10.3–14.5)
RSV RNA NPH QL NAA+NON-PROBE: SIGNIFICANT CHANGE UP
SARS-COV-2 RNA SPEC QL NAA+PROBE: SIGNIFICANT CHANGE UP
SODIUM SERPL-SCNC: 140 MMOL/L — SIGNIFICANT CHANGE UP (ref 135–145)
TROPONIN T, HIGH SENSITIVITY RESULT: 11 NG/L — SIGNIFICANT CHANGE UP
WBC # BLD: 7.08 K/UL — SIGNIFICANT CHANGE UP (ref 3.8–10.5)
WBC # FLD AUTO: 7.08 K/UL — SIGNIFICANT CHANGE UP (ref 3.8–10.5)

## 2024-08-18 PROCEDURE — 71275 CT ANGIOGRAPHY CHEST: CPT | Mod: 26,MC

## 2024-08-18 PROCEDURE — 99285 EMERGENCY DEPT VISIT HI MDM: CPT

## 2024-08-18 PROCEDURE — 99223 1ST HOSP IP/OBS HIGH 75: CPT

## 2024-08-18 PROCEDURE — 71045 X-RAY EXAM CHEST 1 VIEW: CPT | Mod: 26

## 2024-08-18 RX ORDER — METOPROLOL TARTRATE 100 MG/1
5 TABLET ORAL ONCE
Refills: 0 | Status: COMPLETED | OUTPATIENT
Start: 2024-08-18 | End: 2024-08-18

## 2024-08-18 RX ORDER — ACETAMINOPHEN 325 MG/1
1000 TABLET ORAL ONCE
Refills: 0 | Status: COMPLETED | OUTPATIENT
Start: 2024-08-18 | End: 2024-08-18

## 2024-08-18 RX ORDER — SODIUM CHLORIDE 9 MG/ML
500 INJECTION INTRAMUSCULAR; INTRAVENOUS; SUBCUTANEOUS ONCE
Refills: 0 | Status: COMPLETED | OUTPATIENT
Start: 2024-08-18 | End: 2024-08-18

## 2024-08-18 RX ORDER — METOPROLOL TARTRATE 100 MG/1
25 TABLET ORAL ONCE
Refills: 0 | Status: COMPLETED | OUTPATIENT
Start: 2024-08-18 | End: 2024-08-18

## 2024-08-18 RX ADMIN — SODIUM CHLORIDE 500 MILLILITER(S): 9 INJECTION INTRAMUSCULAR; INTRAVENOUS; SUBCUTANEOUS at 17:30

## 2024-08-18 RX ADMIN — METOPROLOL TARTRATE 25 MILLIGRAM(S): 100 TABLET ORAL at 21:47

## 2024-08-18 RX ADMIN — METOPROLOL TARTRATE 5 MILLIGRAM(S): 100 TABLET ORAL at 21:47

## 2024-08-18 RX ADMIN — ACETAMINOPHEN 400 MILLIGRAM(S): 325 TABLET ORAL at 17:30

## 2024-08-18 NOTE — ED ADULT NURSE NOTE - OBJECTIVE STATEMENT
Break RN: Patient received in room 24. Patient preferred language Mongolian; son, Severo Sheth present to interpret during patient encounter. Patient A&Ox3 and ambulatory with assistance at baseline. Patient referred to ED from outpatient MD's office due to EKG concerns. phx DM, prostate enlargement. Patient states he was at an outpatient MD appointment and advised by MD to come to ED for further evaluation. Per son, patient is baseline self. Airway patent, chest rise equal bilaterally, lung sounds clear and equal bilaterally, respirations unlabored. Patient denies dyspnea, shortness of breath, and chest pain. Abdomen flat, soft and non-distended; patient denies nausea/vomiting and changes in BMs/urine. Pulse/motor/sensory present and no edema noted to all four extremities. 20G IV placed to left forearm, labs collected and sent. Patient stable rhythm on tele. monitor. Safety measures in place, call bell within reach.

## 2024-08-18 NOTE — ED ADULT NURSE NOTE - NS ED NURSE RECORD ANOTHER HT AND WT
Anemia    Anxiety    Anxiety and depression    Asthma    Chronic sinusitis    Cirrhosis    Constipation, chronic    COPD (chronic obstructive pulmonary disease)  no recent exacerb  Diabetes mellitus  type 2  Fall at home    Fatty liver    GERD (gastroesophageal reflux disease)    Hyperlipidemia    Hypertension    Lung nodules    Syncope and collapse  10/2016 with long hospital stay at [facility], with negative cardiac work up as per patient, s/p angiogram too with no blockages  as per patient. + right arm nerve damages.  Thrombocytopenia    TIA (transient ischemic attack)  10/2016 on ASA and Plavix as per Dr. Perez
No

## 2024-08-18 NOTE — ED PROVIDER NOTE - CLINICAL SUMMARY MEDICAL DECISION MAKING FREE TEXT BOX
74yoM w/Hx of HTN, HLD, CVA (apx [id number]) w/o residual deficits, on carvedilol, sent in from cardiologist's office for abnormal EKG. EKG at cardiologist (Dr.Saed Graham) showing afib w/RVR, pt w/o Hx of afib. Takes ASA at home. Was there for routine f/u, sees cards due to hx of cva, no cardiac hx/MIs/stents in the past. Denies fever/chills, HA, SOB, CP, abd pain, n/v/d/c, urinary syx, numbness/tingling, focal weakness. ROS otherwise negative.     VS +tachycardia  General: WN/WD NAD  Head: Atraumatic  Eyes: EOM grossly in tact, no scleral icterus  ENT: dry mucous membranes  Neurology: A&Ox3, nonfocal, HOPKINS x 4  Respiratory: normal respiratory effort, grossly ctab b/l  CV: Extremities warm and well perfused, irregularly irregular no m/r/g  Abdominal: Soft, non-distended  Extremities: No edema  Skin: No rashes    DDx broad, pt rectally afebrile, will eval for metabolic/electrolyte vs acs/mi vs UTI vs URI vs other cause of new heart rhythm with labs, cxr, CT angio chest to r/o PE, UA/UCx, TBA for further management. - Camden Red MD. EM Attending

## 2024-08-18 NOTE — H&P ADULT - PROBLEM SELECTOR PLAN 4
on 75 gemtesa; pharmacy does not carry but carries Myrbetriq. will hold for now; if decision made to place on Myrbetriq and  afib difficult to rate control, consider holding

## 2024-08-18 NOTE — H&P ADULT - NSHPPHYSICALEXAM_GEN_ALL_CORE
PHYSICAL EXAM:      Constitutional: NAD, well-groomed, well-developed  HEENT:  EOMI, Somewhat [place], chronic facial droop  Neck: No LAD, No JVD  Back: Normal spine flexure, No CVA tenderness  Respiratory: CTAB  Cardiovascular: S1 and S2, RRR  Gastrointestinal: BS+, soft, NT/ND  Extremities: No peripheral edema  Vascular: 2+ peripheral pulses  Neurological: A/O x 3 per son, but with baseline difficulty following commands   Psychiatric: Normal mood, normal affect  Musculoskeletal: 3-4/5 strength b/l upper and lower extremities  Skin: No rashes

## 2024-08-18 NOTE — H&P ADULT - HISTORY OF PRESENT ILLNESS
74yoM w/Hx of HTN, BPH. DM2,  incontinence on Beta agonist,  HLD, CVA (apx [phone]) currently walker dependent  with old facial droop, on carvedilol (TTE Aug 2022 tech diff study, therefore  unable to evaluate LV systo function  but mild diastolic dysfunction -Stage I noted)   Pt sent in from cardiologist's office for abnormal EKG. EKG at cardiologist (Dr.Saed Graham) showing afib w/RVR, pt w/o apparent  Hx of afib. Takes ASA at home. Was there for routine f/u, Per son , pt denies recent palpitations, sob, cp, lightheadedness.     Son reports pt a/ox 3, lucid, but on my exam, pt had difficulty following commands even with son at bedside translating. Son does describe this as baseline behavior

## 2024-08-18 NOTE — H&P ADULT - PROBLEM SELECTOR PLAN 1
-given 25 lopressor and 5 mg IV in ed, unclear what max HT was, but current   -will c/w home coreg 25 bid, titrate as needed. (taking -given 25 lopressor and 5 mg IV in ed, unclear what max HR was, but current   -will c/w home coreg 25 bid, titrate as needed  -tele, tte, tsh  -given elevated chadsvasc, will initiate Eliquis  -defer to attend -CTA chest neg for PE  -given 25 lopressor and 5 mg IV in ed, unclear what max HR was, but current   -will c/w home coreg 25 bid, titrate as needed  -tele, tte, tsh  -given elevated chadsvasc, will initiate Eliquis  -defer to attend -CTA chest neg for PE  -given 25 lopressor and 5 mg IV in ed, unclear what max HR was, but current   -will c/w home coreg 25 bid, titrate as needed  -tele, tte, tsh  -given elevated chadsvasc, will initiate Eliquis  -defer to attending or record for cardiology eval -CTA chest neg for PE, but right sided opacities some of which are GGO; mini rvp neg, pt sat 99 on RA, afebrile, normal wbc; no abx for now  -given 25 lopressor and 5 mg IV in ed, unclear what max HR was, but current   -will c/w home coreg 25 bid, titrate as needed  -tele, tte, tsh  -given elevated chadsvasc, will initiate Eliquis  -defer to attending or record for cardiology eval

## 2024-08-18 NOTE — H&P ADULT - NSHPLABSRESULTS_GEN_ALL_CORE
13.8   7.08  )-----------( 147      ( 18 Aug 2024 17:04 )             42.3     08-18    140  |  110<H>  |  22  ----------------------------<  124<H>  4.6   |  20<L>  |  1.15    Ca    9.8      18 Aug 2024 17:04    TPro  6.8  /  Alb  3.7  /  TBili  0.7  /  DBili  x   /  AST  13  /  ALT  16  /  AlkPhos  76  08-18    CAPILLARY BLOOD GLUCOSE      POCT Blood Glucose.: 138 mg/dL (18 Aug 2024 16:03)      Urinalysis Basic - ( 18 Aug 2024 17:04 )    Color: x / Appearance: x / SG: x / pH: x  Gluc: 124 mg/dL / Ketone: x  / Bili: x / Urobili: x   Blood: x / Protein: x / Nitrite: x   Leuk Esterase: x / RBC: x / WBC x   Sq Epi: x / Non Sq Epi: x / Bacteria: x      Vital Signs Last 24 Hrs  T(C): 36.6 (18 Aug 2024 23:11), Max: 37.3 (18 Aug 2024 17:12)  T(F): 97.9 (18 Aug 2024 23:11), Max: 99.1 (18 Aug 2024 17:12)  HR: 100 (18 Aug 2024 23:11) (72 - 100)  BP: 150/86 (18 Aug 2024 23:11) (150/86 - 162/86)  BP(mean): --  RR: 18 (18 Aug 2024 23:11) (16 - 18)  SpO2: 99% (18 Aug 2024 23:11) (99% - 100%)    Parameters below as of 18 Aug 2024 23:11  Patient On (Oxygen Delivery Method): room air

## 2024-08-18 NOTE — ED ADULT NURSE NOTE - NSFALLRISKINTERV_ED_ALL_ED

## 2024-08-18 NOTE — H&P ADULT - NSHPREVIEWOFSYSTEMS_GEN_ALL_CORE
Review of Systems:   CONSTITUTIONAL: No fever  ENMT:  No new difficulty hearing  NECK: No pain or stiffness  RESPIRATORY:  No shortness of breath  CARDIOVASCULAR: No chest pain, palpitations, dizziness  GASTROINTESTINAL: No abdominal or epigastric pain  GENITOURINARY: baseline  incontinence  NEUROLOGICAL: No headache

## 2024-08-18 NOTE — H&P ADULT - PROBLEM SELECTOR PLAN 2
hold orals, place on ISS    carb diet; son concerned over asp risk; noted some coughing recently with po intake; passed bedside dysphagia screen, and per son request , placed on puree and thick liq. will additionally order formal swallow eval hold orals, place on ISS    carb diet; son concerned over asp risk; noted some coughing recently with po intake; passed bedside dysphagia screen, and per son request , placed on puree and thick liq. will additionally order formal swallow eval.  right sided opacities noted on CTA some of which are GGO; mini rvp neg, pt sat 99 on RA, afebrile, normal wbc; no abx for now

## 2024-08-19 DIAGNOSIS — I48.91 UNSPECIFIED ATRIAL FIBRILLATION: ICD-10-CM

## 2024-08-19 DIAGNOSIS — R80.9 PROTEINURIA, UNSPECIFIED: ICD-10-CM

## 2024-08-19 DIAGNOSIS — R32 UNSPECIFIED URINARY INCONTINENCE: ICD-10-CM

## 2024-08-19 DIAGNOSIS — Z79.899 OTHER LONG TERM (CURRENT) DRUG THERAPY: ICD-10-CM

## 2024-08-19 DIAGNOSIS — N40.0 BENIGN PROSTATIC HYPERPLASIA WITHOUT LOWER URINARY TRACT SYMPTOMS: ICD-10-CM

## 2024-08-19 DIAGNOSIS — Z29.9 ENCOUNTER FOR PROPHYLACTIC MEASURES, UNSPECIFIED: ICD-10-CM

## 2024-08-19 DIAGNOSIS — E11.9 TYPE 2 DIABETES MELLITUS WITHOUT COMPLICATIONS: ICD-10-CM

## 2024-08-19 PROBLEM — I10 ESSENTIAL (PRIMARY) HYPERTENSION: Chronic | Status: ACTIVE | Noted: 2022-08-16

## 2024-08-19 PROBLEM — I63.9 CEREBRAL INFARCTION, UNSPECIFIED: Chronic | Status: ACTIVE | Noted: 2022-08-16

## 2024-08-19 PROBLEM — Z87.438 PERSONAL HISTORY OF OTHER DISEASES OF MALE GENITAL ORGANS: Chronic | Status: ACTIVE | Noted: 2022-08-16

## 2024-08-19 LAB
A1C WITH ESTIMATED AVERAGE GLUCOSE RESULT: 6.1 % — HIGH (ref 4–5.6)
ALBUMIN SERPL ELPH-MCNC: 3.7 G/DL — SIGNIFICANT CHANGE UP (ref 3.3–5)
ALP SERPL-CCNC: 80 U/L — SIGNIFICANT CHANGE UP (ref 40–120)
ALT FLD-CCNC: 21 U/L — SIGNIFICANT CHANGE UP (ref 4–41)
ANION GAP SERPL CALC-SCNC: 15 MMOL/L — HIGH (ref 7–14)
APTT BLD: 40.6 SEC — HIGH (ref 24.5–35.6)
AST SERPL-CCNC: 19 U/L — SIGNIFICANT CHANGE UP (ref 4–40)
BILIRUB SERPL-MCNC: 0.8 MG/DL — SIGNIFICANT CHANGE UP (ref 0.2–1.2)
BUN SERPL-MCNC: 20 MG/DL — SIGNIFICANT CHANGE UP (ref 7–23)
CALCIUM SERPL-MCNC: 9.8 MG/DL — SIGNIFICANT CHANGE UP (ref 8.4–10.5)
CHLORIDE SERPL-SCNC: 105 MMOL/L — SIGNIFICANT CHANGE UP (ref 98–107)
CO2 SERPL-SCNC: 18 MMOL/L — LOW (ref 22–31)
CREAT SERPL-MCNC: 1 MG/DL — SIGNIFICANT CHANGE UP (ref 0.5–1.3)
EGFR: 79 ML/MIN/1.73M2 — SIGNIFICANT CHANGE UP
ESTIMATED AVERAGE GLUCOSE: 128 — SIGNIFICANT CHANGE UP
GLUCOSE BLDC GLUCOMTR-MCNC: 122 MG/DL — HIGH (ref 70–99)
GLUCOSE BLDC GLUCOMTR-MCNC: 135 MG/DL — HIGH (ref 70–99)
GLUCOSE BLDC GLUCOMTR-MCNC: 143 MG/DL — HIGH (ref 70–99)
GLUCOSE BLDC GLUCOMTR-MCNC: 185 MG/DL — HIGH (ref 70–99)
GLUCOSE BLDC GLUCOMTR-MCNC: 99 MG/DL — SIGNIFICANT CHANGE UP (ref 70–99)
GLUCOSE SERPL-MCNC: 140 MG/DL — HIGH (ref 70–99)
INR BLD: 1.55 RATIO — HIGH (ref 0.85–1.18)
MAGNESIUM SERPL-MCNC: 2 MG/DL — SIGNIFICANT CHANGE UP (ref 1.6–2.6)
POTASSIUM SERPL-MCNC: 4 MMOL/L — SIGNIFICANT CHANGE UP (ref 3.5–5.3)
POTASSIUM SERPL-SCNC: 4 MMOL/L — SIGNIFICANT CHANGE UP (ref 3.5–5.3)
PROT SERPL-MCNC: 7.2 G/DL — SIGNIFICANT CHANGE UP (ref 6–8.3)
PROTHROM AB SERPL-ACNC: 17.1 SEC — HIGH (ref 9.5–13)
SODIUM SERPL-SCNC: 138 MMOL/L — SIGNIFICANT CHANGE UP (ref 135–145)

## 2024-08-19 RX ORDER — TAMSULOSIN HYDROCHLORIDE 0.4 MG/1
1 CAPSULE ORAL
Refills: 0 | DISCHARGE

## 2024-08-19 RX ORDER — GLUCAGON INJECTION, SOLUTION 1 MG/.2ML
1 INJECTION, SOLUTION SUBCUTANEOUS ONCE
Refills: 0 | Status: DISCONTINUED | OUTPATIENT
Start: 2024-08-19 | End: 2024-08-28

## 2024-08-19 RX ORDER — APIXABAN 5 MG/1
5 TABLET, FILM COATED ORAL EVERY 12 HOURS
Refills: 0 | Status: DISCONTINUED | OUTPATIENT
Start: 2024-08-19 | End: 2024-08-21

## 2024-08-19 RX ORDER — ASPIRIN 81 MG
81 TABLET, DELAYED RELEASE (ENTERIC COATED) ORAL DAILY
Refills: 0 | Status: DISCONTINUED | OUTPATIENT
Start: 2024-08-19 | End: 2024-08-21

## 2024-08-19 RX ORDER — DEXTROSE 15 G/33 G
12.5 GEL IN PACKET (GRAM) ORAL ONCE
Refills: 0 | Status: DISCONTINUED | OUTPATIENT
Start: 2024-08-19 | End: 2024-08-28

## 2024-08-19 RX ORDER — DEXTROSE 15 G/33 G
25 GEL IN PACKET (GRAM) ORAL ONCE
Refills: 0 | Status: DISCONTINUED | OUTPATIENT
Start: 2024-08-19 | End: 2024-08-28

## 2024-08-19 RX ORDER — DAPAGLIFLOZIN 10 MG/1
1 TABLET, FILM COATED ORAL
Refills: 0 | DISCHARGE

## 2024-08-19 RX ORDER — CARVEDILOL 6.25 MG/1
25 TABLET ORAL EVERY 12 HOURS
Refills: 0 | Status: DISCONTINUED | OUTPATIENT
Start: 2024-08-19 | End: 2024-08-21

## 2024-08-19 RX ORDER — GLIPIZIDE 10 MG
1 TABLET ORAL
Refills: 0 | DISCHARGE

## 2024-08-19 RX ORDER — TAMSULOSIN HYDROCHLORIDE 0.4 MG/1
0.4 CAPSULE ORAL AT BEDTIME
Refills: 0 | Status: DISCONTINUED | OUTPATIENT
Start: 2024-08-19 | End: 2024-08-21

## 2024-08-19 RX ORDER — DEXTROSE 15 G/33 G
15 GEL IN PACKET (GRAM) ORAL ONCE
Refills: 0 | Status: DISCONTINUED | OUTPATIENT
Start: 2024-08-19 | End: 2024-08-28

## 2024-08-19 RX ADMIN — CARVEDILOL 25 MILLIGRAM(S): 6.25 TABLET ORAL at 06:19

## 2024-08-19 RX ADMIN — Medication 40 MILLIGRAM(S): at 22:09

## 2024-08-19 RX ADMIN — APIXABAN 5 MILLIGRAM(S): 5 TABLET, FILM COATED ORAL at 18:11

## 2024-08-19 RX ADMIN — CARVEDILOL 25 MILLIGRAM(S): 6.25 TABLET ORAL at 18:13

## 2024-08-19 RX ADMIN — APIXABAN 5 MILLIGRAM(S): 5 TABLET, FILM COATED ORAL at 06:19

## 2024-08-19 RX ADMIN — Medication 81 MILLIGRAM(S): at 11:19

## 2024-08-19 RX ADMIN — TAMSULOSIN HYDROCHLORIDE 0.4 MILLIGRAM(S): 0.4 CAPSULE ORAL at 22:09

## 2024-08-19 NOTE — PHARMACOTHERAPY INTERVENTION NOTE - COMMENTS
Medication history is complete. Medication list updated in Outpatient Medication Record (OMR) per [facility] and patient's son Severo.   Please note, patient's son unable to verify if patient currently taking lisinopril 20mg orally once a day or losartan 50mg orally once a day. Per [facility], losartan 50mg orally once a day was stopped in May 2024 and lisinopril 20mg orally once a day most recently dispensed 7/30/24 for a 90-day supply (OMR updated with lisinopril only).    Home Medications:  aspirin 81 mg oral delayed release tablet: 1 tab(s) orally once a day (19 Aug 2024 15:24)  atorvastatin 40 mg oral tablet: 1 tab(s) orally once a day (at bedtime) (19 Aug 2024 15:28)  bisacodyl 5 mg oral delayed release tablet: 1 tab(s) orally once a day as needed for  constipation (19 Aug 2024 15:24)  carvedilol 25 mg oral tablet: 1 tab(s) orally 2 times a day (19 Aug 2024 15:24)  Farxiga 10 mg oral tablet: 1 tab(s) orally once a day (19 Aug 2024 15:24)  finasteride 5 mg oral tablet: 1 tab(s) orally once a day (19 Aug 2024 15:24)  Gemtesa 75 mg oral tablet: 1 tab(s) orally once a day (19 Aug 2024 15:24)  glipiZIDE 5 mg oral tablet: 1 tab(s) orally 2 times a day (19 Aug 2024 15:24)  lisinopril 20 mg oral tablet: 1 tab(s) orally once a day (19 Aug 2024 15:28)  tamsulosin 0.4 mg oral capsule: 1 cap(s) orally once a day (at bedtime) (19 Aug 2024 15:29)    Please call spectra [phone] if you have any questions.

## 2024-08-19 NOTE — PROGRESS NOTE ADULT - SUBJECTIVE AND OBJECTIVE BOX
SUBJECTIVE / OVERNIGHT EVENTS:pt seen and examined  08-19-24     MEDICATIONS  (STANDING):  apixaban 5 milliGRAM(s) Oral every 12 hours  aspirin enteric coated 81 milliGRAM(s) Oral daily  atorvastatin 40 milliGRAM(s) Oral at bedtime  carvedilol 25 milliGRAM(s) Oral every 12 hours  dextrose 5%. 1000 milliLiter(s) (100 mL/Hr) IV Continuous <Continuous>  dextrose 5%. 1000 milliLiter(s) (50 mL/Hr) IV Continuous <Continuous>  dextrose 50% Injectable 25 Gram(s) IV Push once  dextrose 50% Injectable 12.5 Gram(s) IV Push once  dextrose 50% Injectable 25 Gram(s) IV Push once  glucagon  Injectable 1 milliGRAM(s) IntraMuscular once  insulin lispro (ADMELOG) corrective regimen sliding scale   SubCutaneous at bedtime  insulin lispro (ADMELOG) corrective regimen sliding scale   SubCutaneous three times a day before meals  tamsulosin 0.4 milliGRAM(s) Oral at bedtime    MEDICATIONS  (PRN):  dextrose Oral Gel 15 Gram(s) Oral once PRN Blood Glucose LESS THAN 70 milliGRAM(s)/deciliter    T(C): 36.7 (08-19-24 @ 18:06), Max: 36.7 (08-19-24 @ 11:54)  HR: 69 (08-19-24 @ 18:06) (69 - 100)  BP: 152/93 (08-19-24 @ 18:06) (143/90 - 152/93)  RR: 18 (08-19-24 @ 18:06) (18 - 18)  SpO2: 100% (08-19-24 @ 18:06) (98% - 100%)    CAPILLARY BLOOD GLUCOSE      POCT Blood Glucose.: 185 mg/dL (19 Aug 2024 21:32)  POCT Blood Glucose.: 99 mg/dL (19 Aug 2024 17:23)  POCT Blood Glucose.: 135 mg/dL (19 Aug 2024 12:13)  POCT Blood Glucose.: 143 mg/dL (19 Aug 2024 08:34)  POCT Blood Glucose.: 122 mg/dL (19 Aug 2024 07:34)    I&O's Summary      Constitutional: No fever, fatigue  Skin: No rash.  Eyes: No recent vision problems or eye pain.  ENT: No congestion, ear pain, or sore throat.  Cardiovascular: No chest pain or palpation.  Respiratory: No cough, shortness of breath, congestion, or wheezing.  Gastrointestinal: No abdominal pain, nausea, vomiting, or diarrhea.  Genitourinary: No dysuria.  Musculoskeletal: No joint swelling.  Neurologic: No headache.    PHYSICAL EXAM:  GENERAL: NAD  EYES: EOMI, PERRLA  NECK: Supple, No JVD  CHEST/LUNG: dec breath sounds at bases  HEART:  S1 , S2 +  ABDOMEN: soft, bs+  EXTREMITIES:  no edema  NEUROLOGY:alert awake      LABS:                        13.8   7.08  )-----------( 147      ( 18 Aug 2024 17:04 )             42.3     08-19    138  |  105  |  20  ----------------------------<  140<H>  4.0   |  18<L>  |  1.00    Ca    9.8      19 Aug 2024 11:00  Mg     2.00     08-19    TPro  7.2  /  Alb  3.7  /  TBili  0.8  /  DBili  x   /  AST  19  /  ALT  21  /  AlkPhos  80  08-19    PT/INR - ( 19 Aug 2024 11:00 )   PT: 17.1 sec;   INR: 1.55 ratio         PTT - ( 19 Aug 2024 11:00 )  PTT:40.6 sec      Urinalysis Basic - ( 19 Aug 2024 11:00 )    Color: x / Appearance: x / SG: x / pH: x  Gluc: 140 mg/dL / Ketone: x  / Bili: x / Urobili: x   Blood: x / Protein: x / Nitrite: x   Leuk Esterase: x / RBC: x / WBC x   Sq Epi: x / Non Sq Epi: x / Bacteria: x        RADIOLOGY & ADDITIONAL TESTS:    Imaging Personally Reviewed:    Consultant(s) Notes Reviewed:      Care Discussed with Consultants/Other Providers:

## 2024-08-19 NOTE — PHARMACOTHERAPY INTERVENTION NOTE - OUTCOME
Go to the nearest ER or return to clinic if symptoms worsen, fever/chill develop    Medicare Wellness  Personal Prevention Plan of Service     Date of Office Visit:  2021  Encounter Provider:  Shannon Rivers DO  Place of Service:  [facility]  Patient Name: Aure Mcginnis  :  1943    As part of the Medicare Wellness portion of your visit today, we are providing you with this personalized preventive plan of services (PPPS). This plan is based upon recommendations of the [place] Preventive Services Task Force (USPSTF) and the Advisory Committee on Immunization Practices (ACIP).    This lists the preventive care services that should be considered, and provides dates of when you are due. Items listed as completed are up-to-date and do not require any further intervention.    Health Maintenance   Topic Date Due   • COVID-19 Vaccine (1) 2021 (Originally 1955)   • INFLUENZA VACCINE  2022 (Originally 2021)   • Pneumococcal Vaccine 65+ (1 of 1 - PPSV23) 2022 (Originally 2008)   • DXA SCAN  2022 (Originally 1943)   • TDAP/TD VACCINES (1 - Tdap) 2022 (Originally 1962)   • ZOSTER VACCINE (2 of 2) 2023 (Originally 2017)   • ANNUAL WELLNESS VISIT  2022   • COLORECTAL CANCER SCREENING  2027   • HEPATITIS C SCREENING  Completed       Orders Placed This Encounter   Procedures   • Urine Culture - , Urine, Clean Catch     Order Specific Question:   Release to patient     Answer:   Immediate   • Vitamin D 25 Hydroxy     Order Specific Question:   Release to patient     Answer:   Immediate   • Vitamin B12     Order Specific Question:   Release to patient     Answer:   Immediate   • Basic Metabolic Panel     Order Specific Question:   Release to patient     Answer:   Immediate   • POC Urinalysis Dipstick     Order Specific Question:   Release to patient     Answer:   Immediate       Return in about 6 months  (around 5/4/2022) for Recheck, 1 year for Medicare  Wellness .         accepted

## 2024-08-19 NOTE — PHYSICAL THERAPY INITIAL EVALUATION ADULT - ADDITIONAL COMMENTS
Patient poor historian unable to received prior level of function, please see care coordination note for further details.

## 2024-08-20 ENCOUNTER — RESULT REVIEW (OUTPATIENT)
Age: 74
End: 2024-08-20

## 2024-08-20 LAB
ANION GAP SERPL CALC-SCNC: 14 MMOL/L — SIGNIFICANT CHANGE UP (ref 7–14)
BUN SERPL-MCNC: 20 MG/DL — SIGNIFICANT CHANGE UP (ref 7–23)
CALCIUM SERPL-MCNC: 9.8 MG/DL — SIGNIFICANT CHANGE UP (ref 8.4–10.5)
CHLORIDE SERPL-SCNC: 108 MMOL/L — HIGH (ref 98–107)
CO2 SERPL-SCNC: 14 MMOL/L — LOW (ref 22–31)
CREAT SERPL-MCNC: 0.97 MG/DL — SIGNIFICANT CHANGE UP (ref 0.5–1.3)
EGFR: 82 ML/MIN/1.73M2 — SIGNIFICANT CHANGE UP
GLUCOSE BLDC GLUCOMTR-MCNC: 100 MG/DL — HIGH (ref 70–99)
GLUCOSE BLDC GLUCOMTR-MCNC: 113 MG/DL — HIGH (ref 70–99)
GLUCOSE BLDC GLUCOMTR-MCNC: 116 MG/DL — HIGH (ref 70–99)
GLUCOSE BLDC GLUCOMTR-MCNC: 95 MG/DL — SIGNIFICANT CHANGE UP (ref 70–99)
GLUCOSE SERPL-MCNC: 128 MG/DL — HIGH (ref 70–99)
MAGNESIUM SERPL-MCNC: 1.8 MG/DL — SIGNIFICANT CHANGE UP (ref 1.6–2.6)
PHOSPHATE SERPL-MCNC: 3 MG/DL — SIGNIFICANT CHANGE UP (ref 2.5–4.5)
POTASSIUM SERPL-MCNC: 4.5 MMOL/L — SIGNIFICANT CHANGE UP (ref 3.5–5.3)
POTASSIUM SERPL-SCNC: 4.5 MMOL/L — SIGNIFICANT CHANGE UP (ref 3.5–5.3)
SODIUM SERPL-SCNC: 136 MMOL/L — SIGNIFICANT CHANGE UP (ref 135–145)

## 2024-08-20 PROCEDURE — 93306 TTE W/DOPPLER COMPLETE: CPT | Mod: 26

## 2024-08-20 RX ADMIN — APIXABAN 5 MILLIGRAM(S): 5 TABLET, FILM COATED ORAL at 17:41

## 2024-08-20 RX ADMIN — Medication 50 MILLILITER(S): at 22:04

## 2024-08-20 RX ADMIN — TAMSULOSIN HYDROCHLORIDE 0.4 MILLIGRAM(S): 0.4 CAPSULE ORAL at 22:05

## 2024-08-20 RX ADMIN — Medication 81 MILLIGRAM(S): at 12:41

## 2024-08-20 RX ADMIN — Medication 40 MILLIGRAM(S): at 22:04

## 2024-08-20 RX ADMIN — CARVEDILOL 25 MILLIGRAM(S): 6.25 TABLET ORAL at 17:40

## 2024-08-20 RX ADMIN — APIXABAN 5 MILLIGRAM(S): 5 TABLET, FILM COATED ORAL at 05:33

## 2024-08-20 RX ADMIN — CARVEDILOL 25 MILLIGRAM(S): 6.25 TABLET ORAL at 05:33

## 2024-08-20 NOTE — SWALLOW BEDSIDE ASSESSMENT ADULT - SWALLOW EVAL: PROGNOSIS
3. Moderate pharyngeal dysphagia for puree characterized by suspect delayed initiation of the pharyngeal swallow and hyolaryngeal excursion upon digital palpation with a cough response noted post swallow. 4. Unable to assess the pharyngeal stage for moderately thick liquids due to the severity of oral stage deficits.  Of Note; SLP notified RN given change in patient presentation based on RN report from breakfast. RN came to bedside to assist with suction set up and to visualize patient. SLP also noted ACP on unit.

## 2024-08-20 NOTE — CONSULT NOTE ADULT - ASSESSMENT
EKG Afib with RVR @, PVCs  Echo < from: TTE W or WO Ultrasound Enhancing Agent (08.20.24 @ 13:43) >   1. Left ventricular cavity is normal in size. Left ventricular wall thickness is normal. Left ventricular systolic function is mildly decreased with an ejection fraction visually estimated at 50 %. There are no regional wall motion abnormalities seen.   2. There is hypokinesis of the inferolateral wall.   3. Normal left ventricular diastolic function, with normal left ventricular filling pressure.   4. Normal right ventricular cavity size, with normal wall thickness, and normal right ventricular systolic function.   5. Normal left and right atrial size.   6. No pericardial effusion seen.   7. The inferior vena cava is normal in size (normal <2.1cm) with normal inspiratory collapse (normal >50%) consistent with normal right atrial pressure (~3, range 0-5mmHg).   8. Estimated pulmonary artery systolic pressure is 30 mmHg.        A/P    73 y/o Male w/Hx of HTN, BPH. DM2, incontinence on Beta agonist,  HLD, CVA (apx [id number]) currently walker dependent with old facial droop, on carvedilol (TTE Aug 2022 tech diff study, therefore  unable to evaluate LV systo function  but mild diastolic dysfunction -Stage I noted). Pt sent in from cardiologist's office for abnormal EKG. EKG at cardiologist (Dr.Saed Graham) showing afib w/RVR    1. Afib with RVR  - currently SR, rate controlled with HR 60s to tele  - Chest CT negative for PE  - c/w eliquis 5mg bid, coreg 25mg bid    2. hx of CVA  - on ac, asa, lipitor 40    3. HTN   - coreg 25mg bid    4. DVT ppx-on ac EKG Afib with RVR @, PVCs  Echo < from: TTE W or WO Ultrasound Enhancing Agent (08.20.24 @ 13:43) >   1. Left ventricular cavity is normal in size. Left ventricular wall thickness is normal. Left ventricular systolic function is mildly decreased with an ejection fraction visually estimated at 50 %. There are no regional wall motion abnormalities seen.   2. There is hypokinesis of the inferolateral wall.   3. Normal left ventricular diastolic function, with normal left ventricular filling pressure.   4. Normal right ventricular cavity size, with normal wall thickness, and normal right ventricular systolic function.   5. Normal left and right atrial size.   6. No pericardial effusion seen.   7. The inferior vena cava is normal in size (normal <2.1cm) with normal inspiratory collapse (normal >50%) consistent with normal right atrial pressure (~3, range 0-5mmHg).   8. Estimated pulmonary artery systolic pressure is 30 mmHg.        A/P    73 y/o Male w/Hx of HTN, BPH. DM2, incontinence on Beta agonist,  HLD, CVA (apx [id number]) currently walker dependent with old facial droop, on carvedilol (TTE Aug 2022 tech diff study, therefore  unable to evaluate LV systo function  but mild diastolic dysfunction -Stage I noted). Pt sent in from cardiologist's office for abnormal EKG. EKG at cardiologist (Dr.Saed Graham) showing afib w/RVR    1. Afib with RVR  - currently SR, rate controlled with HR 60s to tele  - Chest CT negative for PE shows RLL ground glass opacities corrolate clinically   - c/w eliquis 5mg bid CHADSVAsc score at least 4 has h/o stroke, coreg 25mg bid  - f/u 2d echo     2. hx of CVA  - on ac, asa, lipitor 40    3. HTN   - coreg 25mg bid    4. DVT ppx-on ac

## 2024-08-20 NOTE — SWALLOW BEDSIDE ASSESSMENT ADULT - ORAL PREPARATORY PHASE
Reduced oral grading/Bolus falls into anterior sulcus minimal to no manipulation SLP suctioned to remove/Reduced oral grading

## 2024-08-20 NOTE — CONSULT NOTE ADULT - SUBJECTIVE AND OBJECTIVE BOX
Severo Hernandez MD  Interventional Cardiology / Advance Heart Failure and Cardiac Transplant Specialist  [place] Office : [phone] [place] [address] [place] [address]  Tel:   [place] Office : [address] [facility] [place] [address]  Tel: [phone]  Cell : [phone]    HISTORY OF PRESENTING ILLNESS:    74yoM w/Hx of HTN, BPH. DM2,  incontinence on Beta agonist,  HLD, CVA (apx [id number]) currently walker dependent with old facial droop, on carvedilol (TTE Aug 2022 tech diff study, therefore  unable to evaluate LV systo function  but mild diastolic dysfunction -Stage I noted)   Pt sent in from cardiologist's office for abnormal EKG. EKG at cardiologist (Dr.Saed Graham) showing afib w/RVR, pt w/o apparent  Hx of afib. Takes ASA at home. Was there for routine f/u, Per son , pt denies recent palpitations, sob, cp, lightheadedness.  Son reports pt a/ox 3, lucid, but on my exam, pt had difficulty following commands even with son at bedside translating. Son does describe this as baseline behavior 	    Cardiology called to evaluate pt, pt seen and examined at bedside, denies chest pain,sob or palpitations.    MEDICATIONS:  apixaban 5 milliGRAM(s) Oral every 12 hours  aspirin enteric coated 81 milliGRAM(s) Oral daily  carvedilol 25 milliGRAM(s) Oral every 12 hours            atorvastatin 40 milliGRAM(s) Oral at bedtime  dextrose 50% Injectable 25 Gram(s) IV Push once  dextrose 50% Injectable 12.5 Gram(s) IV Push once  dextrose 50% Injectable 25 Gram(s) IV Push once  dextrose Oral Gel 15 Gram(s) Oral once PRN  glucagon  Injectable 1 milliGRAM(s) IntraMuscular once  insulin lispro (ADMELOG) corrective regimen sliding scale   SubCutaneous every 6 hours    dextrose 5%. 1000 milliLiter(s) IV Continuous <Continuous>  dextrose 5%. 1000 milliLiter(s) IV Continuous <Continuous>  tamsulosin 0.4 milliGRAM(s) Oral at bedtime      PAST MEDICAL/SURGICAL HISTORY  PAST MEDICAL & SURGICAL HISTORY:  Type 2 diabetes mellitus      CVA (cerebrovascular accident)      HTN (hypertension)      History of BPH      No significant past surgical history          SOCIAL HISTORY: Substance Use (street drugs): ( x ) never used  (  ) other:    FAMILY HISTORY:      REVIEW OF SYSTEMS:  CONSTITUTIONAL: No fever, weight loss, or fatigue  EYES: No eye pain, visual disturbances, or discharge  ENMT:  No difficulty hearing, tinnitus, vertigo; No sinus or throat pain  BREASTS: No pain, masses, or nipple discharge  GASTROINTESTINAL: No abdominal or epigastric pain. No nausea, vomiting, or hematemesis; No diarrhea or constipation. No melena or hematochezia.  GENITOURINARY: No dysuria, frequency, hematuria, or incontinence  NEUROLOGICAL: No headaches, memory loss, loss of strength, numbness, or tremors  ENDOCRINE: No heat or cold intolerance; No hair loss  MUSCULOSKELETAL: No joint pain or swelling; No muscle, back, or extremity pain  PSYCHIATRIC: No depression, anxiety, mood swings, or difficulty sleeping  HEME/LYMPH: No easy bruising, or bleeding gums  All others negative    PHYSICAL EXAM:  T(C): 36.6 (08-20-24 @ 12:46), Max: 36.7 (08-19-24 @ 18:06)  HR: 70 (08-20-24 @ 12:46) (69 - 81)  BP: 150/92 (08-20-24 @ 12:46) (148/87 - 152/93)  RR: 18 (08-20-24 @ 12:46) (18 - 18)  SpO2: 98% (08-20-24 @ 12:46) (98% - 100%)  Wt(kg): --  I&O's Summary        GENERAL: NAD  EYES: EOMI, PERRLA, conjunctiva and sclera clear  ENMT: No tonsillar erythema, exudates, or enlargement; Moist mucous membranes, Good dentition, No lesions  Cardiovascular: Normal S1 S2, No JVD, No murmurs, No edema  Respiratory: Lungs clear to auscultation	  Gastrointestinal:  Soft, Non-tender, + BS	  Extremities: Normal range of motion, No clubbing, cyanosis or edema  LYMPH: No lymphadenopathy noted  NERVOUS SYSTEM:  Alert & Oriented X3, Good concentration; Motor Strength 5/5 B/L upper and lower extremities; DTRs 2+ intact and symmetric                                    13.8   7.08  )-----------( 147      ( 18 Aug 2024 17:04 )             42.3     08-20    136  |  108<H>  |  20  ----------------------------<  128<H>  4.5   |  14<L>  |  0.97    Ca    9.8      20 Aug 2024 07:00  Phos  3.0     08-20  Mg     1.80     08-20    TPro  7.2  /  Alb  3.7  /  TBili  0.8  /  DBili  x   /  AST  19  /  ALT  21  /  AlkPhos  80  08-19    proBNP:   Lipid Profile:   HgA1c:   TSH:     Consultant(s) Notes Reviewed:  [x ] YES  [ ] NO    Care Discussed with Consultants/Other Providers [ x] YES  [ ] NO    Imaging Personally Reviewed independently:  [x] YES  [ ] NO    All labs, radiologic studies, vitals, orders and medications list reviewed. Patient is seen and examined at bedside. Case discussed with medical team.         Severo Hernandez MD  Interventional Cardiology / Advance Heart Failure and Cardiac Transplant Specialist  [place] Office : [address] [place] [address] [place] [address]  Tel:   [place] Office : [address] [facility] [place] [address]  Tel: [phone]  Cell : [phone]    HISTORY OF PRESENTING ILLNESS:    74yoM w/Hx of HTN, BPH. 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 EKG at cardiologist (Dr.Saed Graham) showing afib w/RVR, pt w/o apparent  Hx of afib. Takes ASA at home. Was there for routine f/u, Per son , pt denies recent palpitations, sob, cp, lightheadedness.  Son reports pt a/ox 3, lucid, but on my exam, pt had difficulty following commands even with son at bedside translating. Son does describe this as baseline behavior 	    Cardiology called to evaluate pt, pt seen and examined at bedside, denies chest pain,sob or palpitations.    MEDICATIONS:  apixaban 5 milliGRAM(s) Oral every 12 hours  aspirin enteric coated 81 milliGRAM(s) Oral daily  carvedilol 25 milliGRAM(s) Oral every 12 hours            atorvastatin 40 milliGRAM(s) Oral at bedtime  dextrose 50% Injectable 25 Gram(s) IV Push once  dextrose 50% Injectable 12.5 Gram(s) IV Push once  dextrose 50% Injectable 25 Gram(s) IV Push once  dextrose Oral Gel 15 Gram(s) Oral once PRN  glucagon  Injectable 1 milliGRAM(s) IntraMuscular once  insulin lispro (ADMELOG) corrective regimen sliding scale   SubCutaneous every 6 hours    dextrose 5%. 1000 milliLiter(s) IV Continuous <Continuous>  dextrose 5%. 1000 milliLiter(s) IV Continuous <Continuous>  tamsulosin 0.4 milliGRAM(s) Oral at bedtime      PAST MEDICAL/SURGICAL HISTORY  PAST MEDICAL & SURGICAL HISTORY:  Type 2 diabetes mellitus      CVA (cerebrovascular accident)      HTN (hypertension)      History of BPH      No significant past surgical history          SOCIAL HISTORY: Substance Use (street drugs): ( x ) never used  (  ) other:    FAMILY HISTORY:         PHYSICAL EXAM:  T(C): 36.6 (08-20-24 @ 12:46), Max: 36.7 (08-19-24 @ 18:06)  HR: 70 (08-20-24 @ 12:46) (69 - 81)  BP: 150/92 (08-20-24 @ 12:46) (148/87 - 152/93)  RR: 18 (08-20-24 @ 12:46) (18 - 18)  SpO2: 98% (08-20-24 @ 12:46) (98% - 100%)  Wt(kg): --  I&O's Summary        GENERAL: dysarthria unable to verbalize  EYES: EOMI, PERRLA, conjunctiva and sclera clear  ENMT: No tonsillar erythema, exudates, or enlargement; Moist mucous membranes, Good dentition, No lesions  Cardiovascular: Normal S1 S2, No JVD, No murmurs, No edema  Respiratory: Lungs clear to auscultation	  Gastrointestinal:  Soft, Non-tender, + BS	  Extremities: no edema                                    13.8   7.08  )-----------( 147      ( 18 Aug 2024 17:04 )             42.3     08-20    136  |  108<H>  |  20  ----------------------------<  128<H>  4.5   |  14<L>  |  0.97    Ca    9.8      20 Aug 2024 07:00  Phos  3.0     08-20  Mg     1.80     08-20    TPro  7.2  /  Alb  3.7  /  TBili  0.8  /  DBili  x   /  AST  19  /  ALT  21  /  AlkPhos  80  08-19    proBNP:   Lipid Profile:   HgA1c:   TSH:     Consultant(s) Notes Reviewed:  [x ] YES  [ ] NO    Care Discussed with Consultants/Other Providers [ x] YES  [ ] NO    Imaging Personally Reviewed independently:  [x] YES  [ ] NO    All labs, radiologic studies, vitals, orders and medications list reviewed. Patient is seen and examined at bedside. Case discussed with medical team.

## 2024-08-20 NOTE — CHART NOTE - NSCHARTNOTEFT_GEN_A_CORE
Spoke to pt's son in regard to NPO status and the potential need for IVF.  Explained to the pt's son, that the CT angio showed a mild right side opacity with the appearance of GGO. Mild hypokinesis, and LVEF 50%, slightly diminished.  As per cardiology note, no plan for IVF given pt's npo status.      Vital Signs Last 24 Hrs  T(C): 36.8 (20 Aug 2024 20:21), Max: 36.8 (20 Aug 2024 20:21)  T(F): 98.3 (20 Aug 2024 20:21), Max: 98.3 (20 Aug 2024 20:21)  HR: 73 (20 Aug 2024 20:21) (70 - 81)  BP: 161/86 (20 Aug 2024 20:21) (148/87 - 161/86)  BP(mean): --  RR: 18 (20 Aug 2024 20:21) (18 - 18)  SpO2: 97% (20 Aug 2024 20:21) (97% - 100%)    Parameters below as of 20 Aug 2024 20:21  Patient On (Oxygen Delivery Method): room air      - blood glucose monitoring every 6 hours  - notified Dr. Kaur re: the initiation of ivf - awaiting for response. Spoke to pt's son in regard to NPO status and the potential need for IVF.  Explained to the pt's son, that the CT angio showed a mild right side opacity with the appearance of GGO. Mild hypokinesis, and LVEF 50%, slightly diminished.  As per cardiology note, no plan for IVF given pt's npo status.  Addressed all concerns and answered all questions.     Vital Signs Last 24 Hrs  T(C): 36.8 (20 Aug 2024 20:21), Max: 36.8 (20 Aug 2024 20:21)  T(F): 98.3 (20 Aug 2024 20:21), Max: 98.3 (20 Aug 2024 20:21)  HR: 73 (20 Aug 2024 20:21) (70 - 81)  BP: 161/86 (20 Aug 2024 20:21) (148/87 - 161/86)  BP(mean): --  RR: 18 (20 Aug 2024 20:21) (18 - 18)  SpO2: 97% (20 Aug 2024 20:21) (97% - 100%)    Parameters below as of 20 Aug 2024 20:21  Patient On (Oxygen Delivery Method): room air      - blood glucose monitoring every 6 hours  - notified Dr. Kaur re: the initiation of ivf - awaiting for response. Spoke to pt's son in regard to NPO status and the potential need for IVF.  Explained to the pt's son, that the CT angio showed a mild right side opacity with the appearance of GGO. Mild hypokinesis, and LVEF 50%, slightly diminished.  As per cardiology note, no plan for IVF given pt's npo status.  Addressed all concerns and answered all questions.     Vital Signs Last 24 Hrs  T(C): 36.8 (20 Aug 2024 20:21), Max: 36.8 (20 Aug 2024 20:21)  T(F): 98.3 (20 Aug 2024 20:21), Max: 98.3 (20 Aug 2024 20:21)  HR: 73 (20 Aug 2024 20:21) (70 - 81)  BP: 161/86 (20 Aug 2024 20:21) (148/87 - 161/86)  BP(mean): --  RR: 18 (20 Aug 2024 20:21) (18 - 18)  SpO2: 97% (20 Aug 2024 20:21) (97% - 100%)    Parameters below as of 20 Aug 2024 20:21  Patient On (Oxygen Delivery Method): room air      - blood glucose monitoring every 6 hours  - notified Dr. Kaur re: the initiation of ivf - awaiting for response.    addendum:  920pm - D5NS @ 50 cc/hr initiated as per Dr. Kaur.

## 2024-08-20 NOTE — PROGRESS NOTE ADULT - SUBJECTIVE AND OBJECTIVE BOX
SUBJECTIVE / OVERNIGHT EVENTS:pt seen and examined  08-20-24     MEDICATIONS  (STANDING):  apixaban 5 milliGRAM(s) Oral every 12 hours  aspirin enteric coated 81 milliGRAM(s) Oral daily  atorvastatin 40 milliGRAM(s) Oral at bedtime  carvedilol 25 milliGRAM(s) Oral every 12 hours  dextrose 5% + sodium chloride 0.9%. 1000 milliLiter(s) (50 mL/Hr) IV Continuous <Continuous>  dextrose 5%. 1000 milliLiter(s) (100 mL/Hr) IV Continuous <Continuous>  dextrose 5%. 1000 milliLiter(s) (50 mL/Hr) IV Continuous <Continuous>  dextrose 50% Injectable 25 Gram(s) IV Push once  dextrose 50% Injectable 25 Gram(s) IV Push once  dextrose 50% Injectable 12.5 Gram(s) IV Push once  glucagon  Injectable 1 milliGRAM(s) IntraMuscular once  insulin lispro (ADMELOG) corrective regimen sliding scale   SubCutaneous every 6 hours  tamsulosin 0.4 milliGRAM(s) Oral at bedtime    MEDICATIONS  (PRN):  dextrose Oral Gel 15 Gram(s) Oral once PRN Blood Glucose LESS THAN 70 milliGRAM(s)/deciliter      Vital Signs Last 24 Hrs  T(C): 36.8 (08-20-24 @ 20:21), Max: 36.8 (08-20-24 @ 20:21)  T(F): 98.3 (08-20-24 @ 20:21), Max: 98.3 (08-20-24 @ 20:21)  HR: 73 (08-20-24 @ 20:21) (70 - 81)  BP: 161/86 (08-20-24 @ 20:21) (148/87 - 161/86)  BP(mean): --  RR: 18 (08-20-24 @ 20:21) (18 - 18)  SpO2: 97% (08-20-24 @ 20:21) (97% - 100%)    I&O's Summary      Constitutional: No fever, fatigue  Skin: No rash.  Eyes: No recent vision problems or eye pain.  ENT: No congestion, ear pain, or sore throat.  Cardiovascular: No chest pain or palpation.  Respiratory: No cough, shortness of breath, congestion, or wheezing.  Gastrointestinal: No abdominal pain, nausea, vomiting, or diarrhea.  Genitourinary: No dysuria.  Musculoskeletal: No joint swelling.  Neurologic: No headache.    PHYSICAL EXAM:  GENERAL: NAD  EYES: EOMI, PERRLA  NECK: Supple, No JVD  CHEST/LUNG: dec breath sounds at bases  HEART:  S1 , S2 +  ABDOMEN: soft, bs+  EXTREMITIES:  no edema  NEUROLOGY:alert awake      LABS:  08-20    136  |  108<H>  |  20  ----------------------------<  128<H>  4.5   |  14<L>  |  0.97    Ca    9.8      20 Aug 2024 07:00  Phos  3.0     08-20  Mg     1.80     08-20    TPro  7.2  /  Alb  3.7  /  TBili  0.8  /  DBili      /  AST  19  /  ALT  21  /  AlkPhos  80  08-19    Creatinine Trend: 0.97 <--, 1.00 <--, 1.15 <--    Urine Studies:  Urinalysis Basic - ( 20 Aug 2024 07:00 )    Color:  / Appearance:  / SG:  / pH:   Gluc: 128 mg/dL / Ketone:   / Bili:  / Urobili:    Blood:  / Protein:  / Nitrite:    Leuk Esterase:  / RBC:  / WBC    Sq Epi:  / Non Sq Epi:  / Bacteria:               LIVER FUNCTIONS - ( 19 Aug 2024 11:00 )  Alb: 3.7 g/dL / Pro: 7.2 g/dL / ALK PHOS: 80 U/L / ALT: 21 U/L / AST: 19 U/L / GGT: x           PT/INR - ( 19 Aug 2024 11:00 )   PT: 17.1 sec;   INR: 1.55 ratio         PTT - ( 19 Aug 2024 11:00 )  PTT:40.6 sec  Urinalysis Basic - ( 19 Aug 2024 11:00 )    Color: x / Appearance: x / SG: x / pH: x  Gluc: 140 mg/dL / Ketone: x  / Bili: x / Urobili: x   Blood: x / Protein: x / Nitrite: x   Leuk Esterase: x / RBC: x / WBC x   Sq Epi: x / Non Sq Epi: x / Bacteria: x        RADIOLOGY & ADDITIONAL TESTS:    Imaging Personally Reviewed:    Consultant(s) Notes Reviewed:      Care Discussed with Consultants/Other Providers:

## 2024-08-21 LAB
ANION GAP SERPL CALC-SCNC: 11 MMOL/L — SIGNIFICANT CHANGE UP (ref 7–14)
BUN SERPL-MCNC: 15 MG/DL — SIGNIFICANT CHANGE UP (ref 7–23)
CALCIUM SERPL-MCNC: 9.5 MG/DL — SIGNIFICANT CHANGE UP (ref 8.4–10.5)
CHLORIDE SERPL-SCNC: 109 MMOL/L — HIGH (ref 98–107)
CO2 SERPL-SCNC: 20 MMOL/L — LOW (ref 22–31)
CREAT SERPL-MCNC: 0.94 MG/DL — SIGNIFICANT CHANGE UP (ref 0.5–1.3)
EGFR: 85 ML/MIN/1.73M2 — SIGNIFICANT CHANGE UP
GLUCOSE BLDC GLUCOMTR-MCNC: 109 MG/DL — HIGH (ref 70–99)
GLUCOSE BLDC GLUCOMTR-MCNC: 117 MG/DL — HIGH (ref 70–99)
GLUCOSE BLDC GLUCOMTR-MCNC: 118 MG/DL — HIGH (ref 70–99)
GLUCOSE BLDC GLUCOMTR-MCNC: 144 MG/DL — HIGH (ref 70–99)
GLUCOSE SERPL-MCNC: 120 MG/DL — HIGH (ref 70–99)
MAGNESIUM SERPL-MCNC: 1.7 MG/DL — SIGNIFICANT CHANGE UP (ref 1.6–2.6)
PHOSPHATE SERPL-MCNC: 2.7 MG/DL — SIGNIFICANT CHANGE UP (ref 2.5–4.5)
POTASSIUM SERPL-MCNC: 3.5 MMOL/L — SIGNIFICANT CHANGE UP (ref 3.5–5.3)
POTASSIUM SERPL-SCNC: 3.5 MMOL/L — SIGNIFICANT CHANGE UP (ref 3.5–5.3)
SODIUM SERPL-SCNC: 140 MMOL/L — SIGNIFICANT CHANGE UP (ref 135–145)

## 2024-08-21 RX ORDER — ENOXAPARIN SODIUM 100 MG/ML
60 INJECTION SUBCUTANEOUS EVERY 12 HOURS
Refills: 0 | Status: DISCONTINUED | OUTPATIENT
Start: 2024-08-21 | End: 2024-08-26

## 2024-08-21 RX ORDER — METOPROLOL TARTRATE 100 MG/1
2.5 TABLET ORAL EVERY 6 HOURS
Refills: 0 | Status: DISCONTINUED | OUTPATIENT
Start: 2024-08-21 | End: 2024-08-22

## 2024-08-21 RX ADMIN — METOPROLOL TARTRATE 2.5 MILLIGRAM(S): 100 TABLET ORAL at 23:52

## 2024-08-21 RX ADMIN — ENOXAPARIN SODIUM 60 MILLIGRAM(S): 100 INJECTION SUBCUTANEOUS at 21:00

## 2024-08-21 RX ADMIN — METOPROLOL TARTRATE 2.5 MILLIGRAM(S): 100 TABLET ORAL at 18:39

## 2024-08-21 RX ADMIN — CARVEDILOL 25 MILLIGRAM(S): 6.25 TABLET ORAL at 05:08

## 2024-08-21 RX ADMIN — APIXABAN 5 MILLIGRAM(S): 5 TABLET, FILM COATED ORAL at 05:08

## 2024-08-21 NOTE — PROGRESS NOTE ADULT - ASSESSMENT
EKG Afib with RVR @, PVCs  Echo < from: TTE W or WO Ultrasound Enhancing Agent (08.20.24 @ 13:43) >   1. Left ventricular cavity is normal in size. Left ventricular wall thickness is normal. Left ventricular systolic function is mildly decreased with an ejection fraction visually estimated at 50 %. There are no regional wall motion abnormalities seen.   2. There is hypokinesis of the inferolateral wall.   3. Normal left ventricular diastolic function, with normal left ventricular filling pressure.   4. Normal right ventricular cavity size, with normal wall thickness, and normal right ventricular systolic function.   5. Normal left and right atrial size.   6. No pericardial effusion seen.   7. The inferior vena cava is normal in size (normal <2.1cm) with normal inspiratory collapse (normal >50%) consistent with normal right atrial pressure (~3, range 0-5mmHg).   8. Estimated pulmonary artery systolic pressure is 30 mmHg.        A/P    75 y/o Male w/Hx of HTN, BPH. DM2, incontinence on Beta agonist,  HLD, CVA (apx [id number]) currently walker dependent with old facial droop, on carvedilol (TTE Aug 2022 tech diff study, therefore  unable to evaluate LV systo function  but mild diastolic dysfunction -Stage I noted). Pt sent in from cardiologist's office for abnormal EKG. EKG at cardiologist (Dr.Saed Graham) showing afib w/RVR    1. Afib with RVR  - currently SR, rate controlled with HR 60s to tele  - Chest CT negative for PE shows RLL ground glass opacities corrolate clinically   - c/w eliquis 5mg bid CHADSVAsc score at least 4 has h/o stroke, coreg 25mg bid  - f/u 2d echo     2. hx of CVA  - on ac, asa, lipitor 40    3. HTN   - coreg 25mg bid  - add Enalapril 5mg for elevated blood pressure    4. DVT ppx-on ac EKG Afib with RVR @, PVCs  Echo < from: TTE W or WO Ultrasound Enhancing Agent (08.20.24 @ 13:43) >   1. Left ventricular cavity is normal in size. Left ventricular wall thickness is normal. Left ventricular systolic function is mildly decreased with an ejection fraction visually estimated at 50 %. There are no regional wall motion abnormalities seen.   2. There is hypokinesis of the inferolateral wall.   3. Normal left ventricular diastolic function, with normal left ventricular filling pressure.   4. Normal right ventricular cavity size, with normal wall thickness, and normal right ventricular systolic function.   5. Normal left and right atrial size.   6. No pericardial effusion seen.   7. The inferior vena cava is normal in size (normal <2.1cm) with normal inspiratory collapse (normal >50%) consistent with normal right atrial pressure (~3, range 0-5mmHg).   8. Estimated pulmonary artery systolic pressure is 30 mmHg.        A/P    75 y/o Male w/Hx of HTN, BPH. DM2, incontinence on Beta agonist,  HLD, CVA (apx [id number]) currently walker dependent with old facial droop, on carvedilol (TTE Aug 2022 tech diff study, therefore  unable to evaluate LV systo function  but mild diastolic dysfunction -Stage I noted). Pt sent in from cardiologist's office for abnormal EKG. EKG at cardiologist (Dr.Saed Graham) showing afib w/RVR    1. Afib with RVR  - currently SR, rate controlled with HR 60s to tele  - Chest CT negative for PE shows RLL ground glass opacities corrolate clinically   - c/w eliquis 5mg bid CHADSVAsc score at least 4 has h/o stroke, coreg 25mg bid  - 2d echo shows EF 50 % inferior wall hypokinesis, not a candidate for cath, will do medical therapy cont asa lipitor     2. hx of CVA  - on ac, asa, lipitor 40    3. HTN   - coreg 25mg bid  - add Enalapril 5mg for elevated blood pressure    4. DVT ppx-on ac

## 2024-08-21 NOTE — PROGRESS NOTE ADULT - SUBJECTIVE AND OBJECTIVE BOX
SUBJECTIVE / OVERNIGHT EVENTS:pt seen and examined  08-21-24     MEDICATIONS  (STANDING):  dextrose 5% + sodium chloride 0.9%. 1000 milliLiter(s) (50 mL/Hr) IV Continuous <Continuous>  dextrose 5%. 1000 milliLiter(s) (50 mL/Hr) IV Continuous <Continuous>  dextrose 5%. 1000 milliLiter(s) (100 mL/Hr) IV Continuous <Continuous>  dextrose 50% Injectable 12.5 Gram(s) IV Push once  dextrose 50% Injectable 25 Gram(s) IV Push once  dextrose 50% Injectable 25 Gram(s) IV Push once  enoxaparin Injectable 60 milliGRAM(s) SubCutaneous every 12 hours  glucagon  Injectable 1 milliGRAM(s) IntraMuscular once  insulin lispro (ADMELOG) corrective regimen sliding scale   SubCutaneous every 6 hours  metoprolol tartrate Injectable 2.5 milliGRAM(s) IV Push every 6 hours    MEDICATIONS  (PRN):  dextrose Oral Gel 15 Gram(s) Oral once PRN Blood Glucose LESS THAN 70 milliGRAM(s)/deciliter    Vital Signs Last 24 Hrs  T(C): 36.8 (08-21-24 @ 20:26), Max: 36.9 (08-21-24 @ 00:29)  T(F): 98.2 (08-21-24 @ 20:26), Max: 98.5 (08-21-24 @ 00:29)  HR: 70 (08-21-24 @ 20:26) (63 - 78)  BP: 176/79 (08-21-24 @ 20:26) (155/71 - 176/79)  BP(mean): --  RR: 18 (08-21-24 @ 20:26) (16 - 18)  SpO2: 99% (08-21-24 @ 20:26) (95% - 99%)        Constitutional: No fever, fatigue  Skin: No rash.  Eyes: No recent vision problems or eye pain.  ENT: No congestion, ear pain, or sore throat.  Cardiovascular: No chest pain or palpation.  Respiratory: No cough, shortness of breath, congestion, or wheezing.  Gastrointestinal: No abdominal pain, nausea, vomiting, or diarrhea.  Genitourinary: No dysuria.  Musculoskeletal: No joint swelling.  Neurologic: No headache.    PHYSICAL EXAM:  GENERAL: NAD  EYES: EOMI, PERRLA  NECK: Supple, No JVD  CHEST/LUNG: dec breath sounds at bases  HEART:  S1 , S2 +  ABDOMEN: soft, bs+  EXTREMITIES:  no edema  NEUROLOGY:alert awake    LABS:  08-21    140  |  109<H>  |  15  ----------------------------<  120<H>  3.5   |  20<L>  |  0.94    Ca    9.5      21 Aug 2024 06:30  Phos  2.7     08-21  Mg     1.70     08-21      Creatinine Trend: 0.94 <--, 0.97 <--, 1.00 <--, 1.15 <--    Urine Studies:  Urinalysis Basic - ( 21 Aug 2024 06:30 )    Color:  / Appearance:  / SG:  / pH:   Gluc: 120 mg/dL / Ketone:   / Bili:  / Urobili:    Blood:  / Protein:  / Nitrite:    Leuk Esterase:  / RBC:  / WBC    Sq Epi:  / Non Sq Epi:  / Bacteria:                     RADIOLOGY & ADDITIONAL TESTS:    Imaging Personally Reviewed:    Consultant(s) Notes Reviewed:      Care Discussed with Consultants/Other Providers:

## 2024-08-21 NOTE — PROGRESS NOTE ADULT - SUBJECTIVE AND OBJECTIVE BOX
Severo Hernandez MD  Interventional Cardiology / Advance Heart Failure and Cardiac Transplant Specialist  [place] Office : [address] [place] [address] [facility] [address]  Tel: [phone]  [place] Office : [address] [facility] [place] [address]  Tel: [phone]       Pt is lying in bed comfortable not in distress, no chest pains no SOB no palpitations  	  MEDICATIONS:  apixaban 5 milliGRAM(s) Oral every 12 hours  aspirin enteric coated 81 milliGRAM(s) Oral daily  carvedilol 25 milliGRAM(s) Oral every 12 hours      atorvastatin 40 milliGRAM(s) Oral at bedtime  dextrose 50% Injectable 12.5 Gram(s) IV Push once  dextrose 50% Injectable 25 Gram(s) IV Push once  dextrose 50% Injectable 25 Gram(s) IV Push once  dextrose Oral Gel 15 Gram(s) Oral once PRN  glucagon  Injectable 1 milliGRAM(s) IntraMuscular once  insulin lispro (ADMELOG) corrective regimen sliding scale   SubCutaneous every 6 hours    dextrose 5% + sodium chloride 0.9%. 1000 milliLiter(s) IV Continuous <Continuous>  dextrose 5%. 1000 milliLiter(s) IV Continuous <Continuous>  dextrose 5%. 1000 milliLiter(s) IV Continuous <Continuous>  tamsulosin 0.4 milliGRAM(s) Oral at bedtime      PAST MEDICAL/SURGICAL HISTORY  PAST MEDICAL & SURGICAL HISTORY:  Type 2 diabetes mellitus      CVA (cerebrovascular accident)      HTN (hypertension)      History of BPH      No significant past surgical history          SOCIAL HISTORY: Substance Use (street drugs): ( x ) never used  (  ) other:    FAMILY HISTORY:           PHYSICAL EXAM:  T(C): 36.4 (08-21-24 @ 11:32), Max: 36.9 (08-21-24 @ 00:29)  HR: 63 (08-21-24 @ 11:32) (63 - 78)  BP: 163/70 (08-21-24 @ 11:32) (157/83 - 163/70)  RR: 18 (08-21-24 @ 11:32) (16 - 18)  SpO2: 99% (08-21-24 @ 11:32) (95% - 99%)  Wt(kg): --  I&O's Summary    20 Aug 2024 07:01  -  21 Aug 2024 07:00  --------------------------------------------------------  IN: 60 mL / OUT: 800 mL / NET: -740 mL          GENERAL: NAD  EYES:   PERRLA   ENMT:   Moist mucous membranes, Good dentition, No lesions  Cardiovascular: Normal S1 S2, No JVD, No murmurs, No edema  Respiratory: Lungs clear to auscultation	  Gastrointestinal:  Soft, Non-tender, + BS	  Extremities: no edema                08-21    140  |  109<H>  |  15  ----------------------------<  120<H>  3.5   |  20<L>  |  0.94    Ca    9.5      21 Aug 2024 06:30  Phos  2.7     08-21  Mg     1.70     08-21      proBNP:   Lipid Profile:   HgA1c:   TSH:     Consultant(s) Notes Reviewed:  [x ] YES  [ ] NO    Care Discussed with Consultants/Other Providers [ x] YES  [ ] NO    Imaging Personally Reviewed independently:  [x] YES  [ ] NO    All labs, radiologic studies, vitals, orders and medications list reviewed. Patient is seen and examined at bedside. Case discussed with medical team.

## 2024-08-22 LAB
ANION GAP SERPL CALC-SCNC: 16 MMOL/L — HIGH (ref 7–14)
BUN SERPL-MCNC: 9 MG/DL — SIGNIFICANT CHANGE UP (ref 7–23)
CALCIUM SERPL-MCNC: 10.2 MG/DL — SIGNIFICANT CHANGE UP (ref 8.4–10.5)
CHLORIDE SERPL-SCNC: 106 MMOL/L — SIGNIFICANT CHANGE UP (ref 98–107)
CO2 SERPL-SCNC: 20 MMOL/L — LOW (ref 22–31)
CREAT SERPL-MCNC: 0.85 MG/DL — SIGNIFICANT CHANGE UP (ref 0.5–1.3)
EGFR: 91 ML/MIN/1.73M2 — SIGNIFICANT CHANGE UP
GLUCOSE BLDC GLUCOMTR-MCNC: 106 MG/DL — HIGH (ref 70–99)
GLUCOSE BLDC GLUCOMTR-MCNC: 131 MG/DL — HIGH (ref 70–99)
GLUCOSE BLDC GLUCOMTR-MCNC: 134 MG/DL — HIGH (ref 70–99)
GLUCOSE BLDC GLUCOMTR-MCNC: 142 MG/DL — HIGH (ref 70–99)
GLUCOSE BLDC GLUCOMTR-MCNC: 172 MG/DL — HIGH (ref 70–99)
GLUCOSE SERPL-MCNC: 116 MG/DL — HIGH (ref 70–99)
MAGNESIUM SERPL-MCNC: 1.9 MG/DL — SIGNIFICANT CHANGE UP (ref 1.6–2.6)
PHOSPHATE SERPL-MCNC: 2.4 MG/DL — LOW (ref 2.5–4.5)
POTASSIUM SERPL-MCNC: 3.8 MMOL/L — SIGNIFICANT CHANGE UP (ref 3.5–5.3)
POTASSIUM SERPL-SCNC: 3.8 MMOL/L — SIGNIFICANT CHANGE UP (ref 3.5–5.3)
SODIUM SERPL-SCNC: 142 MMOL/L — SIGNIFICANT CHANGE UP (ref 135–145)

## 2024-08-22 RX ORDER — ENALAPRIL MALEATE 5 MG/1
5 TABLET ORAL DAILY
Refills: 0 | Status: DISCONTINUED | OUTPATIENT
Start: 2024-08-22 | End: 2024-08-23

## 2024-08-22 RX ORDER — METOPROLOL TARTRATE 100 MG/1
5 TABLET ORAL EVERY 6 HOURS
Refills: 0 | Status: DISCONTINUED | OUTPATIENT
Start: 2024-08-22 | End: 2024-08-24

## 2024-08-22 RX ORDER — HYDRALAZINE HCL 50 MG
10 TABLET ORAL EVERY 6 HOURS
Refills: 0 | Status: DISCONTINUED | OUTPATIENT
Start: 2024-08-22 | End: 2024-08-24

## 2024-08-22 RX ORDER — HYDRALAZINE HCL 50 MG
10 TABLET ORAL EVERY 6 HOURS
Refills: 0 | Status: DISCONTINUED | OUTPATIENT
Start: 2024-08-22 | End: 2024-08-22

## 2024-08-22 RX ORDER — LORAZEPAM 4 MG/ML
0.5 INJECTION INTRAMUSCULAR; INTRAVENOUS ONCE
Refills: 0 | Status: DISCONTINUED | OUTPATIENT
Start: 2024-08-22 | End: 2024-08-22

## 2024-08-22 RX ADMIN — METOPROLOL TARTRATE 5 MILLIGRAM(S): 100 TABLET ORAL at 13:33

## 2024-08-22 RX ADMIN — Medication 10 MILLIGRAM(S): at 17:19

## 2024-08-22 RX ADMIN — ENOXAPARIN SODIUM 60 MILLIGRAM(S): 100 INJECTION SUBCUTANEOUS at 17:21

## 2024-08-22 RX ADMIN — LORAZEPAM 0.5 MILLIGRAM(S): 4 INJECTION INTRAMUSCULAR; INTRAVENOUS at 23:27

## 2024-08-22 RX ADMIN — Medication 1: at 17:54

## 2024-08-22 RX ADMIN — METOPROLOL TARTRATE 2.5 MILLIGRAM(S): 100 TABLET ORAL at 05:14

## 2024-08-22 RX ADMIN — ENOXAPARIN SODIUM 60 MILLIGRAM(S): 100 INJECTION SUBCUTANEOUS at 05:14

## 2024-08-22 RX ADMIN — LORAZEPAM 0.5 MILLIGRAM(S): 4 INJECTION INTRAMUSCULAR; INTRAVENOUS at 22:19

## 2024-08-22 RX ADMIN — METOPROLOL TARTRATE 5 MILLIGRAM(S): 100 TABLET ORAL at 20:02

## 2024-08-22 NOTE — PROGRESS NOTE ADULT - SUBJECTIVE AND OBJECTIVE BOX
SUBJECTIVE / OVERNIGHT EVENTS:pt seen and examined  08-22-24     MEDICATIONS  (STANDING):  dextrose 5% + sodium chloride 0.9%. 1000 milliLiter(s) (50 mL/Hr) IV Continuous <Continuous>  dextrose 5%. 1000 milliLiter(s) (100 mL/Hr) IV Continuous <Continuous>  dextrose 5%. 1000 milliLiter(s) (50 mL/Hr) IV Continuous <Continuous>  dextrose 50% Injectable 25 Gram(s) IV Push once  dextrose 50% Injectable 25 Gram(s) IV Push once  dextrose 50% Injectable 12.5 Gram(s) IV Push once  enalapril 5 milliGRAM(s) Oral daily  enoxaparin Injectable 60 milliGRAM(s) SubCutaneous every 12 hours  glucagon  Injectable 1 milliGRAM(s) IntraMuscular once  hydrALAZINE Injectable 10 milliGRAM(s) IV Push every 6 hours  insulin lispro (ADMELOG) corrective regimen sliding scale   SubCutaneous every 6 hours  metoprolol tartrate Injectable 5 milliGRAM(s) IV Push every 6 hours    MEDICATIONS  (PRN):  dextrose Oral Gel 15 Gram(s) Oral once PRN Blood Glucose LESS THAN 70 milliGRAM(s)/deciliter    Vital Signs Last 24 Hrs  T(C): 37.1 (08-22-24 @ 20:03), Max: 37.1 (08-21-24 @ 23:52)  T(F): 98.8 (08-22-24 @ 20:03), Max: 98.8 (08-22-24 @ 20:03)  HR: 89 (08-22-24 @ 20:03) (65 - 93)  BP: 146/89 (08-22-24 @ 20:03) (116/82 - 189/88)  BP(mean): 119 (08-22-24 @ 11:35) (119 - 119)  RR: 18 (08-22-24 @ 20:03) (18 - 18)  SpO2: 100% (08-22-24 @ 20:03) (98% - 100%)          Constitutional: No fever, fatigue  Skin: No rash.  Eyes: No recent vision problems or eye pain.  ENT: No congestion, ear pain, or sore throat.  Cardiovascular: No chest pain or palpation.  Respiratory: No cough, shortness of breath, congestion, or wheezing.  Gastrointestinal: No abdominal pain, nausea, vomiting, or diarrhea.  Genitourinary: No dysuria.  Musculoskeletal: No joint swelling.  Neurologic: No headache.    PHYSICAL EXAM:  GENERAL: NAD  EYES: EOMI, PERRLA  NECK: Supple, No JVD  CHEST/LUNG: dec breath sounds at bases  HEART:  S1 , S2 +  ABDOMEN: soft, bs+  EXTREMITIES:  no edema  NEUROLOGY:alert awake    LABS:  08-22    142  |  106  |  9   ----------------------------<  116<H>  3.8   |  20<L>  |  0.85    Ca    10.2      22 Aug 2024 04:45  Phos  2.4     08-22  Mg     1.90     08-22      Creatinine Trend: 0.85 <--, 0.94 <--, 0.97 <--, 1.00 <--, 1.15 <--    Urine Studies:  Urinalysis Basic - ( 22 Aug 2024 04:45 )    Color:  / Appearance:  / SG:  / pH:   Gluc: 116 mg/dL / Ketone:   / Bili:  / Urobili:    Blood:  / Protein:  / Nitrite:    Leuk Esterase:  / RBC:  / WBC    Sq Epi:  / Non Sq Epi:  / Bacteria:                                   RADIOLOGY & ADDITIONAL TESTS:    Imaging Personally Reviewed:    Consultant(s) Notes Reviewed:      Care Discussed with Consultants/Other Providers:

## 2024-08-22 NOTE — PROGRESS NOTE ADULT - SUBJECTIVE AND OBJECTIVE BOX
Severo Hernandez MD  Interventional Cardiology / Advance Heart Failure and Cardiac Transplant Specialist  [place] Office : [address] [place] [address] [place] [address]  Tel:   [place] Office : [address] [facility] [place] [address]  Tel: [phone]       Pt is lying in bed comfortable not in distress, no chest pains no SOB no palpitations  	  MEDICATIONS:  enalapril 5 milliGRAM(s) Oral daily  enoxaparin Injectable 60 milliGRAM(s) SubCutaneous every 12 hours  metoprolol tartrate Injectable 5 milliGRAM(s) IV Push every 6 hours    dextrose 50% Injectable 12.5 Gram(s) IV Push once  dextrose 50% Injectable 25 Gram(s) IV Push once  dextrose 50% Injectable 25 Gram(s) IV Push once  dextrose Oral Gel 15 Gram(s) Oral once PRN  glucagon  Injectable 1 milliGRAM(s) IntraMuscular once  insulin lispro (ADMELOG) corrective regimen sliding scale   SubCutaneous every 6 hours    dextrose 5% + sodium chloride 0.9%. 1000 milliLiter(s) IV Continuous <Continuous>  dextrose 5%. 1000 milliLiter(s) IV Continuous <Continuous>  dextrose 5%. 1000 milliLiter(s) IV Continuous <Continuous>      PAST MEDICAL/SURGICAL HISTORY  PAST MEDICAL & SURGICAL HISTORY:  Type 2 diabetes mellitus      CVA (cerebrovascular accident)      HTN (hypertension)      History of BPH      No significant past surgical history          SOCIAL HISTORY: Substance Use (street drugs): ( x ) never used  (  ) other:    FAMILY HISTORY:      REVIEW OF SYSTEMS:  CONSTITUTIONAL: No fever, weight loss, or fatigue  EYES: No eye pain, visual disturbances, or discharge  ENMT:  No difficulty hearing, tinnitus, vertigo; No sinus or throat pain  BREASTS: No pain, masses, or nipple discharge  GASTROINTESTINAL: No abdominal or epigastric pain. No nausea, vomiting, or hematemesis; No diarrhea or constipation. No melena or hematochezia.  GENITOURINARY: No dysuria, frequency, hematuria, or incontinence  NEUROLOGICAL: No headaches, memory loss, loss of strength, numbness, or tremors  ENDOCRINE: No heat or cold intolerance; No hair loss  MUSCULOSKELETAL: No joint pain or swelling; No muscle, back, or extremity pain  PSYCHIATRIC: No depression, anxiety, mood swings, or difficulty sleeping  HEME/LYMPH: No easy bruising, or bleeding gums       PHYSICAL EXAM:  T(C): 36.6 (08-22-24 @ 11:35), Max: 37.1 (08-21-24 @ 23:52)  HR: 73 (08-22-24 @ 11:35) (65 - 74)  BP: 188/97 (08-22-24 @ 11:35) (176/79 - 188/97)  RR: 18 (08-22-24 @ 11:35) (18 - 18)  SpO2: 100% (08-22-24 @ 11:35) (99% - 100%)  Wt(kg): --  I&O's Summary    21 Aug 2024 07:01  -  22 Aug 2024 07:00  --------------------------------------------------------  IN: 600 mL / OUT: 450 mL / NET: 150 mL          GENERAL: NAD  EYES:   PERRLA   ENMT:   Moist mucous membranes, Good dentition, No lesions  Cardiovascular: Normal S1 S2, No JVD, No murmurs, No edema  Respiratory: Lungs clear to auscultation	  Gastrointestinal:  Soft, Non-tender, + BS	  Extremities: no edema                08-22    142  |  106  |  9   ----------------------------<  116<H>  3.8   |  20<L>  |  0.85    Ca    10.2      22 Aug 2024 04:45  Phos  2.4     08-22  Mg     1.90     08-22      proBNP:   Lipid Profile:   HgA1c:   TSH:     Consultant(s) Notes Reviewed:  [x ] YES  [ ] NO    Care Discussed with Consultants/Other Providers [ x] YES  [ ] NO    Imaging Personally Reviewed independently:  [x] YES  [ ] NO    All labs, radiologic studies, vitals, orders and medications list reviewed. Patient is seen and examined at bedside. Case discussed with medical team.

## 2024-08-22 NOTE — PROGRESS NOTE ADULT - ASSESSMENT
EKG Afib with RVR @, PVCs  Echo < from: TTE W or WO Ultrasound Enhancing Agent (08.20.24 @ 13:43) >   1. Left ventricular cavity is normal in size. Left ventricular wall thickness is normal. Left ventricular systolic function is mildly decreased with an ejection fraction visually estimated at 50 %. There are no regional wall motion abnormalities seen.   2. There is hypokinesis of the inferolateral wall.   3. Normal left ventricular diastolic function, with normal left ventricular filling pressure.   4. Normal right ventricular cavity size, with normal wall thickness, and normal right ventricular systolic function.   5. Normal left and right atrial size.   6. No pericardial effusion seen.   7. The inferior vena cava is normal in size (normal <2.1cm) with normal inspiratory collapse (normal >50%) consistent with normal right atrial pressure (~3, range 0-5mmHg).   8. Estimated pulmonary artery systolic pressure is 30 mmHg.        A/P    75 y/o Male w/Hx of HTN, BPH. DM2, incontinence on Beta agonist,  HLD, CVA (apx [id number]) currently walker dependent with old facial droop, on carvedilol (TTE Aug 2022 tech diff study, therefore  unable to evaluate LV systo function  but mild diastolic dysfunction -Stage I noted). Pt sent in from cardiologist's office for abnormal EKG. EKG at cardiologist (Dr.Saed Graham) showing afib w/RVR    1. Afib with RVR  - currently SR, rate controlled, occasional PVCs to tele  - Chest CT negative for PE shows RLL ground glass opacities corrolate clinically   - CHADSVAsc score at least 4 has h/o stroke, on Lovenox subq, pt still NPO  - 2d echo shows EF 50 % inferior wall hypokinesis, not a candidate for cath, will do medical therapy cont asa lipitor     2. hx of CVA  - will resume ac, asa, lipitor 40     3. HTN   -BP not controlled with lopressor 5mg Q6 IV  -add hydralazine 10mg IV Q6 for elevated blood pressure    4. DVT ppx-on ac EKG Afib with RVR @, PVCs  Echo < from: TTE W or WO Ultrasound Enhancing Agent (08.20.24 @ 13:43) >   1. Left ventricular cavity is normal in size. Left ventricular wall thickness is normal. Left ventricular systolic function is mildly decreased with an ejection fraction visually estimated at 50 %. There are no regional wall motion abnormalities seen.   2. There is hypokinesis of the inferolateral wall.   3. Normal left ventricular diastolic function, with normal left ventricular filling pressure.   4. Normal right ventricular cavity size, with normal wall thickness, and normal right ventricular systolic function.   5. Normal left and right atrial size.   6. No pericardial effusion seen.   7. The inferior vena cava is normal in size (normal <2.1cm) with normal inspiratory collapse (normal >50%) consistent with normal right atrial pressure (~3, range 0-5mmHg).   8. Estimated pulmonary artery systolic pressure is 30 mmHg.        A/P    73 y/o Male w/Hx of HTN, BPH. DM2, incontinence on Beta agonist,  HLD, CVA (apx [id number]) currently walker dependent with old facial droop, on carvedilol (TTE Aug 2022 tech diff study, therefore  unable to evaluate LV systo function  but mild diastolic dysfunction -Stage I noted). Pt sent in from cardiologist's office for abnormal EKG. EKG at cardiologist (Dr.Saed Graham) showing afib w/RVR    1. Afib with RVR  - currently SR, rate controlled, occasional PVCs to tele  - Chest CT negative for PE shows RLL ground glass opacities corrolate clinically   - CHADSVAsc score at least 4 has h/o stroke, on Lovenox subq, pt still NPO  - 2d echo shows EF 50 % inferior wall hypokinesis, not a candidate for cath, will do medical therapy cont asa lipitor     2. hx of CVA  - will resume ac, asa, lipitor 40     3. HTN   -pt is npo  -BP not controlled with lopressor 5mg Q6 IV  -add hydralazine 10mg IV Q6 for elevated blood pressure    4. DVT ppx-on ac

## 2024-08-23 ENCOUNTER — TRANSCRIPTION ENCOUNTER (OUTPATIENT)
Age: 74
End: 2024-08-23

## 2024-08-23 LAB
ANION GAP SERPL CALC-SCNC: 21 MMOL/L — HIGH (ref 7–14)
BASOPHILS # BLD AUTO: 0.04 K/UL — SIGNIFICANT CHANGE UP (ref 0–0.2)
BASOPHILS NFR BLD AUTO: 0.3 % — SIGNIFICANT CHANGE UP (ref 0–2)
BUN SERPL-MCNC: 20 MG/DL — SIGNIFICANT CHANGE UP (ref 7–23)
CALCIUM SERPL-MCNC: 10.3 MG/DL — SIGNIFICANT CHANGE UP (ref 8.4–10.5)
CHLORIDE SERPL-SCNC: 101 MMOL/L — SIGNIFICANT CHANGE UP (ref 98–107)
CO2 SERPL-SCNC: 16 MMOL/L — LOW (ref 22–31)
CREAT SERPL-MCNC: 1.09 MG/DL — SIGNIFICANT CHANGE UP (ref 0.5–1.3)
EGFR: 71 ML/MIN/1.73M2 — SIGNIFICANT CHANGE UP
EOSINOPHIL # BLD AUTO: 0.03 K/UL — SIGNIFICANT CHANGE UP (ref 0–0.5)
EOSINOPHIL NFR BLD AUTO: 0.2 % — SIGNIFICANT CHANGE UP (ref 0–6)
GLUCOSE BLDC GLUCOMTR-MCNC: 126 MG/DL — HIGH (ref 70–99)
GLUCOSE BLDC GLUCOMTR-MCNC: 132 MG/DL — HIGH (ref 70–99)
GLUCOSE BLDC GLUCOMTR-MCNC: 139 MG/DL — HIGH (ref 70–99)
GLUCOSE BLDC GLUCOMTR-MCNC: 142 MG/DL — HIGH (ref 70–99)
GLUCOSE SERPL-MCNC: 144 MG/DL — HIGH (ref 70–99)
HCT VFR BLD CALC: 45.1 % — SIGNIFICANT CHANGE UP (ref 39–50)
HGB BLD-MCNC: 15.1 G/DL — SIGNIFICANT CHANGE UP (ref 13–17)
IANC: 8.8 K/UL — HIGH (ref 1.8–7.4)
IMM GRANULOCYTES NFR BLD AUTO: 0.2 % — SIGNIFICANT CHANGE UP (ref 0–0.9)
LYMPHOCYTES # BLD AUTO: 17 % — SIGNIFICANT CHANGE UP (ref 13–44)
LYMPHOCYTES # BLD AUTO: 2.09 K/UL — SIGNIFICANT CHANGE UP (ref 1–3.3)
MAGNESIUM SERPL-MCNC: 1.6 MG/DL — SIGNIFICANT CHANGE UP (ref 1.6–2.6)
MCHC RBC-ENTMCNC: 28.9 PG — SIGNIFICANT CHANGE UP (ref 27–34)
MCHC RBC-ENTMCNC: 33.5 GM/DL — SIGNIFICANT CHANGE UP (ref 32–36)
MCV RBC AUTO: 86.4 FL — SIGNIFICANT CHANGE UP (ref 80–100)
MONOCYTES # BLD AUTO: 1.32 K/UL — HIGH (ref 0–0.9)
MONOCYTES NFR BLD AUTO: 10.7 % — SIGNIFICANT CHANGE UP (ref 2–14)
NEUTROPHILS # BLD AUTO: 8.8 K/UL — HIGH (ref 1.8–7.4)
NEUTROPHILS NFR BLD AUTO: 71.6 % — SIGNIFICANT CHANGE UP (ref 43–77)
NRBC # BLD: 0 /100 WBCS — SIGNIFICANT CHANGE UP (ref 0–0)
NRBC # FLD: 0 K/UL — SIGNIFICANT CHANGE UP (ref 0–0)
PHOSPHATE SERPL-MCNC: 2.3 MG/DL — LOW (ref 2.5–4.5)
PLATELET # BLD AUTO: 180 K/UL — SIGNIFICANT CHANGE UP (ref 150–400)
POTASSIUM SERPL-MCNC: 3.7 MMOL/L — SIGNIFICANT CHANGE UP (ref 3.5–5.3)
POTASSIUM SERPL-SCNC: 3.7 MMOL/L — SIGNIFICANT CHANGE UP (ref 3.5–5.3)
RBC # BLD: 5.22 M/UL — SIGNIFICANT CHANGE UP (ref 4.2–5.8)
RBC # FLD: 12.8 % — SIGNIFICANT CHANGE UP (ref 10.3–14.5)
SODIUM SERPL-SCNC: 138 MMOL/L — SIGNIFICANT CHANGE UP (ref 135–145)
WBC # BLD: 12.31 K/UL — HIGH (ref 3.8–10.5)
WBC # FLD AUTO: 12.31 K/UL — HIGH (ref 3.8–10.5)

## 2024-08-23 RX ORDER — POTASSIUM CHLORIDE 10 MEQ
10 TABLET, EXT RELEASE, PARTICLES/CRYSTALS ORAL ONCE
Refills: 0 | Status: COMPLETED | OUTPATIENT
Start: 2024-08-23 | End: 2024-08-23

## 2024-08-23 RX ADMIN — METOPROLOL TARTRATE 5 MILLIGRAM(S): 100 TABLET ORAL at 12:28

## 2024-08-23 RX ADMIN — Medication 10 MILLIGRAM(S): at 23:22

## 2024-08-23 RX ADMIN — Medication 10 MILLIGRAM(S): at 05:53

## 2024-08-23 RX ADMIN — ENOXAPARIN SODIUM 60 MILLIGRAM(S): 100 INJECTION SUBCUTANEOUS at 17:19

## 2024-08-23 RX ADMIN — Medication 10 MILLIGRAM(S): at 17:20

## 2024-08-23 RX ADMIN — METOPROLOL TARTRATE 5 MILLIGRAM(S): 100 TABLET ORAL at 17:19

## 2024-08-23 RX ADMIN — Medication 100 MILLIEQUIVALENT(S): at 08:42

## 2024-08-23 RX ADMIN — METOPROLOL TARTRATE 5 MILLIGRAM(S): 100 TABLET ORAL at 05:53

## 2024-08-23 RX ADMIN — Medication 10 MILLIGRAM(S): at 00:43

## 2024-08-23 RX ADMIN — Medication 50 MILLILITER(S): at 08:45

## 2024-08-23 RX ADMIN — Medication 10 MILLIGRAM(S): at 12:34

## 2024-08-23 RX ADMIN — METOPROLOL TARTRATE 5 MILLIGRAM(S): 100 TABLET ORAL at 23:22

## 2024-08-23 RX ADMIN — ENOXAPARIN SODIUM 60 MILLIGRAM(S): 100 INJECTION SUBCUTANEOUS at 05:54

## 2024-08-23 RX ADMIN — METOPROLOL TARTRATE 5 MILLIGRAM(S): 100 TABLET ORAL at 00:42

## 2024-08-23 NOTE — PROGRESS NOTE ADULT - SUBJECTIVE AND OBJECTIVE BOX
SUBJECTIVE / OVERNIGHT EVENTS:pt seen and examined  08-23-24     MEDICATIONS  (STANDING):  dextrose 5% + sodium chloride 0.9%. 1000 milliLiter(s) (50 mL/Hr) IV Continuous <Continuous>  dextrose 5%. 1000 milliLiter(s) (100 mL/Hr) IV Continuous <Continuous>  dextrose 5%. 1000 milliLiter(s) (50 mL/Hr) IV Continuous <Continuous>  dextrose 50% Injectable 25 Gram(s) IV Push once  dextrose 50% Injectable 25 Gram(s) IV Push once  dextrose 50% Injectable 12.5 Gram(s) IV Push once  enalapril 5 milliGRAM(s) Oral daily  enoxaparin Injectable 60 milliGRAM(s) SubCutaneous every 12 hours  glucagon  Injectable 1 milliGRAM(s) IntraMuscular once  hydrALAZINE Injectable 10 milliGRAM(s) IV Push every 6 hours  insulin lispro (ADMELOG) corrective regimen sliding scale   SubCutaneous every 6 hours  metoprolol tartrate Injectable 5 milliGRAM(s) IV Push every 6 hours    MEDICATIONS  (PRN):  dextrose Oral Gel 15 Gram(s) Oral once PRN Blood Glucose LESS THAN 70 milliGRAM(s)/deciliter    Vital Signs Last 24 Hrs  T(C): 36.8 (08-23-24 @ 22:30), Max: 36.8 (08-23-24 @ 00:43)  T(F): 98.3 (08-23-24 @ 22:30), Max: 98.3 (08-23-24 @ 22:30)  HR: 72 (08-23-24 @ 22:30) (63 - 93)  BP: 138/75 (08-23-24 @ 22:30) (117/88 - 138/75)  BP(mean): --  RR: 18 (08-23-24 @ 22:30) (17 - 18)  SpO2: 100% (08-23-24 @ 22:30) (99% - 100%)          PHYSICAL EXAM:  GENERAL: NAD  EYES: EOMI, PERRLA  NECK: Supple, No JVD  CHEST/LUNG: dec breath sounds at bases  HEART:  S1 , S2 +  ABDOMEN: soft, bs+  EXTREMITIES:  no edema  NEUROLOGY:alert awake CONFUSED    LABS:  08-23    138  |  101  |  20  ----------------------------<  144<H>  3.7   |  16<L>  |  1.09    Ca    10.3      23 Aug 2024 05:35  Phos  2.3     08-23  Mg     1.60     08-23      Creatinine Trend: 1.09 <--, 0.85 <--, 0.94 <--, 0.97 <--, 1.00 <--, 1.15 <--                        15.1   12.31 )-----------( 180      ( 23 Aug 2024 05:35 )             45.1     Urine Studies:  Urinalysis Basic - ( 23 Aug 2024 05:35 )    Color:  / Appearance:  / SG:  / pH:   Gluc: 144 mg/dL / Ketone:   / Bili:  / Urobili:    Blood:  / Protein:  / Nitrite:    Leuk Esterase:  / RBC:  / WBC    Sq Epi:  / Non Sq Epi:  / Bacteria:                                                 RADIOLOGY & ADDITIONAL TESTS:    Imaging Personally Reviewed:    Consultant(s) Notes Reviewed:      Care Discussed with Consultants/Other Providers:

## 2024-08-23 NOTE — DISCHARGE NOTE PROVIDER - NSDCCPCAREPLAN_GEN_ALL_CORE_FT
PRINCIPAL DISCHARGE DIAGNOSIS  Diagnosis: Atrial fibrillation with RVR  Assessment and Plan of Treatment: currently SR, rate controlled, occasional PVCs to tele  Chest CT negative for PE shows RLL ground glass opacities corrolate clinically   2d echo shows EF 50 % inferior wall hypokinesis, not a candidate for cath  Continue asa lipitor         SECONDARY DISCHARGE DIAGNOSES  Diagnosis: DM (diabetes mellitus)  Assessment and Plan of Treatment: Continue your medication regimen and a consistent carbohydrate diet (Meaning eating the same amount of carbohydrates at the same time each day). Monitor blood glucose levels throughout the day before meals and at bedtime. Record blood sugars and bring to outpatient providers appointment in order to be reviewed by your doctor for management modifications. If your sugars are more than 400 or less than 70 you should contact your PCP immediately. Monitor for signs/symptoms of low blood glucose, such as, dizziness, altered mental status, or cool/clammy skin. In addition, monitor for signs/symptoms of high blood glucose, such as, feeling hot, dry, fatigued, or with increased thirst/urination. Monitor finger sticks pre-meal and bedtime, low salt, fat and carbohydrate diet, minimize glucose intake.  Exercise daily for at least 30 minutes and weight loss.  Follow up with primary care physician and endocrinologist for routine Hemoglobin A1C checks and management.  Follow up with your ophthalmologist for routine yearly vision exams.Make regular podiatry appointments in order to have feet checked for wounds and uncontrolled toe nail growth to prevent infections, as well as, appointments with an ophthalmologist to monitor your vision.       PRINCIPAL DISCHARGE DIAGNOSIS  Diagnosis: Atrial fibrillation with RVR  Assessment and Plan of Treatment: You were found to have an arrhytmia called atrial fibrillation for which you were started on coreg 25mg bID and are currently in a normal sinus rhtymn rate w/, occasional PVCs to tele. You were also started on Eliquis 5mg bid for anitcoagulation   - 2d echo shows EF 50 % inferior wall hypokinesis, not a candidate for cath  - cont asa, lipitor 40, coreg 25 bid      SECONDARY DISCHARGE DIAGNOSES  Diagnosis: DM (diabetes mellitus)  Assessment and Plan of Treatment: Continue your medication regimen and a consistent carbohydrate diet (Meaning eating the same amount of carbohydrates at the same time each day). Monitor blood glucose levels throughout the day before meals and at bedtime. Record blood sugars and bring to outpatient providers appointment in order to be reviewed by your doctor for management modifications. If your sugars are more than 400 or less than 70 you should contact your PCP immediately. Monitor for signs/symptoms of low blood glucose, such as, dizziness, altered mental status, or cool/clammy skin. In addition, monitor for signs/symptoms of high blood glucose, such as, feeling hot, dry, fatigued, or with increased thirst/urination. Monitor finger sticks pre-meal and bedtime, low salt, fat and carbohydrate diet, minimize glucose intake.  Exercise daily for at least 30 minutes and weight loss.  Follow up with primary care physician and endocrinologist for routine Hemoglobin A1C checks and management.  Follow up with your ophthalmologist for routine yearly vision exams.Make regular podiatry appointments in order to have feet checked for wounds and uncontrolled toe nail growth to prevent infections, as well as, appointments with an ophthalmologist to monitor your vision.      Diagnosis: Dysphagia  Assessment and Plan of Treatment: You underwent a Speech and Swallow evaluation which showed risk of aspiration therefore diet was changed to Pureed with Moderately thickened liquids.

## 2024-08-23 NOTE — PROGRESS NOTE ADULT - SUBJECTIVE AND OBJECTIVE BOX
Severo Hernandez MD  Interventional Cardiology / Advance Heart Failure and Cardiac Transplant Specialist  [place] Office : [address] [place] [address] [facility] [address]  Tel: [phone]  [place] Office : [address] [facility] [place] [address]  Tel: [phone]       Pt is lying in bed comfortable not in distress, no chest pains no SOB no palpitations  	  MEDICATIONS:  enoxaparin Injectable 60 milliGRAM(s) SubCutaneous every 12 hours  hydrALAZINE Injectable 10 milliGRAM(s) IV Push every 6 hours  metoprolol tartrate Injectable 5 milliGRAM(s) IV Push every 6 hours            dextrose 50% Injectable 25 Gram(s) IV Push once  dextrose 50% Injectable 25 Gram(s) IV Push once  dextrose 50% Injectable 12.5 Gram(s) IV Push once  dextrose Oral Gel 15 Gram(s) Oral once PRN  glucagon  Injectable 1 milliGRAM(s) IntraMuscular once  insulin lispro (ADMELOG) corrective regimen sliding scale   SubCutaneous every 6 hours    dextrose 5% + sodium chloride 0.9%. 1000 milliLiter(s) IV Continuous <Continuous>  dextrose 5%. 1000 milliLiter(s) IV Continuous <Continuous>  dextrose 5%. 1000 milliLiter(s) IV Continuous <Continuous>      PAST MEDICAL/SURGICAL HISTORY  PAST MEDICAL & SURGICAL HISTORY:  Type 2 diabetes mellitus      CVA (cerebrovascular accident)      HTN (hypertension)      History of BPH      No significant past surgical history          SOCIAL HISTORY: Substance Use (street drugs): ( x ) never used  (  ) other:    FAMILY HISTORY:         PHYSICAL EXAM:  T(C): 36.7 (08-23-24 @ 05:40), Max: 37.1 (08-22-24 @ 20:00)  HR: 93 (08-23-24 @ 05:40) (72 - 93)  BP: 126/82 (08-23-24 @ 05:40) (116/82 - 189/88)  RR: 18 (08-23-24 @ 05:40) (17 - 18)  SpO2: 99% (08-23-24 @ 05:40) (98% - 100%)  Wt(kg): --  I&O's Summary        GENERAL: NAD  EYES:   PERRLA   ENMT:   Moist mucous membranes, Good dentition, No lesions  Cardiovascular: Normal S1 S2, No JVD, No murmurs, No edema  Respiratory: Lungs clear to auscultation	  Gastrointestinal:  Soft, Non-tender, + BS	  Extremities: no edema                                    15.1   12.31 )-----------( 180      ( 23 Aug 2024 05:35 )             45.1     08-23    138  |  101  |  20  ----------------------------<  144<H>  3.7   |  16<L>  |  1.09    Ca    10.3      23 Aug 2024 05:35  Phos  2.3     08-23  Mg     1.60     08-23      proBNP:   Lipid Profile:   HgA1c:   TSH:     Consultant(s) Notes Reviewed:  [x ] YES  [ ] NO    Care Discussed with Consultants/Other Providers [ x] YES  [ ] NO    Imaging Personally Reviewed independently:  [x] YES  [ ] NO    All labs, radiologic studies, vitals, orders and medications list reviewed. Patient is seen and examined at bedside. Case discussed with medical team.         Severo Hernandez MD  Interventional Cardiology / Advance Heart Failure and Cardiac Transplant Specialist  [place] Office : [address] [place] [address] [facility] [address]  Tel: [phone]  [place] Office : [address] [facility] [place] [address]  Tel: [phone]       Pt is lying in bed comfortable not in distress, no chest pains no SOB no palpitations  	  MEDICATIONS:  enoxaparin Injectable 60 milliGRAM(s) SubCutaneous every 12 hours  hydrALAZINE Injectable 10 milliGRAM(s) IV Push every 6 hours  metoprolol tartrate Injectable 5 milliGRAM(s) IV Push every 6 hours      dextrose 50% Injectable 25 Gram(s) IV Push once  dextrose 50% Injectable 25 Gram(s) IV Push once  dextrose 50% Injectable 12.5 Gram(s) IV Push once  dextrose Oral Gel 15 Gram(s) Oral once PRN  glucagon  Injectable 1 milliGRAM(s) IntraMuscular once  insulin lispro (ADMELOG) corrective regimen sliding scale   SubCutaneous every 6 hours    dextrose 5% + sodium chloride 0.9%. 1000 milliLiter(s) IV Continuous <Continuous>  dextrose 5%. 1000 milliLiter(s) IV Continuous <Continuous>  dextrose 5%. 1000 milliLiter(s) IV Continuous <Continuous>      PAST MEDICAL/SURGICAL HISTORY  PAST MEDICAL & SURGICAL HISTORY:  Type 2 diabetes mellitus      CVA (cerebrovascular accident)      HTN (hypertension)      History of BPH      No significant past surgical history          SOCIAL HISTORY: Substance Use (street drugs): ( x ) never used  (  ) other:    FAMILY HISTORY:         PHYSICAL EXAM:  T(C): 36.7 (08-23-24 @ 05:40), Max: 37.1 (08-22-24 @ 20:00)  HR: 93 (08-23-24 @ 05:40) (72 - 93)  BP: 126/82 (08-23-24 @ 05:40) (116/82 - 189/88)  RR: 18 (08-23-24 @ 05:40) (17 - 18)  SpO2: 99% (08-23-24 @ 05:40) (98% - 100%)  Wt(kg): --  I&O's Summary        GENERAL: NAD  EYES:   PERRLA   ENMT:   Moist mucous membranes, Good dentition, No lesions  Cardiovascular: irregular, No JVD, No murmurs, No edema  Respiratory: b/l decreased lung sound	  Gastrointestinal:  Soft, Non-tender, + BS	  Extremities: no edema                                    15.1   12.31 )-----------( 180      ( 23 Aug 2024 05:35 )             45.1     08-23    138  |  101  |  20  ----------------------------<  144<H>  3.7   |  16<L>  |  1.09    Ca    10.3      23 Aug 2024 05:35  Phos  2.3     08-23  Mg     1.60     08-23      proBNP:   Lipid Profile:   HgA1c:   TSH:     Consultant(s) Notes Reviewed:  [x ] YES  [ ] NO    Care Discussed with Consultants/Other Providers [ x] YES  [ ] NO    Imaging Personally Reviewed independently:  [x] YES  [ ] NO    All labs, radiologic studies, vitals, orders and medications list reviewed. Patient is seen and examined at bedside. Case discussed with medical team.

## 2024-08-23 NOTE — PROGRESS NOTE ADULT - ASSESSMENT
EKG Afib with RVR @, PVCs  Echo < from: TTE W or WO Ultrasound Enhancing Agent (08.20.24 @ 13:43) >   1. Left ventricular cavity is normal in size. Left ventricular wall thickness is normal. Left ventricular systolic function is mildly decreased with an ejection fraction visually estimated at 50 %. There are no regional wall motion abnormalities seen.   2. There is hypokinesis of the inferolateral wall.   3. Normal left ventricular diastolic function, with normal left ventricular filling pressure.   4. Normal right ventricular cavity size, with normal wall thickness, and normal right ventricular systolic function.   5. Normal left and right atrial size.   6. No pericardial effusion seen.   7. The inferior vena cava is normal in size (normal <2.1cm) with normal inspiratory collapse (normal >50%) consistent with normal right atrial pressure (~3, range 0-5mmHg).   8. Estimated pulmonary artery systolic pressure is 30 mmHg.        A/P    75 y/o Male w/Hx of HTN, BPH. DM2, incontinence on Beta agonist,  HLD, CVA (apx [id number]) currently walker dependent with old facial droop, on carvedilol (TTE Aug 2022 tech diff study, therefore  unable to evaluate LV systo function  but mild diastolic dysfunction -Stage I noted). Pt sent in from cardiologist's office for abnormal EKG. EKG at cardiologist (Dr.Saed Graham) showing afib w/RVR    1. Afib with RVR  - currently SR, rate controlled, occasional PVCs to tele  - Chest CT negative for PE shows RLL ground glass opacities corrolate clinically   - CHADSVAsc score at least 4 has h/o stroke, on Lovenox subq, pt still NPO  - 2d echo shows EF 50 % inferior wall hypokinesis, not a candidate for cath, will do medical therapy cont asa lipitor     2. hx of CVA  - will resume ac, asa, lipitor 40     3. HTN   -pt is npo  -c/w lopressor 5mg Q6 IV  -BP better with hydralazine 10mg IV Q6     4. DVT ppx-on lovenox EKG Afib with RVR @, PVCs  Echo < from: TTE W or WO Ultrasound Enhancing Agent (08.20.24 @ 13:43) >   1. Left ventricular cavity is normal in size. Left ventricular wall thickness is normal. Left ventricular systolic function is mildly decreased with an ejection fraction visually estimated at 50 %. There are no regional wall motion abnormalities seen.   2. There is hypokinesis of the inferolateral wall.   3. Normal left ventricular diastolic function, with normal left ventricular filling pressure.   4. Normal right ventricular cavity size, with normal wall thickness, and normal right ventricular systolic function.   5. Normal left and right atrial size.   6. No pericardial effusion seen.   7. The inferior vena cava is normal in size (normal <2.1cm) with normal inspiratory collapse (normal >50%) consistent with normal right atrial pressure (~3, range 0-5mmHg).   8. Estimated pulmonary artery systolic pressure is 30 mmHg.        A/P    75 y/o Male w/Hx of HTN, BPH. DM2, incontinence on Beta agonist,  HLD, CVA (apx [id number]) currently walker dependent with old facial droop, on carvedilol (TTE Aug 2022 tech diff study, therefore  unable to evaluate LV systo function  but mild diastolic dysfunction -Stage I noted). Pt sent in from cardiologist's office for abnormal EKG. EKG at cardiologist (Dr.Saed Graham) showing afib w/RVR    1. Afib with RVR  - currently SR, rate controlled, occasional PVCs to tele  - Chest CT negative for PE shows RLL ground glass opacities corrolate clinically   - CHADSVAsc score at least 4 has h/o stroke, on Lovenox subq, pt still NPO  - 2d echo shows EF 50 % inferior wall hypokinesis, not a candidate for cath, will do medical therapy cont asa lipitor     2. hx of CVA  - will resume ac, asa, lipitor 40     3. HTN   -pt is on npo  -c/w lopressor 5mg Q6 IV  -BP better with hydralazine 10mg IV Q6     4. DVT ppx-on lovenox

## 2024-08-23 NOTE — DISCHARGE NOTE PROVIDER - NSDCMRMEDTOKEN_GEN_ALL_CORE_FT
aspirin 81 mg oral delayed release tablet: 1 tab(s) orally once a day  atorvastatin 40 mg oral tablet: 1 tab(s) orally once a day (at bedtime)  bisacodyl 5 mg oral delayed release tablet: 1 tab(s) orally once a day as needed for  constipation  carvedilol 25 mg oral tablet: 1 tab(s) orally 2 times a day  Farxiga 10 mg oral tablet: 1 tab(s) orally once a day  finasteride 5 mg oral tablet: 1 tab(s) orally once a day  Gemtesa 75 mg oral tablet: 1 tab(s) orally once a day  glipiZIDE 5 mg oral tablet: 1 tab(s) orally 2 times a day  lisinopril 20 mg oral tablet: 1 tab(s) orally once a day  tamsulosin 0.4 mg oral capsule: 1 cap(s) orally once a day (at bedtime)   apixaban 5 mg oral tablet: 1 tab(s) orally every 12 hours  aspirin 81 mg oral delayed release tablet: 1 tab(s) orally once a day  atorvastatin 40 mg oral tablet: 1 tab(s) orally once a day (at bedtime)  carvedilol 25 mg oral tablet: 1 tab(s) orally every 12 hours  Farxiga 10 mg oral tablet: 1 tab(s) orally once a day  finasteride 5 mg oral tablet: 1 tab(s) orally once a day  glipiZIDE 5 mg oral tablet: 1 tab(s) orally 2 times a day  lisinopril 20 mg oral tablet: 1 tab(s) orally once a day  tamsulosin 0.4 mg oral capsule: 1 cap(s) orally once a day (at bedtime)

## 2024-08-23 NOTE — DISCHARGE NOTE PROVIDER - HOSPITAL COURSE
Patient is a 75 y/o Male w/Hx of HTN, BPH. DM2, incontinence on Beta agonist,  HLD, CVA (apx [id number]) currently walker dependent with old facial droop, on carvedilol (TTE Aug 2022 tech diff study, therefore  unable to evaluate LV systolic function but mild diastolic dysfunction -Stage I noted). Pt sent in from cardiologist's office for abnormal EKG. EKG at cardiologist (Dr.Saed Graham) showing afib w/RVR      1. Afib with RVR  - currently SR, rate controlled, occasional PVCs to tele  - Chest CT negative for PE shows RLL ground glass opacities corrolate clinically   - CHADSVAsc score at least 4 has h/o stroke, on Lovenox subq, pt still NPO  - 2d echo shows EF 50 % inferior wall hypokinesis, not a candidate for cath, will do medical therapy cont asa lipitor     2. hx of CVA  resume ac, asa, lipitor 40     3. HTN   -pt is on npo  -c/w lopressor 5mg Q6 IV  -BP better with hydralazine 10mg IV Q6                      Patient is a 75 y/o Male w/Hx of HTN, BPH. DM2, incontinence on Beta agonist,  HLD, CVA (apx [id number]) currently walker dependent with old facial droop, on carvedilol (TTE Aug 2022 tech diff study, therefore  unable to evaluate LV systolic function but mild diastolic dysfunction -Stage I noted). Pt sent in from cardiologist's office for abnormal EKG. EKG at cardiologist (Dr.Saed Graham) showing afib w/RVR    1. Afib with RVR  - currently SR, rate controlled, to tele  - Chest CT negative for PE shows RLL ground glass opacities corrolate clinically   - CHADSVAsc score at least 5 has h/o stroke,  on Eliquis 5mg bid  - 2d echo shows EF 50 % inferior wall hypokinesis, not a candidate for cath  - cont asa, lipitor 40, coreg 25 bid    2. hx of CVA  -  ac, asa, lipitor 40     3. HTN   - lisinopril 20mg, coreg 25 bid    4. DVT ppx- ac      Patient medically stable for discharge to rehab d/w Dr. Kaur on 8/28/24.

## 2024-08-23 NOTE — DISCHARGE NOTE PROVIDER - CARE PROVIDER_API CALL
Severo Graham  Cardiovascular Disease  [address] [place] [address]  Phone: [phone]  Fax: [phone]  Follow Up Time:

## 2024-08-24 LAB
ANION GAP SERPL CALC-SCNC: 14 MMOL/L — SIGNIFICANT CHANGE UP (ref 7–14)
BUN SERPL-MCNC: 28 MG/DL — HIGH (ref 7–23)
CALCIUM SERPL-MCNC: 9.5 MG/DL — SIGNIFICANT CHANGE UP (ref 8.4–10.5)
CHLORIDE SERPL-SCNC: 107 MMOL/L — SIGNIFICANT CHANGE UP (ref 98–107)
CO2 SERPL-SCNC: 18 MMOL/L — LOW (ref 22–31)
CREAT SERPL-MCNC: 1.13 MG/DL — SIGNIFICANT CHANGE UP (ref 0.5–1.3)
EGFR: 68 ML/MIN/1.73M2 — SIGNIFICANT CHANGE UP
GLUCOSE BLDC GLUCOMTR-MCNC: 117 MG/DL — HIGH (ref 70–99)
GLUCOSE BLDC GLUCOMTR-MCNC: 137 MG/DL — HIGH (ref 70–99)
GLUCOSE BLDC GLUCOMTR-MCNC: 138 MG/DL — HIGH (ref 70–99)
GLUCOSE BLDC GLUCOMTR-MCNC: 187 MG/DL — HIGH (ref 70–99)
GLUCOSE SERPL-MCNC: 154 MG/DL — HIGH (ref 70–99)
HCT VFR BLD CALC: 41.3 % — SIGNIFICANT CHANGE UP (ref 39–50)
HGB BLD-MCNC: 13.5 G/DL — SIGNIFICANT CHANGE UP (ref 13–17)
MAGNESIUM SERPL-MCNC: 1.9 MG/DL — SIGNIFICANT CHANGE UP (ref 1.6–2.6)
MCHC RBC-ENTMCNC: 28.4 PG — SIGNIFICANT CHANGE UP (ref 27–34)
MCHC RBC-ENTMCNC: 32.7 GM/DL — SIGNIFICANT CHANGE UP (ref 32–36)
MCV RBC AUTO: 86.9 FL — SIGNIFICANT CHANGE UP (ref 80–100)
NRBC # BLD: 0 /100 WBCS — SIGNIFICANT CHANGE UP (ref 0–0)
NRBC # FLD: 0 K/UL — SIGNIFICANT CHANGE UP (ref 0–0)
PHOSPHATE SERPL-MCNC: 2.9 MG/DL — SIGNIFICANT CHANGE UP (ref 2.5–4.5)
PLATELET # BLD AUTO: 173 K/UL — SIGNIFICANT CHANGE UP (ref 150–400)
POTASSIUM SERPL-MCNC: 3.5 MMOL/L — SIGNIFICANT CHANGE UP (ref 3.5–5.3)
POTASSIUM SERPL-SCNC: 3.5 MMOL/L — SIGNIFICANT CHANGE UP (ref 3.5–5.3)
RBC # BLD: 4.75 M/UL — SIGNIFICANT CHANGE UP (ref 4.2–5.8)
RBC # FLD: 13.3 % — SIGNIFICANT CHANGE UP (ref 10.3–14.5)
SODIUM SERPL-SCNC: 139 MMOL/L — SIGNIFICANT CHANGE UP (ref 135–145)
WBC # BLD: 8.2 K/UL — SIGNIFICANT CHANGE UP (ref 3.8–10.5)
WBC # FLD AUTO: 8.2 K/UL — SIGNIFICANT CHANGE UP (ref 3.8–10.5)

## 2024-08-24 RX ORDER — MAGNESIUM OXIDE TAB 400 MG (240 MG ELEMENTAL MG) 400 (240 MG) MG
400 TAB ORAL ONCE
Refills: 0 | Status: COMPLETED | OUTPATIENT
Start: 2024-08-24 | End: 2024-08-24

## 2024-08-24 RX ORDER — FINASTERIDE 1 MG/1
5 TABLET, FILM COATED ORAL DAILY
Refills: 0 | Status: DISCONTINUED | OUTPATIENT
Start: 2024-08-24 | End: 2024-08-28

## 2024-08-24 RX ORDER — POTASSIUM CHLORIDE 10 MEQ
40 TABLET, EXT RELEASE, PARTICLES/CRYSTALS ORAL ONCE
Refills: 0 | Status: COMPLETED | OUTPATIENT
Start: 2024-08-24 | End: 2024-08-24

## 2024-08-24 RX ORDER — TAMSULOSIN HYDROCHLORIDE 0.4 MG/1
0.4 CAPSULE ORAL AT BEDTIME
Refills: 0 | Status: DISCONTINUED | OUTPATIENT
Start: 2024-08-24 | End: 2024-08-28

## 2024-08-24 RX ORDER — CARVEDILOL 6.25 MG/1
25 TABLET ORAL EVERY 12 HOURS
Refills: 0 | Status: DISCONTINUED | OUTPATIENT
Start: 2024-08-24 | End: 2024-08-28

## 2024-08-24 RX ORDER — LISINOPRIL 10 MG/1
20 TABLET ORAL DAILY
Refills: 0 | Status: DISCONTINUED | OUTPATIENT
Start: 2024-08-24 | End: 2024-08-28

## 2024-08-24 RX ORDER — ASPIRIN 81 MG
81 TABLET, DELAYED RELEASE (ENTERIC COATED) ORAL DAILY
Refills: 0 | Status: DISCONTINUED | OUTPATIENT
Start: 2024-08-24 | End: 2024-08-28

## 2024-08-24 RX ADMIN — TAMSULOSIN HYDROCHLORIDE 0.4 MILLIGRAM(S): 0.4 CAPSULE ORAL at 21:07

## 2024-08-24 RX ADMIN — MAGNESIUM OXIDE TAB 400 MG (240 MG ELEMENTAL MG) 400 MILLIGRAM(S): 400 (240 MG) TAB at 12:05

## 2024-08-24 RX ADMIN — LISINOPRIL 20 MILLIGRAM(S): 10 TABLET ORAL at 21:12

## 2024-08-24 RX ADMIN — Medication 40 MILLIGRAM(S): at 21:06

## 2024-08-24 RX ADMIN — Medication 40 MILLIEQUIVALENT(S): at 12:05

## 2024-08-24 RX ADMIN — METOPROLOL TARTRATE 5 MILLIGRAM(S): 100 TABLET ORAL at 12:05

## 2024-08-24 RX ADMIN — CARVEDILOL 25 MILLIGRAM(S): 6.25 TABLET ORAL at 17:38

## 2024-08-24 RX ADMIN — ENOXAPARIN SODIUM 60 MILLIGRAM(S): 100 INJECTION SUBCUTANEOUS at 17:38

## 2024-08-24 RX ADMIN — Medication 10 MILLIGRAM(S): at 05:48

## 2024-08-24 RX ADMIN — ENOXAPARIN SODIUM 60 MILLIGRAM(S): 100 INJECTION SUBCUTANEOUS at 05:47

## 2024-08-24 RX ADMIN — Medication 1: at 12:17

## 2024-08-24 RX ADMIN — Medication 50 MILLILITER(S): at 05:04

## 2024-08-24 RX ADMIN — Medication 10 MILLIGRAM(S): at 12:05

## 2024-08-24 RX ADMIN — METOPROLOL TARTRATE 5 MILLIGRAM(S): 100 TABLET ORAL at 05:48

## 2024-08-24 RX ADMIN — Medication 81 MILLIGRAM(S): at 21:07

## 2024-08-24 RX ADMIN — FINASTERIDE 5 MILLIGRAM(S): 1 TABLET, FILM COATED ORAL at 21:12

## 2024-08-24 NOTE — SWALLOW BEDSIDE ASSESSMENT ADULT - SWALLOW EVAL: RECOMMENDED DIET
June 13, 2023    Cara Mendoza  [address] [place] [address]             [place] [id number] [address] [address]  Phone: [phone]  Fax: [phone] Dear Ms. Reji:    We have attempted to contact you several times to schedule a blood pressure check. Please contact our office to schedule a nurse visit.      If you have any questions or concerns, please don't hesitate to call.    Sincerely,        Giacomo Domingo MD     
1. Consider NPO with consideration for short term non-oral means of nutrition/ hydration/ medication
Puree/Moderately Thick Liquid

## 2024-08-24 NOTE — PROGRESS NOTE ADULT - SUBJECTIVE AND OBJECTIVE BOX
SUBJECTIVE / OVERNIGHT EVENTS:pt seen and examined  08-24--24     MEDICATIONS  (STANDING):  aspirin enteric coated 81 milliGRAM(s) Oral daily  atorvastatin 40 milliGRAM(s) Oral at bedtime  carvedilol 25 milliGRAM(s) Oral every 12 hours  dextrose 5%. 1000 milliLiter(s) (100 mL/Hr) IV Continuous <Continuous>  dextrose 5%. 1000 milliLiter(s) (50 mL/Hr) IV Continuous <Continuous>  dextrose 50% Injectable 25 Gram(s) IV Push once  dextrose 50% Injectable 12.5 Gram(s) IV Push once  dextrose 50% Injectable 25 Gram(s) IV Push once  enoxaparin Injectable 60 milliGRAM(s) SubCutaneous every 12 hours  finasteride 5 milliGRAM(s) Oral daily  glucagon  Injectable 1 milliGRAM(s) IntraMuscular once  insulin lispro (ADMELOG) corrective regimen sliding scale   SubCutaneous three times a day before meals  insulin lispro (ADMELOG) corrective regimen sliding scale   SubCutaneous at bedtime  lisinopril 20 milliGRAM(s) Oral daily  tamsulosin 0.4 milliGRAM(s) Oral at bedtime    MEDICATIONS  (PRN):  dextrose Oral Gel 15 Gram(s) Oral once PRN Blood Glucose LESS THAN 70 milliGRAM(s)/deciliter    Vital Signs Last 24 Hrs  T(C): 36.9 (08-24-24 @ 19:51), Max: 37.2 (08-24-24 @ 14:33)  T(F): 98.4 (08-24-24 @ 19:51), Max: 98.9 (08-24-24 @ 14:33)  HR: 74 (08-24-24 @ 19:51) (60 - 81)  BP: 113/62 (08-24-24 @ 19:51) (113/62 - 141/79)  BP(mean): --  RR: 18 (08-24-24 @ 19:51) (17 - 18)  SpO2: 97% (08-24-24 @ 19:51) (97% - 100%)        PHYSICAL EXAM:  GENERAL: NAD  EYES: EOMI, PERRLA  NECK: Supple, No JVD  CHEST/LUNG: dec breath sounds at bases  HEART:  S1 , S2 +  ABDOMEN: soft, bs+  EXTREMITIES:  no edema  NEUROLOGY:alert awake CONFUSED    LABS:  08-24    139  |  107  |  28<H>  ----------------------------<  154<H>  3.5   |  18<L>  |  1.13    Ca    9.5      24 Aug 2024 07:20  Phos  2.9     08-24  Mg     1.90     08-24      Creatinine Trend: 1.13 <--, 1.09 <--, 0.85 <--, 0.94 <--, 0.97 <--, 1.00 <--, 1.15 <--                        13.5   8.20  )-----------( 173      ( 24 Aug 2024 07:20 )             41.3     Urine Studies:  Urinalysis Basic - ( 24 Aug 2024 07:20 )    Color:  / Appearance:  / SG:  / pH:   Gluc: 154 mg/dL / Ketone:   / Bili:  / Urobili:    Blood:  / Protein:  / Nitrite:    Leuk Esterase:  / RBC:  / WBC    Sq Epi:  / Non Sq Epi:  / Bacteria:                                                           RADIOLOGY & ADDITIONAL TESTS:    Imaging Personally Reviewed:    Consultant(s) Notes Reviewed:      Care Discussed with Consultants/Other Providers:

## 2024-08-24 NOTE — SWALLOW BEDSIDE ASSESSMENT ADULT - ASR SWALLOW RECOMMEND DIAG
Objective testing Not warranted at this time given overt s/s penetration/ aspiration noted and the severity of oral stage deficits.
consider cinesophagram at MD discretion if CT angio chest findings concerning for aspiration related pneumonia

## 2024-08-24 NOTE — SWALLOW BEDSIDE ASSESSMENT ADULT - SWALLOW EVAL: DIAGNOSIS
1. Mild to moderate oral dysphagia for puree, moderately thick, mildly thick, and thin liquids characterized by intermittent reduced stripping of bolus from tsp/retrieval from cup (liquids>solids), suspected premature spillage for mildly thick and thin, slow anterior posterior bolus transport and adequate oral clearance.  2. Functional pharyngeal phase suspected for puree and moderately thick liquid characterized by presence of swallow initiation and hyolaryngeal excursion upon palpation with no overt clinical s/s airway penetration/aspiration. 3. Mild to moderate pharyngeal dysphagia suspected for mildly thick and thin liquids marked by suspected delay of swallow initiation and hyolaryngeal excursion upon palpation and multiple swallows per bolus (~3).
1. Moderate- Severe oral dysphagia for puree characterized by reduced oral grading to utensil with patient opening mouth upon teaspoon presentation with inconsistent labial seal closure to strip/ retrieve bolus requiring SLP to intermittently utilize upper lip to scrape off bolus into oral cavity. Patient with slow prolonged bolus manipulation for puree with slow anterior to posterior transport and mild oral residue noted post swallow for puree. Patient did not attempt to reswallow remaining residue and SLP provided oral suctioning via Yankauer to remove remaining residue. 2. Severe oral dysphagia for moderately thick liquids characterized by reduced labial seal closure to utensil with SLP utilizing upper lip to scrape off bolus which was retrieved in anterior oral cavity/ floor of mouth/ anterior lingual surface, patient with no bolus manipulation with the bolus ultimately suctioned from the oral cavity via Yankauer. continued below

## 2024-08-24 NOTE — PROGRESS NOTE ADULT - ASSESSMENT
EKG Afib with RVR @, PVCs  Echo < from: TTE W or WO Ultrasound Enhancing Agent (08.20.24 @ 13:43) >   1. Left ventricular cavity is normal in size. Left ventricular wall thickness is normal. Left ventricular systolic function is mildly decreased with an ejection fraction visually estimated at 50 %. There are no regional wall motion abnormalities seen.   2. There is hypokinesis of the inferolateral wall.   3. Normal left ventricular diastolic function, with normal left ventricular filling pressure.   4. Normal right ventricular cavity size, with normal wall thickness, and normal right ventricular systolic function.   5. Normal left and right atrial size.   6. No pericardial effusion seen.   7. The inferior vena cava is normal in size (normal <2.1cm) with normal inspiratory collapse (normal >50%) consistent with normal right atrial pressure (~3, range 0-5mmHg).   8. Estimated pulmonary artery systolic pressure is 30 mmHg.        A/P    73 y/o Male w/Hx of HTN, BPH. DM2, incontinence on Beta agonist,  HLD, CVA (apx [id number]) currently walker dependent with old facial droop, on carvedilol (TTE Aug 2022 tech diff study, therefore  unable to evaluate LV systo function  but mild diastolic dysfunction -Stage I noted). Pt sent in from cardiologist's office for abnormal EKG. EKG at cardiologist (Dr.Saed Graham) showing afib w/RVR    1. Afib with RVR  - currently SR, rate controlled, occasional PVCs to tele  - Chest CT negative for PE shows RLL ground glass opacities corrolate clinically   - CHADSVAsc score at least 4 has h/o stroke, on Lovenox subq  - 2d echo shows EF 50 % inferior wall hypokinesis, not a candidate for cath  -  cont asa, lipitor 40, coreg 25 bid    2. hx of CVA  -  ac, asa, lipitor 40 resumed    3. HTN   - lisinopril 20mg, coreg 25 bid, BP better    4. DVT ppx-on lovenox

## 2024-08-24 NOTE — SWALLOW BEDSIDE ASSESSMENT ADULT - COMMENTS
Internal Medicine 8/20, "73 yo m with apparent  new onset afib... son concerned over asp risk; noted some coughing recently with po intake; passed bedside dysphagia screen, and per son request , placed on puree and thick liq. will additionally order formal swallow eval. right sided opacities noted on CTA some of which are GGO; mini rvp neg, pt sat 99 on RA, afebrile, normal wbc; no abx for now."    CT 8/18: IMPRESSION: No pulmonary embolism. Branching nodular opacities in the right upper and lower, which may reflect impacted airways. Right lower lobe superior segment ground glass nodular opacities, may be infectious or inflammatory etiology.    Of Note: Patient known to this service from previous admission, seen 9/24/2023 with recommendations for "Puree with Moderately Thick Liquids; Objective testing not warranted at this time given overt signs of impaired airway protection (cough/throat clears) on mildly thick and thin liquid only and recent CXR 9/22 indicating no acute findings"    Patient received upright sleeping in bed, Lao speaking Language Line utilized ID #[id number]. Patient did not verbally respond and follow simple directives inconsistently (i.e., open eyes). Per discussion with RN patient tolerated breakfast tray this AM (puree/ moderately thick liquids) without difficulty.
Adult Cardiology NP 8/23: "75 y/o Male w/Hx of HTN, BPH. DM2, incontinence on Beta agonist,  HLD, CVA (apx [id number]) currently walker dependent with old facial droop, on carvedilol (TTE Aug 2022 tech diff study, therefore  unable to evaluate LV systo function  but mild diastolic dysfunction -Stage I noted). Pt sent in from cardiologist's office for abnormal EKG. EKG at cardiologist (Dr.Saed Graham) showing afib w/RVR"    CT Angio Chest 8/18: IMPRESSION: No pulmonary embolism. Branching nodular opacities in the right upper and lower, which may reflect impacted airways. Right lower lobe superior segment ground glass nodular opacities, may be infectious or inflammatory etiology.    Of Note 1: Pt known to this service from previous admission, seen 9/24/2023 w/recommendations for puree/moderately thick liquids (see note for details).    Of Note 2: Pt known to this service from this admission, seen 8/20 w/recommendations to consider NPO with consideration for short term non-oral means of nutrition/ hydration/ medication (see note for details).    Pt received asleep, easily wakened to alert state, positioned upright in bed, intermittently nodded however did not follow commands or communicate with [facility] LanguageLine  #[id number]. Upper dentures noted on bedside tray; attempted to place for evaluation however ill-fitting without denture adhesive and resulted in pt gagging/coughing. Dentures removed and left on bedside table in pt's labeled container.

## 2024-08-24 NOTE — PROGRESS NOTE ADULT - SUBJECTIVE AND OBJECTIVE BOX
Severo Hernandez MD  Interventional Cardiology / Endovascular Specialist  [place] Office : [address] [place] [address] [place]. [address]  Tel:   [place] Office : [address] [facility] [place] [address]  Tel: [phone]  Cell : [phone]     Pt is lying in bed comfortable not in distress, no chest pains no SOB no palpitations  	  MEDICATIONS:  aspirin enteric coated 81 milliGRAM(s) Oral daily  carvedilol 25 milliGRAM(s) Oral every 12 hours  enoxaparin Injectable 60 milliGRAM(s) SubCutaneous every 12 hours  lisinopril 20 milliGRAM(s) Oral daily      atorvastatin 40 milliGRAM(s) Oral at bedtime  dextrose 50% Injectable 25 Gram(s) IV Push once  dextrose 50% Injectable 25 Gram(s) IV Push once  dextrose 50% Injectable 12.5 Gram(s) IV Push once  dextrose Oral Gel 15 Gram(s) Oral once PRN  finasteride 5 milliGRAM(s) Oral daily  glucagon  Injectable 1 milliGRAM(s) IntraMuscular once  insulin lispro (ADMELOG) corrective regimen sliding scale   SubCutaneous every 6 hours    dextrose 5%. 1000 milliLiter(s) IV Continuous <Continuous>  dextrose 5%. 1000 milliLiter(s) IV Continuous <Continuous>  tamsulosin 0.4 milliGRAM(s) Oral at bedtime      PAST MEDICAL/SURGICAL HISTORY  PAST MEDICAL & SURGICAL HISTORY:  Type 2 diabetes mellitus      CVA (cerebrovascular accident)      HTN (hypertension)      History of BPH      No significant past surgical history          SOCIAL HISTORY: Substance Use (street drugs): ( x ) never used  (  ) other:    FAMILY HISTORY:      PHYSICAL EXAM:  T(C): 37.2 (08-24-24 @ 14:33), Max: 37.2 (08-24-24 @ 14:33)  HR: 60 (08-24-24 @ 14:33) (60 - 81)  BP: 137/58 (08-24-24 @ 14:33) (129/67 - 141/79)  RR: 17 (08-24-24 @ 14:33) (17 - 18)  SpO2: 99% (08-24-24 @ 14:33) (99% - 100%)  Wt(kg): --  I&O's Summary        GENERAL: NAD  EYES:   PERRLA   ENMT:   Moist mucous membranes, Good dentition, No lesions  Cardiovascular: Normal S1 S2, No JVD, No murmurs, No edema  Respiratory: b/l lung sound decreased  Gastrointestinal:  Soft, Non-tender, + BS	  Extremities: no edema                              13.5   8.20  )-----------( 173      ( 24 Aug 2024 07:20 )             41.3     08-24    139  |  107  |  28<H>  ----------------------------<  154<H>  3.5   |  18<L>  |  1.13    Ca    9.5      24 Aug 2024 07:20  Phos  2.9     08-24  Mg     1.90     08-24      proBNP:   Lipid Profile:   HgA1c:   TSH:     Consultant(s) Notes Reviewed:  [x ] YES  [ ] NO    Care Discussed with Consultants/Other Providers [ x] YES  [ ] NO    Imaging Personally Reviewed independently:  [x] YES  [ ] NO    All labs, radiologic studies, vitals, orders and medications list reviewed. Patient is seen and examined at bedside. Case discussed with medical team.

## 2024-08-24 NOTE — SWALLOW BEDSIDE ASSESSMENT ADULT - CONSISTENCIES ADMINISTERED
4 oz pudding, 4 oz moderately thick juice/moderately thick/pureed 1 oz water, 1 oz mildly thick juice/thin liquid/mildly thick

## 2024-08-24 NOTE — SWALLOW BEDSIDE ASSESSMENT ADULT - ASR SWALLOW DENTITION
edentulous; ill fitting upper dentures
upper dentures fell out during SLP removed and RN placed into denture cup

## 2024-08-24 NOTE — SWALLOW BEDSIDE ASSESSMENT ADULT - ADDITIONAL RECOMMENDATIONS
Medical team advised to reconsult service if patient exhibits change in medical condition impacting oral diet tolerance. This service to follow up for diet tolerance as schedule permits.
1. Medical team advised to reconsult this service as patient becomes medically optimized. 2. This service to follow to reassess for oral diet program candidacy as schedule permits.

## 2024-08-25 LAB
ANION GAP SERPL CALC-SCNC: 10 MMOL/L — SIGNIFICANT CHANGE UP (ref 7–14)
BUN SERPL-MCNC: 27 MG/DL — HIGH (ref 7–23)
CALCIUM SERPL-MCNC: 9.3 MG/DL — SIGNIFICANT CHANGE UP (ref 8.4–10.5)
CHLORIDE SERPL-SCNC: 108 MMOL/L — HIGH (ref 98–107)
CO2 SERPL-SCNC: 21 MMOL/L — LOW (ref 22–31)
CREAT SERPL-MCNC: 1.05 MG/DL — SIGNIFICANT CHANGE UP (ref 0.5–1.3)
EGFR: 74 ML/MIN/1.73M2 — SIGNIFICANT CHANGE UP
GLUCOSE BLDC GLUCOMTR-MCNC: 132 MG/DL — HIGH (ref 70–99)
GLUCOSE BLDC GLUCOMTR-MCNC: 143 MG/DL — HIGH (ref 70–99)
GLUCOSE BLDC GLUCOMTR-MCNC: 156 MG/DL — HIGH (ref 70–99)
GLUCOSE BLDC GLUCOMTR-MCNC: 190 MG/DL — HIGH (ref 70–99)
GLUCOSE SERPL-MCNC: 145 MG/DL — HIGH (ref 70–99)
HCT VFR BLD CALC: 38.4 % — LOW (ref 39–50)
HGB BLD-MCNC: 12.5 G/DL — LOW (ref 13–17)
MAGNESIUM SERPL-MCNC: 1.8 MG/DL — SIGNIFICANT CHANGE UP (ref 1.6–2.6)
MCHC RBC-ENTMCNC: 29 PG — SIGNIFICANT CHANGE UP (ref 27–34)
MCHC RBC-ENTMCNC: 32.6 GM/DL — SIGNIFICANT CHANGE UP (ref 32–36)
MCV RBC AUTO: 89.1 FL — SIGNIFICANT CHANGE UP (ref 80–100)
NRBC # BLD: 0 /100 WBCS — SIGNIFICANT CHANGE UP (ref 0–0)
NRBC # FLD: 0 K/UL — SIGNIFICANT CHANGE UP (ref 0–0)
PHOSPHATE SERPL-MCNC: 2.6 MG/DL — SIGNIFICANT CHANGE UP (ref 2.5–4.5)
PLATELET # BLD AUTO: 130 K/UL — LOW (ref 150–400)
POTASSIUM SERPL-MCNC: 4 MMOL/L — SIGNIFICANT CHANGE UP (ref 3.5–5.3)
POTASSIUM SERPL-SCNC: 4 MMOL/L — SIGNIFICANT CHANGE UP (ref 3.5–5.3)
RBC # BLD: 4.31 M/UL — SIGNIFICANT CHANGE UP (ref 4.2–5.8)
RBC # FLD: 13.2 % — SIGNIFICANT CHANGE UP (ref 10.3–14.5)
SODIUM SERPL-SCNC: 139 MMOL/L — SIGNIFICANT CHANGE UP (ref 135–145)
WBC # BLD: 7.13 K/UL — SIGNIFICANT CHANGE UP (ref 3.8–10.5)
WBC # FLD AUTO: 7.13 K/UL — SIGNIFICANT CHANGE UP (ref 3.8–10.5)

## 2024-08-25 RX ADMIN — FINASTERIDE 5 MILLIGRAM(S): 1 TABLET, FILM COATED ORAL at 10:39

## 2024-08-25 RX ADMIN — ENOXAPARIN SODIUM 60 MILLIGRAM(S): 100 INJECTION SUBCUTANEOUS at 17:13

## 2024-08-25 RX ADMIN — CARVEDILOL 25 MILLIGRAM(S): 6.25 TABLET ORAL at 05:56

## 2024-08-25 RX ADMIN — TAMSULOSIN HYDROCHLORIDE 0.4 MILLIGRAM(S): 0.4 CAPSULE ORAL at 22:08

## 2024-08-25 RX ADMIN — LISINOPRIL 20 MILLIGRAM(S): 10 TABLET ORAL at 05:57

## 2024-08-25 RX ADMIN — CARVEDILOL 25 MILLIGRAM(S): 6.25 TABLET ORAL at 17:13

## 2024-08-25 RX ADMIN — ENOXAPARIN SODIUM 60 MILLIGRAM(S): 100 INJECTION SUBCUTANEOUS at 05:57

## 2024-08-25 RX ADMIN — Medication 81 MILLIGRAM(S): at 10:39

## 2024-08-25 RX ADMIN — Medication 40 MILLIGRAM(S): at 22:08

## 2024-08-25 RX ADMIN — Medication 1: at 17:49

## 2024-08-25 NOTE — PROGRESS NOTE ADULT - ASSESSMENT
EKG Afib with RVR @, PVCs  Echo < from: TTE W or WO Ultrasound Enhancing Agent (08.20.24 @ 13:43) >   1. Left ventricular cavity is normal in size. Left ventricular wall thickness is normal. Left ventricular systolic function is mildly decreased with an ejection fraction visually estimated at 50 %. There are no regional wall motion abnormalities seen.   2. There is hypokinesis of the inferolateral wall.   3. Normal left ventricular diastolic function, with normal left ventricular filling pressure.   4. Normal right ventricular cavity size, with normal wall thickness, and normal right ventricular systolic function.   5. Normal left and right atrial size.   6. No pericardial effusion seen.   7. The inferior vena cava is normal in size (normal <2.1cm) with normal inspiratory collapse (normal >50%) consistent with normal right atrial pressure (~3, range 0-5mmHg).   8. Estimated pulmonary artery systolic pressure is 30 mmHg.        A/P    73 y/o Male w/Hx of HTN, BPH. DM2, incontinence on Beta agonist,  HLD, CVA (apx [id number]) currently walker dependent with old facial droop, on carvedilol (TTE Aug 2022 tech diff study, therefore  unable to evaluate LV systo function  but mild diastolic dysfunction -Stage I noted). Pt sent in from cardiologist's office for abnormal EKG. EKG at cardiologist (Dr.Saed Graham) showing afib w/RVR    1. Afib with RVR  - currently SR, rate controlled, occasional PVCs to tele  - Chest CT negative for PE shows RLL ground glass opacities corrolate clinically   - CHADSVAsc score at least 5 has h/o stroke, on Lovenox subq, can bridge to Eliquis 5mg bid  - 2d echo shows EF 50 % inferior wall hypokinesis, not a candidate for cath  -  cont asa, lipitor 40, coreg 25 bid    2. hx of CVA  -  ac, asa, lipitor 40 resumed    3. HTN   - lisinopril 20mg, coreg 25 bid, BP better    4. DVT ppx- currently on lovenox Bcc  Nodular Histology Text: There were aggregates of basaloid cells.

## 2024-08-25 NOTE — DIETITIAN INITIAL EVALUATION ADULT - PERTINENT LABORATORY DATA
08-25    139  |  108<H>  |  27<H>  ----------------------------<  145<H>  4.0   |  21<L>  |  1.05    Ca    9.3      25 Aug 2024 07:00  Phos  2.6     08-25  Mg     1.80     08-25    POCT Blood Glucose.: 143 mg/dL (08-25-24 @ 12:33)  A1C with Estimated Average Glucose Result: 6.1 % (08-19-24 @ 07:26)  A1C with Estimated Average Glucose Result: 7.1 % (09-21-23 @ 06:40)

## 2024-08-25 NOTE — PROGRESS NOTE ADULT - SUBJECTIVE AND OBJECTIVE BOX
Severo Hernandez MD  Interventional Cardiology / Endovascular Specialist  [place] Office : [address] [place] [address] [place]. [address]  Tel:   [place] Office : [address] [facility] [place] [address]  Tel: [phone]  Cell : [phone]     Pt is lying in bed comfortable not in distress, no chest pains no SOB no palpitations  	  MEDICATIONS:  aspirin enteric coated 81 milliGRAM(s) Oral daily  carvedilol 25 milliGRAM(s) Oral every 12 hours  enoxaparin Injectable 60 milliGRAM(s) SubCutaneous every 12 hours  lisinopril 20 milliGRAM(s) Oral daily            atorvastatin 40 milliGRAM(s) Oral at bedtime  dextrose 50% Injectable 12.5 Gram(s) IV Push once  dextrose 50% Injectable 25 Gram(s) IV Push once  dextrose 50% Injectable 25 Gram(s) IV Push once  dextrose Oral Gel 15 Gram(s) Oral once PRN  finasteride 5 milliGRAM(s) Oral daily  glucagon  Injectable 1 milliGRAM(s) IntraMuscular once  insulin lispro (ADMELOG) corrective regimen sliding scale   SubCutaneous three times a day before meals  insulin lispro (ADMELOG) corrective regimen sliding scale   SubCutaneous at bedtime    dextrose 5%. 1000 milliLiter(s) IV Continuous <Continuous>  dextrose 5%. 1000 milliLiter(s) IV Continuous <Continuous>  tamsulosin 0.4 milliGRAM(s) Oral at bedtime      PAST MEDICAL/SURGICAL HISTORY  PAST MEDICAL & SURGICAL HISTORY:  Type 2 diabetes mellitus      CVA (cerebrovascular accident)      HTN (hypertension)      History of BPH      No significant past surgical history          SOCIAL HISTORY: Substance Use (street drugs): ( x ) never used  (  ) other:    FAMILY HISTORY:      PHYSICAL EXAM:  T(C): 36.3 (08-25-24 @ 10:53), Max: 37.2 (08-24-24 @ 14:33)  HR: 63 (08-25-24 @ 10:53) (60 - 74)  BP: 138/67 (08-25-24 @ 10:53) (113/62 - 138/67)  RR: 18 (08-25-24 @ 10:53) (17 - 18)  SpO2: 98% (08-25-24 @ 10:53) (97% - 100%)  Wt(kg): --  I&O's Summary    25 Aug 2024 07:01  -  25 Aug 2024 12:48  --------------------------------------------------------  IN: 0 mL / OUT: 300 mL / NET: -300 mL          GENERAL: NAD  EYES:   PERRLA   ENMT:   Moist mucous membranes, Good dentition, No lesions  Cardiovascular: Normal S1 S2, No JVD, No murmurs, No edema  Respiratory: Lungs clear to auscultation	  Gastrointestinal:  Soft, Non-tender, + BS	  Extremities: no edema                                    12.5   7.13  )-----------( 130      ( 25 Aug 2024 07:00 )             38.4     08-25    139  |  108<H>  |  27<H>  ----------------------------<  145<H>  4.0   |  21<L>  |  1.05    Ca    9.3      25 Aug 2024 07:00  Phos  2.6     08-25  Mg     1.80     08-25      proBNP:   Lipid Profile:   HgA1c:   TSH:     Consultant(s) Notes Reviewed:  [x ] YES  [ ] NO    Care Discussed with Consultants/Other Providers [ x] YES  [ ] NO    Imaging Personally Reviewed independently:  [x] YES  [ ] NO    All labs, radiologic studies, vitals, orders and medications list reviewed. Patient is seen and examined at bedside. Case discussed with medical team.

## 2024-08-25 NOTE — PROGRESS NOTE ADULT - SUBJECTIVE AND OBJECTIVE BOX
SUBJECTIVE / OVERNIGHT EVENTS:pt seen and examined  pt eating dysphagia diet  08-25-24     MEDICATIONS  (STANDING):  aspirin enteric coated 81 milliGRAM(s) Oral daily  atorvastatin 40 milliGRAM(s) Oral at bedtime  carvedilol 25 milliGRAM(s) Oral every 12 hours  dextrose 5%. 1000 milliLiter(s) (50 mL/Hr) IV Continuous <Continuous>  dextrose 5%. 1000 milliLiter(s) (100 mL/Hr) IV Continuous <Continuous>  dextrose 50% Injectable 12.5 Gram(s) IV Push once  dextrose 50% Injectable 25 Gram(s) IV Push once  dextrose 50% Injectable 25 Gram(s) IV Push once  enoxaparin Injectable 60 milliGRAM(s) SubCutaneous every 12 hours  finasteride 5 milliGRAM(s) Oral daily  glucagon  Injectable 1 milliGRAM(s) IntraMuscular once  insulin lispro (ADMELOG) corrective regimen sliding scale   SubCutaneous three times a day before meals  insulin lispro (ADMELOG) corrective regimen sliding scale   SubCutaneous at bedtime  lisinopril 20 milliGRAM(s) Oral daily  tamsulosin 0.4 milliGRAM(s) Oral at bedtime    MEDICATIONS  (PRN):  dextrose Oral Gel 15 Gram(s) Oral once PRN Blood Glucose LESS THAN 70 milliGRAM(s)/deciliter    Vital Signs Last 24 Hrs  T(C): 36.3 (08-25-24 @ 10:53), Max: 36.3 (08-25-24 @ 05:42)  T(F): 97.4 (08-25-24 @ 10:53), Max: 97.4 (08-25-24 @ 10:53)  HR: 74 (08-25-24 @ 17:10) (63 - 74)  BP: 170/81 (08-25-24 @ 17:10) (114/63 - 170/81)  BP(mean): --  RR: 18 (08-25-24 @ 10:53) (18 - 18)  SpO2: 98% (08-25-24 @ 10:53) (97% - 98%)          PHYSICAL EXAM:  GENERAL: NAD  EYES: EOMI, PERRLA  NECK: Supple, No JVD  CHEST/LUNG: dec breath sounds at bases  HEART:  S1 , S2 +  ABDOMEN: soft, bs+  EXTREMITIES:  no edema  NEUROLOGY:alert awake CONFUSED    LABS:  08-25    139  |  108<H>  |  27<H>  ----------------------------<  145<H>  4.0   |  21<L>  |  1.05    Ca    9.3      25 Aug 2024 07:00  Phos  2.6     08-25  Mg     1.80     08-25      Creatinine Trend: 1.05 <--, 1.13 <--, 1.09 <--, 0.85 <--, 0.94 <--, 0.97 <--, 1.00 <--                        12.5   7.13  )-----------( 130      ( 25 Aug 2024 07:00 )             38.4     Urine Studies:  Urinalysis Basic - ( 25 Aug 2024 07:00 )    Color:  / Appearance:  / SG:  / pH:   Gluc: 145 mg/dL / Ketone:   / Bili:  / Urobili:    Blood:  / Protein:  / Nitrite:    Leuk Esterase:  / RBC:  / WBC    Sq Epi:  / Non Sq Epi:  / Bacteria:                                                                   RADIOLOGY & ADDITIONAL TESTS:    Imaging Personally Reviewed:    Consultant(s) Notes Reviewed:      Care Discussed with Consultants/Other Providers:

## 2024-08-25 NOTE — DIETITIAN INITIAL EVALUATION ADULT - OTHER INFO
74M PMH HTN, BPh, DM, HLD, CVA admitted for new onset a-fib. Per chart review, son concern with aspiration risk.     Patient met bedside. Attempted to utilize  however, patient did not wish to engage in conversation. Interview deferred to family via phone. Spoke with pt's son on [phone]. Endorsed patient with fair-good appetite at home. Denied food allergies/diet restrictions and nausea/vomitting/diarrhea/constipation episodes at home. Explored pt's weight trend. Son is unaware of pt's UBW. Believes patient has been relatively weight stable, however, endorsed he is not sure. No growth hx to review per HIE. Reviewed current weight os 58.2 kg. Son believes this is accurate. Provided update to son on pt's poor energy intake while at hospital. Son is amenable to trial supplement.

## 2024-08-25 NOTE — DIETITIAN INITIAL EVALUATION ADULT - PERSON TAUGHT/METHOD
Education provided on adequate oral intake. Advised to encourage supplement intake to meet nutritional needs. Explored patient food preferences to implement as able during hospitalization. Son unable to provide patient food preferences but is amenable to supplement usage./verbal instruction/individual instruction/son instructed

## 2024-08-25 NOTE — DIETITIAN INITIAL EVALUATION ADULT - NUTRITIONGOAL OUTCOME1
Adequate oral intake(>75% at most meals, 100% oral nutrition supplement)   Maintain weight (+-2%) / Gradual wt gain (+2%)  Minimize signs and symptoms of malnutrition

## 2024-08-25 NOTE — DIETITIAN INITIAL EVALUATION ADULT - PERTINENT MEDS FT
MEDICATIONS  (STANDING):  aspirin enteric coated 81 milliGRAM(s) Oral daily  atorvastatin 40 milliGRAM(s) Oral at bedtime  carvedilol 25 milliGRAM(s) Oral every 12 hours  dextrose 5%. 1000 milliLiter(s) (100 mL/Hr) IV Continuous <Continuous>  dextrose 5%. 1000 milliLiter(s) (50 mL/Hr) IV Continuous <Continuous>  dextrose 50% Injectable 25 Gram(s) IV Push once  dextrose 50% Injectable 12.5 Gram(s) IV Push once  dextrose 50% Injectable 25 Gram(s) IV Push once  enoxaparin Injectable 60 milliGRAM(s) SubCutaneous every 12 hours  finasteride 5 milliGRAM(s) Oral daily  glucagon  Injectable 1 milliGRAM(s) IntraMuscular once  insulin lispro (ADMELOG) corrective regimen sliding scale   SubCutaneous three times a day before meals  insulin lispro (ADMELOG) corrective regimen sliding scale   SubCutaneous at bedtime  lisinopril 20 milliGRAM(s) Oral daily  tamsulosin 0.4 milliGRAM(s) Oral at bedtime    MEDICATIONS  (PRN):  dextrose Oral Gel 15 Gram(s) Oral once PRN Blood Glucose LESS THAN 70 milliGRAM(s)/deciliter

## 2024-08-26 LAB
ANION GAP SERPL CALC-SCNC: 13 MMOL/L — SIGNIFICANT CHANGE UP (ref 7–14)
BUN SERPL-MCNC: 20 MG/DL — SIGNIFICANT CHANGE UP (ref 7–23)
CALCIUM SERPL-MCNC: 10.1 MG/DL — SIGNIFICANT CHANGE UP (ref 8.4–10.5)
CHLORIDE SERPL-SCNC: 102 MMOL/L — SIGNIFICANT CHANGE UP (ref 98–107)
CO2 SERPL-SCNC: 21 MMOL/L — LOW (ref 22–31)
CREAT SERPL-MCNC: 0.8 MG/DL — SIGNIFICANT CHANGE UP (ref 0.5–1.3)
EGFR: 93 ML/MIN/1.73M2 — SIGNIFICANT CHANGE UP
GLUCOSE BLDC GLUCOMTR-MCNC: 160 MG/DL — HIGH (ref 70–99)
GLUCOSE BLDC GLUCOMTR-MCNC: 165 MG/DL — HIGH (ref 70–99)
GLUCOSE BLDC GLUCOMTR-MCNC: 169 MG/DL — HIGH (ref 70–99)
GLUCOSE BLDC GLUCOMTR-MCNC: 184 MG/DL — HIGH (ref 70–99)
GLUCOSE SERPL-MCNC: 149 MG/DL — HIGH (ref 70–99)
HCT VFR BLD CALC: 42.1 % — SIGNIFICANT CHANGE UP (ref 39–50)
HGB BLD-MCNC: 13.8 G/DL — SIGNIFICANT CHANGE UP (ref 13–17)
MAGNESIUM SERPL-MCNC: 1.8 MG/DL — SIGNIFICANT CHANGE UP (ref 1.6–2.6)
MCHC RBC-ENTMCNC: 28.8 PG — SIGNIFICANT CHANGE UP (ref 27–34)
MCHC RBC-ENTMCNC: 32.8 GM/DL — SIGNIFICANT CHANGE UP (ref 32–36)
MCV RBC AUTO: 87.7 FL — SIGNIFICANT CHANGE UP (ref 80–100)
NRBC # BLD: 0 /100 WBCS — SIGNIFICANT CHANGE UP (ref 0–0)
NRBC # FLD: 0 K/UL — SIGNIFICANT CHANGE UP (ref 0–0)
PHOSPHATE SERPL-MCNC: 3 MG/DL — SIGNIFICANT CHANGE UP (ref 2.5–4.5)
PLATELET # BLD AUTO: 196 K/UL — SIGNIFICANT CHANGE UP (ref 150–400)
POTASSIUM SERPL-MCNC: 5.2 MMOL/L — SIGNIFICANT CHANGE UP (ref 3.5–5.3)
POTASSIUM SERPL-SCNC: 5.2 MMOL/L — SIGNIFICANT CHANGE UP (ref 3.5–5.3)
RBC # BLD: 4.8 M/UL — SIGNIFICANT CHANGE UP (ref 4.2–5.8)
RBC # FLD: 13 % — SIGNIFICANT CHANGE UP (ref 10.3–14.5)
SODIUM SERPL-SCNC: 136 MMOL/L — SIGNIFICANT CHANGE UP (ref 135–145)
WBC # BLD: 8.26 K/UL — SIGNIFICANT CHANGE UP (ref 3.8–10.5)
WBC # FLD AUTO: 8.26 K/UL — SIGNIFICANT CHANGE UP (ref 3.8–10.5)

## 2024-08-26 RX ORDER — APIXABAN 5 MG/1
5 TABLET, FILM COATED ORAL EVERY 12 HOURS
Refills: 0 | Status: DISCONTINUED | OUTPATIENT
Start: 2024-08-26 | End: 2024-08-28

## 2024-08-26 RX ADMIN — TAMSULOSIN HYDROCHLORIDE 0.4 MILLIGRAM(S): 0.4 CAPSULE ORAL at 22:07

## 2024-08-26 RX ADMIN — Medication 1: at 18:11

## 2024-08-26 RX ADMIN — Medication 40 MILLIGRAM(S): at 22:07

## 2024-08-26 RX ADMIN — Medication 1: at 09:02

## 2024-08-26 RX ADMIN — APIXABAN 5 MILLIGRAM(S): 5 TABLET, FILM COATED ORAL at 18:10

## 2024-08-26 RX ADMIN — ENOXAPARIN SODIUM 60 MILLIGRAM(S): 100 INJECTION SUBCUTANEOUS at 06:36

## 2024-08-26 RX ADMIN — CARVEDILOL 25 MILLIGRAM(S): 6.25 TABLET ORAL at 18:10

## 2024-08-26 RX ADMIN — FINASTERIDE 5 MILLIGRAM(S): 1 TABLET, FILM COATED ORAL at 09:01

## 2024-08-26 RX ADMIN — LISINOPRIL 20 MILLIGRAM(S): 10 TABLET ORAL at 06:36

## 2024-08-26 RX ADMIN — CARVEDILOL 25 MILLIGRAM(S): 6.25 TABLET ORAL at 06:37

## 2024-08-26 RX ADMIN — Medication 81 MILLIGRAM(S): at 09:01

## 2024-08-26 RX ADMIN — Medication 1: at 12:50

## 2024-08-26 NOTE — PROGRESS NOTE ADULT - ASSESSMENT
EKG Afib with RVR @, PVCs  Echo < from: TTE W or WO Ultrasound Enhancing Agent (08.20.24 @ 13:43) >   1. Left ventricular cavity is normal in size. Left ventricular wall thickness is normal. Left ventricular systolic function is mildly decreased with an ejection fraction visually estimated at 50 %. There are no regional wall motion abnormalities seen.   2. There is hypokinesis of the inferolateral wall.   3. Normal left ventricular diastolic function, with normal left ventricular filling pressure.   4. Normal right ventricular cavity size, with normal wall thickness, and normal right ventricular systolic function.   5. Normal left and right atrial size.   6. No pericardial effusion seen.   7. The inferior vena cava is normal in size (normal <2.1cm) with normal inspiratory collapse (normal >50%) consistent with normal right atrial pressure (~3, range 0-5mmHg).   8. Estimated pulmonary artery systolic pressure is 30 mmHg.        A/P    75 y/o Male w/Hx of HTN, BPH. DM2, incontinence on Beta agonist,  HLD, CVA (apx [id number]) currently walker dependent with old facial droop, on carvedilol (TTE Aug 2022 tech diff study, therefore  unable to evaluate LV systo function  but mild diastolic dysfunction -Stage I noted). Pt sent in from cardiologist's office for abnormal EKG. EKG at cardiologist (Dr.Saed Graham) showing afib w/RVR    1. Afib with RVR  - currently SR, rate controlled, occasional PVCs to tele  - Chest CT negative for PE shows RLL ground glass opacities corrolate clinically   - CHADSVAsc score at least 5 has h/o stroke, on Lovenox subq, can bridge to Eliquis 5mg bid  - 2d echo shows EF 50 % inferior wall hypokinesis, not a candidate for cath  -  cont asa, lipitor 40, coreg 25 bid    2. hx of CVA  -  ac, asa, lipitor 40 resumed    3. HTN   - lisinopril 20mg, coreg 25 bid, BP better    4. DVT ppx- currently on lovenox

## 2024-08-26 NOTE — PROGRESS NOTE ADULT - SUBJECTIVE AND OBJECTIVE BOX
SUBJECTIVE / OVERNIGHT EVENTS:pt seen and examined  pt eating dysphagia diet  08-26-24     MEDICATIONS  (STANDING):  apixaban 5 milliGRAM(s) Oral every 12 hours  aspirin enteric coated 81 milliGRAM(s) Oral daily  atorvastatin 40 milliGRAM(s) Oral at bedtime  carvedilol 25 milliGRAM(s) Oral every 12 hours  dextrose 5%. 1000 milliLiter(s) (50 mL/Hr) IV Continuous <Continuous>  dextrose 5%. 1000 milliLiter(s) (100 mL/Hr) IV Continuous <Continuous>  dextrose 50% Injectable 12.5 Gram(s) IV Push once  dextrose 50% Injectable 25 Gram(s) IV Push once  dextrose 50% Injectable 25 Gram(s) IV Push once  finasteride 5 milliGRAM(s) Oral daily  glucagon  Injectable 1 milliGRAM(s) IntraMuscular once  insulin lispro (ADMELOG) corrective regimen sliding scale   SubCutaneous at bedtime  insulin lispro (ADMELOG) corrective regimen sliding scale   SubCutaneous three times a day before meals  lisinopril 20 milliGRAM(s) Oral daily  tamsulosin 0.4 milliGRAM(s) Oral at bedtime    MEDICATIONS  (PRN):  dextrose Oral Gel 15 Gram(s) Oral once PRN Blood Glucose LESS THAN 70 milliGRAM(s)/deciliter    Vital Signs Last 24 Hrs  T(C): 37.2 (08-26-24 @ 11:19), Max: 37.2 (08-26-24 @ 11:19)  T(F): 99 (08-26-24 @ 11:19), Max: 99 (08-26-24 @ 11:19)  HR: 92 (08-26-24 @ 18:43) (75 - 92)  BP: 142/89 (08-26-24 @ 18:43) (119/69 - 173/98)  BP(mean): 83 (08-26-24 @ 11:19) (83 - 83)  RR: 18 (08-26-24 @ 11:19) (18 - 18)  SpO2: 100% (08-26-24 @ 11:19) (97% - 100%)          PHYSICAL EXAM:  GENERAL: NAD  EYES: EOMI, PERRLA  NECK: Supple, No JVD  CHEST/LUNG: dec breath sounds at bases  HEART:  S1 , S2 +  ABDOMEN: soft, bs+  EXTREMITIES:  no edema  NEUROLOGY:alert awake CONFUSED    LABS:  08-26    136  |  102  |  20  ----------------------------<  149<H>  5.2   |  21<L>  |  0.80    Ca    10.1      26 Aug 2024 05:14  Phos  3.0     08-26  Mg     1.80     08-26      Creatinine Trend: 0.80 <--, 1.05 <--, 1.13 <--, 1.09 <--, 0.85 <--, 0.94 <--, 0.97 <--                        13.8   8.26  )-----------( 196      ( 26 Aug 2024 05:14 )             42.1     Urine Studies:  Urinalysis Basic - ( 26 Aug 2024 05:14 )    Color:  / Appearance:  / SG:  / pH:   Gluc: 149 mg/dL / Ketone:   / Bili:  / Urobili:    Blood:  / Protein:  / Nitrite:    Leuk Esterase:  / RBC:  / WBC    Sq Epi:  / Non Sq Epi:  / Bacteria:                                                                         RADIOLOGY & ADDITIONAL TESTS:    Imaging Personally Reviewed:    Consultant(s) Notes Reviewed:      Care Discussed with Consultants/Other Providers:

## 2024-08-26 NOTE — PROGRESS NOTE ADULT - PROBLEM SELECTOR PLAN 4
on 75 gemtesa; pharmacy does not carry but carries Myrbetriq. will hold for now; if decision made to place on Myrbetriq and  afib difficult to rate control, consider holding
on 75 gemtesa;
on 75 gemtesa;
on 75 gemtesa; pharmacy does not carry but carries Myrbetriq. will hold for now; if decision made to place on Myrbetriq and  afib difficult to rate control, consider holding
on 75 gemtesa;
on 75 gemtesa; pharmacy does not carry but carries Myrbetriq. will hold for now; if decision made to place on Myrbetriq and  afib difficult to rate control, consider holding

## 2024-08-26 NOTE — PROGRESS NOTE ADULT - PROBLEM SELECTOR PROBLEM 5
Encounter for deep vein thrombosis (DVT) prophylaxis

## 2024-08-26 NOTE — PROGRESS NOTE ADULT - SUBJECTIVE AND OBJECTIVE BOX
Severo Hernandez MD  Interventional Cardiology / Advance Heart Failure and Cardiac Transplant Specialist  [place] Office : [address] [place] [address] [facility] [address]  Tel: [phone]  [place] Office : [address] [facility] [place] [address]  Tel: [phone]       Pt is lying in bed comfortable not in distress, no chest pains no SOB no palpitations  	  MEDICATIONS:  apixaban 5 milliGRAM(s) Oral every 12 hours  aspirin enteric coated 81 milliGRAM(s) Oral daily  carvedilol 25 milliGRAM(s) Oral every 12 hours  lisinopril 20 milliGRAM(s) Oral daily            atorvastatin 40 milliGRAM(s) Oral at bedtime  dextrose 50% Injectable 25 Gram(s) IV Push once  dextrose 50% Injectable 12.5 Gram(s) IV Push once  dextrose 50% Injectable 25 Gram(s) IV Push once  dextrose Oral Gel 15 Gram(s) Oral once PRN  finasteride 5 milliGRAM(s) Oral daily  glucagon  Injectable 1 milliGRAM(s) IntraMuscular once  insulin lispro (ADMELOG) corrective regimen sliding scale   SubCutaneous three times a day before meals  insulin lispro (ADMELOG) corrective regimen sliding scale   SubCutaneous at bedtime    dextrose 5%. 1000 milliLiter(s) IV Continuous <Continuous>  dextrose 5%. 1000 milliLiter(s) IV Continuous <Continuous>  tamsulosin 0.4 milliGRAM(s) Oral at bedtime      PAST MEDICAL/SURGICAL HISTORY  PAST MEDICAL & SURGICAL HISTORY:  Type 2 diabetes mellitus      CVA (cerebrovascular accident)      HTN (hypertension)      History of BPH      No significant past surgical history          SOCIAL HISTORY: Substance Use (street drugs): ( x ) never used  (  ) other:    FAMILY HISTORY:          PHYSICAL EXAM:  T(C): 37.2 (08-26-24 @ 11:19), Max: 37.2 (08-26-24 @ 11:19)  HR: 75 (08-26-24 @ 11:19) (74 - 78)  BP: 119/69 (08-26-24 @ 11:19) (119/69 - 170/81)  RR: 18 (08-26-24 @ 11:19) (18 - 18)  SpO2: 100% (08-26-24 @ 11:19) (97% - 100%)  Wt(kg): --  I&O's Summary    25 Aug 2024 07:01  -  26 Aug 2024 07:00  --------------------------------------------------------  IN: 0 mL / OUT: 300 mL / NET: -300 mL          GENERAL: NAD  EYES:   PERRLA   ENMT:   Moist mucous membranes, Good dentition, No lesions  Cardiovascular: Normal S1 S2, No JVD, No murmurs, No edema  Respiratory: Lungs clear to auscultation	  Gastrointestinal:  Soft, Non-tender, + BS	  Extremities: no edema                                    13.8   8.26  )-----------( 196      ( 26 Aug 2024 05:14 )             42.1     08-26    136  |  102  |  20  ----------------------------<  149<H>  5.2   |  21<L>  |  0.80    Ca    10.1      26 Aug 2024 05:14  Phos  3.0     08-26  Mg     1.80     08-26      proBNP:   Lipid Profile:   HgA1c:   TSH:     Consultant(s) Notes Reviewed:  [x ] YES  [ ] NO    Care Discussed with Consultants/Other Providers [ x] YES  [ ] NO    Imaging Personally Reviewed independently:  [x] YES  [ ] NO    All labs, radiologic studies, vitals, orders and medications list reviewed. Patient is seen and examined at bedside. Case discussed with medical team.

## 2024-08-26 NOTE — PROGRESS NOTE ADULT - PROBLEM SELECTOR PLAN 3
c/w tamsulosin

## 2024-08-26 NOTE — PROGRESS NOTE ADULT - PROBLEM SELECTOR PLAN 6
discussed pts current clinical status , management plan in detail in length with  pts family  discussed management plan with acp covering pt  pt passed speech and swallow - on diet as recommended by s/ s kameron  strict aspiration precautions  informed pts son
discussed pts current clinical status , management plan in detail in length with  pts family  discussed management plan with acp covering pt  pt passed speech and swallow - on diet as recommended by s/ s kameron  strict aspiration precautions  informed pts son
discussed pts current clinical status , management plan in detail in length with  pts family  discussed management plan with acp covering pt    pt failed speech and swallow- as per pts son,pt been on dysphagia diet at home, coughing during swallowing   explained in deatil about pts risk of aspiration with diet  pts son requesting repeat speech and swallow eval- ORDERED
discussed pts current clinical status , management plan in detail in length with  pts family  discussed management plan with acp covering pt

## 2024-08-26 NOTE — PROGRESS NOTE ADULT - PROBLEM SELECTOR PLAN 1
-CTA chest neg for PE, but right sided opacities some of which are GGO; mini rvp neg, pt sat 99 on RA, afebrile, normal wbc; no abx for now  -  -will c/w home coreg 25 bid, titrate as needed  cards f/u
-CTA chest neg for PE, but right sided opacities some of which are GGO; mini rvp neg, pt sat 99 on RA, afebrile, normal wbc; no abx for now  -  -will c/w home coreg 25 bid, titrate as needed  -tele, tte, tsh  -given elevated chadsvasc, ac- discussed benefits nad risks of ac
-CTA chest neg for PE, but right sided opacities some of which are GGO; mini rvp neg, pt sat 99 on RA, afebrile, normal wbc; no abx for now  -  -will c/w home coreg 25 bid, titrate as needed  cards f/u
-CTA chest neg for PE, but right sided opacities some of which are GGO; mini rvp neg, pt sat 99 on RA, afebrile, normal wbc; no abx for now  -  -will c/w home coreg 25 bid, titrate as needed  cards f/u

## 2024-08-26 NOTE — PROGRESS NOTE ADULT - PROBLEM SELECTOR PLAN 2
iss
hold orals, place on ISS    carb diet; son concerned over asp risk; noted some coughing recently with po intake; passed bedside dysphagia screen, and per son request , placed on puree and thick liq. will additionally order formal swallow eval.  right sided opacities noted on CTA some of which are GGO; mini rvp neg, pt sat 99 on RA, afebrile, normal wbc; no abx for now
iss
hold orals, place on ISS    carb diet; son concerned over asp risk; noted some coughing recently with po intake; passed bedside dysphagia screen, and per son request , placed on puree and thick liq. will additionally order formal swallow eval.  right sided opacities noted on CTA some of which are GGO; mini rvp neg, pt sat 99 on RA, afebrile, normal wbc; no abx for now

## 2024-08-26 NOTE — PROGRESS NOTE ADULT - PROBLEM SELECTOR PROBLEM 4
Urine incontinence

## 2024-08-27 LAB
BLOOD GAS VENOUS COMPREHENSIVE RESULT: SIGNIFICANT CHANGE UP
GLUCOSE BLDC GLUCOMTR-MCNC: 146 MG/DL — HIGH (ref 70–99)
GLUCOSE BLDC GLUCOMTR-MCNC: 171 MG/DL — HIGH (ref 70–99)
GLUCOSE BLDC GLUCOMTR-MCNC: 200 MG/DL — HIGH (ref 70–99)
GLUCOSE BLDC GLUCOMTR-MCNC: 214 MG/DL — HIGH (ref 70–99)

## 2024-08-27 PROCEDURE — 71045 X-RAY EXAM CHEST 1 VIEW: CPT | Mod: 26

## 2024-08-27 RX ADMIN — Medication 1: at 12:38

## 2024-08-27 RX ADMIN — APIXABAN 5 MILLIGRAM(S): 5 TABLET, FILM COATED ORAL at 18:05

## 2024-08-27 RX ADMIN — APIXABAN 5 MILLIGRAM(S): 5 TABLET, FILM COATED ORAL at 05:28

## 2024-08-27 RX ADMIN — Medication 81 MILLIGRAM(S): at 13:06

## 2024-08-27 RX ADMIN — TAMSULOSIN HYDROCHLORIDE 0.4 MILLIGRAM(S): 0.4 CAPSULE ORAL at 21:10

## 2024-08-27 RX ADMIN — Medication 40 MILLIGRAM(S): at 21:10

## 2024-08-27 RX ADMIN — FINASTERIDE 5 MILLIGRAM(S): 1 TABLET, FILM COATED ORAL at 13:06

## 2024-08-27 RX ADMIN — CARVEDILOL 25 MILLIGRAM(S): 6.25 TABLET ORAL at 05:29

## 2024-08-27 RX ADMIN — CARVEDILOL 25 MILLIGRAM(S): 6.25 TABLET ORAL at 18:06

## 2024-08-27 RX ADMIN — LISINOPRIL 20 MILLIGRAM(S): 10 TABLET ORAL at 05:28

## 2024-08-27 NOTE — PROVIDER CONTACT NOTE (OTHER) - REASON
Patient growing combative, trying to get out of bed and pulling at wires
patient desat to 80s on room air
Patient had 3 beats of VT

## 2024-08-27 NOTE — PROVIDER CONTACT NOTE (OTHER) - BACKGROUND
Patient is a 73 y/o M admitted for a fib
patient A&ox0-2, on roomair, admitted for afib,
Admit Diagnosis) Atrial fibrillation  (PMH) Type 2 diabetes mellitus  (PMH) CVA (cerebrovascular accident)  (PMH) HTN (hypertension)

## 2024-08-27 NOTE — SWALLOW VFSS/MBS ASSESSMENT ADULT - COMMENTS
Adult Cardiology NP: "75 y/o Male w/Hx of HTN, BPH. DM2, incontinence on Beta agonist,  HLD, CVA (apx [id number]) currently walker dependent with old facial droop, on carvedilol (TTE Aug 2022 tech diff study, therefore  unable to evaluate LV systo function  but mild diastolic dysfunction -Stage I noted). Pt sent in from cardiologist's office for abnormal EKG. EKG at cardiologist (Dr.Saed Graham) showing afib w/RVR"     Of Note 1: Pt known to this service from previous admission, seen 9/24/2023 w/recommendations for puree/moderately thick liquids (see note for details).    Of Note 2: Pt known to this service from this admission, seen 8/20 & 8/24 for bedside swallow assessments (see notes for details).     Pt arrived to Radiology for Cinesophagram this morning. Pt positioned upright on stretcher in holding area; extensive verbal/gentle tactile stim provided however pt remained lethargic. SLP attempted coated teaspoon of moderately thick liquids however pt with no recognition of bolus presentation and absent stripping of bolus off utensil. Cinesophagram deferred on this date. Pt sent back to medical floor. GREYSON Rogers made aware to re-place cinesophagram order at MD discretion when patient is medically optimized.

## 2024-08-27 NOTE — PROVIDER CONTACT NOTE (OTHER) - ACTION/TREATMENT ORDERED:
Repeat labs.
acp made aware, ativan given, will continue to monitor
patient placed on 2L nc, provider ordered venous blood gas and chest xray

## 2024-08-27 NOTE — PROVIDER CONTACT NOTE (OTHER) - ASSESSMENT
Patient  primary diagnosis is A-fib. Had 3 beats of VT 2x at 520am, then 3 beats 4x at 6am. v/s stable, denies chest pain or SOB at the time. mental status at baseline.
patient vitals stable no s/s pf distress
Patient is combative and at risk of falls, patient cannot be reoriented

## 2024-08-27 NOTE — PROVIDER CONTACT NOTE (OTHER) - SITUATION
Patient growing combative, trying to get out of bed and pulling at wires
Patient had 3 beats of VT 2x at 520am, then 3 beats 4x at 6am.
patient on room air sating 80s

## 2024-08-27 NOTE — PROGRESS NOTE ADULT - ASSESSMENT
EKG Afib with RVR @, PVCs  Echo < from: TTE W or WO Ultrasound Enhancing Agent (08.20.24 @ 13:43) >   1. Left ventricular cavity is normal in size. Left ventricular wall thickness is normal. Left ventricular systolic function is mildly decreased with an ejection fraction visually estimated at 50 %. There are no regional wall motion abnormalities seen.   2. There is hypokinesis of the inferolateral wall.   3. Normal left ventricular diastolic function, with normal left ventricular filling pressure.   4. Normal right ventricular cavity size, with normal wall thickness, and normal right ventricular systolic function.   5. Normal left and right atrial size.   6. No pericardial effusion seen.   7. The inferior vena cava is normal in size (normal <2.1cm) with normal inspiratory collapse (normal >50%) consistent with normal right atrial pressure (~3, range 0-5mmHg).   8. Estimated pulmonary artery systolic pressure is 30 mmHg.        A/P    75 y/o Male w/Hx of HTN, BPH. 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 Pt sent in from cardiologist's office for abnormal EKG. EKG at cardiologist (Dr.Saed Graham) showing afib w/RVR    1. Afib with RVR  - currently SR, rate controlled, to tele  - Chest CT negative for PE shows RLL ground glass opacities corrolate clinically   - CHADSVAsc score at least 5 has h/o stroke,  bridged to Eliquis 5mg bid  - 2d echo shows EF 50 % inferior wall hypokinesis, not a candidate for cath  -  cont asa, lipitor 40, coreg 25 bid    2. hx of CVA  -  ac, asa, lipitor 40 resumed    3. HTN   - lisinopril 20mg, coreg 25 bid, BP better    4. DVT ppx- ac

## 2024-08-27 NOTE — PROGRESS NOTE ADULT - SUBJECTIVE AND OBJECTIVE BOX
Severo Hernandez MD  Interventional Cardiology / Advance Heart Failure and Cardiac Transplant Specialist  [place] Office : [address] [place] [address] [facility] [address]  Tel: [phone]  [place] Office : [address] [facility] [place] [address]  Tel: [phone]       Pt is lying in bed comfortable not in distress, no chest pains no SOB no palpitations  	  MEDICATIONS:  apixaban 5 milliGRAM(s) Oral every 12 hours  aspirin enteric coated 81 milliGRAM(s) Oral daily  carvedilol 25 milliGRAM(s) Oral every 12 hours  lisinopril 20 milliGRAM(s) Oral daily            atorvastatin 40 milliGRAM(s) Oral at bedtime  dextrose 50% Injectable 12.5 Gram(s) IV Push once  dextrose 50% Injectable 25 Gram(s) IV Push once  dextrose 50% Injectable 25 Gram(s) IV Push once  dextrose Oral Gel 15 Gram(s) Oral once PRN  finasteride 5 milliGRAM(s) Oral daily  glucagon  Injectable 1 milliGRAM(s) IntraMuscular once  insulin lispro (ADMELOG) corrective regimen sliding scale   SubCutaneous at bedtime  insulin lispro (ADMELOG) corrective regimen sliding scale   SubCutaneous three times a day before meals    dextrose 5%. 1000 milliLiter(s) IV Continuous <Continuous>  dextrose 5%. 1000 milliLiter(s) IV Continuous <Continuous>  tamsulosin 0.4 milliGRAM(s) Oral at bedtime      PAST MEDICAL/SURGICAL HISTORY  PAST MEDICAL & SURGICAL HISTORY:  Type 2 diabetes mellitus      CVA (cerebrovascular accident)      HTN (hypertension)      History of BPH      No significant past surgical history          SOCIAL HISTORY: Substance Use (street drugs): ( x ) never used  (  ) other:    FAMILY HISTORY:         PHYSICAL EXAM:  T(C): 36.8 (08-27-24 @ 11:49), Max: 36.8 (08-27-24 @ 11:49)  HR: 76 (08-27-24 @ 11:49) (76 - 92)  BP: 102/56 (08-27-24 @ 11:49) (102/56 - 173/98)  RR: 17 (08-27-24 @ 11:49) (17 - 18)  SpO2: 99% (08-27-24 @ 11:49) (99% - 100%)  Wt(kg): --  I&O's Summary        GENERAL: NAD  EYES:   PERRLA   ENMT:   Moist mucous membranes, Good dentition, No lesions  Cardiovascular: Normal S1 S2, No JVD, No murmurs, No edema  Respiratory: b/l decreased lung sound  Gastrointestinal:  Soft, Non-tender, + BS	  Extremities: no edema                                    13.8   8.26  )-----------( 196      ( 26 Aug 2024 05:14 )             42.1     08-26    136  |  102  |  20  ----------------------------<  149<H>  5.2   |  21<L>  |  0.80    Ca    10.1      26 Aug 2024 05:14  Phos  3.0     08-26  Mg     1.80     08-26      proBNP:   Lipid Profile:   HgA1c:   TSH:     Consultant(s) Notes Reviewed:  [x ] YES  [ ] NO    Care Discussed with Consultants/Other Providers [ x] YES  [ ] NO    Imaging Personally Reviewed independently:  [x] YES  [ ] NO    All labs, radiologic studies, vitals, orders and medications list reviewed. Patient is seen and examined at bedside. Case discussed with medical team.

## 2024-08-28 ENCOUNTER — TRANSCRIPTION ENCOUNTER (OUTPATIENT)
Age: 74
End: 2024-08-28

## 2024-08-28 VITALS
TEMPERATURE: 98 F | RESPIRATION RATE: 18 BRPM | OXYGEN SATURATION: 100 % | HEART RATE: 74 BPM | SYSTOLIC BLOOD PRESSURE: 120 MMHG | DIASTOLIC BLOOD PRESSURE: 78 MMHG

## 2024-08-28 LAB
ANION GAP SERPL CALC-SCNC: 13 MMOL/L — SIGNIFICANT CHANGE UP (ref 7–14)
BUN SERPL-MCNC: 32 MG/DL — HIGH (ref 7–23)
CALCIUM SERPL-MCNC: 9.5 MG/DL — SIGNIFICANT CHANGE UP (ref 8.4–10.5)
CHLORIDE SERPL-SCNC: 103 MMOL/L — SIGNIFICANT CHANGE UP (ref 98–107)
CO2 SERPL-SCNC: 20 MMOL/L — LOW (ref 22–31)
CREAT SERPL-MCNC: 1.09 MG/DL — SIGNIFICANT CHANGE UP (ref 0.5–1.3)
EGFR: 71 ML/MIN/1.73M2 — SIGNIFICANT CHANGE UP
GLUCOSE BLDC GLUCOMTR-MCNC: 138 MG/DL — HIGH (ref 70–99)
GLUCOSE BLDC GLUCOMTR-MCNC: 171 MG/DL — HIGH (ref 70–99)
GLUCOSE SERPL-MCNC: 176 MG/DL — HIGH (ref 70–99)
HCT VFR BLD CALC: 35.1 % — LOW (ref 39–50)
HGB BLD-MCNC: 11.6 G/DL — LOW (ref 13–17)
MAGNESIUM SERPL-MCNC: 1.8 MG/DL — SIGNIFICANT CHANGE UP (ref 1.6–2.6)
MCHC RBC-ENTMCNC: 29.1 PG — SIGNIFICANT CHANGE UP (ref 27–34)
MCHC RBC-ENTMCNC: 33 GM/DL — SIGNIFICANT CHANGE UP (ref 32–36)
MCV RBC AUTO: 88 FL — SIGNIFICANT CHANGE UP (ref 80–100)
NRBC # BLD: 0 /100 WBCS — SIGNIFICANT CHANGE UP (ref 0–0)
NRBC # FLD: 0 K/UL — SIGNIFICANT CHANGE UP (ref 0–0)
PHOSPHATE SERPL-MCNC: 3.5 MG/DL — SIGNIFICANT CHANGE UP (ref 2.5–4.5)
PLATELET # BLD AUTO: 159 K/UL — SIGNIFICANT CHANGE UP (ref 150–400)
POTASSIUM SERPL-MCNC: 4.3 MMOL/L — SIGNIFICANT CHANGE UP (ref 3.5–5.3)
POTASSIUM SERPL-SCNC: 4.3 MMOL/L — SIGNIFICANT CHANGE UP (ref 3.5–5.3)
RBC # BLD: 3.99 M/UL — LOW (ref 4.2–5.8)
RBC # FLD: 12.9 % — SIGNIFICANT CHANGE UP (ref 10.3–14.5)
SODIUM SERPL-SCNC: 136 MMOL/L — SIGNIFICANT CHANGE UP (ref 135–145)
WBC # BLD: 7.19 K/UL — SIGNIFICANT CHANGE UP (ref 3.8–10.5)
WBC # FLD AUTO: 7.19 K/UL — SIGNIFICANT CHANGE UP (ref 3.8–10.5)

## 2024-08-28 RX ORDER — LISINOPRIL 10 MG/1
1 TABLET ORAL
Refills: 0 | DISCHARGE

## 2024-08-28 RX ORDER — ASPIRIN 81 MG
1 TABLET, DELAYED RELEASE (ENTERIC COATED) ORAL
Refills: 0 | DISCHARGE

## 2024-08-28 RX ORDER — VIBEGRON 75 MG/1
1 TABLET, FILM COATED ORAL
Refills: 0 | DISCHARGE

## 2024-08-28 RX ORDER — LISINOPRIL 10 MG/1
1 TABLET ORAL
Qty: 0 | Refills: 0 | DISCHARGE
Start: 2024-08-28

## 2024-08-28 RX ORDER — CARVEDILOL 6.25 MG/1
1 TABLET ORAL
Refills: 0 | DISCHARGE

## 2024-08-28 RX ORDER — FINASTERIDE 1 MG/1
1 TABLET, FILM COATED ORAL
Qty: 0 | Refills: 0 | DISCHARGE
Start: 2024-08-28

## 2024-08-28 RX ORDER — CARVEDILOL 6.25 MG/1
1 TABLET ORAL
Qty: 0 | Refills: 0 | DISCHARGE
Start: 2024-08-28

## 2024-08-28 RX ORDER — ASPIRIN 81 MG
1 TABLET, DELAYED RELEASE (ENTERIC COATED) ORAL
Qty: 0 | Refills: 0 | DISCHARGE
Start: 2024-08-28

## 2024-08-28 RX ORDER — FINASTERIDE 1 MG/1
1 TABLET, FILM COATED ORAL
Refills: 0 | DISCHARGE

## 2024-08-28 RX ORDER — APIXABAN 5 MG/1
1 TABLET, FILM COATED ORAL
Qty: 0 | Refills: 0 | DISCHARGE
Start: 2024-08-28

## 2024-08-28 RX ADMIN — Medication 1: at 09:03

## 2024-08-28 RX ADMIN — APIXABAN 5 MILLIGRAM(S): 5 TABLET, FILM COATED ORAL at 05:37

## 2024-08-28 RX ADMIN — CARVEDILOL 25 MILLIGRAM(S): 6.25 TABLET ORAL at 05:37

## 2024-08-28 RX ADMIN — LISINOPRIL 20 MILLIGRAM(S): 10 TABLET ORAL at 05:37

## 2024-08-28 RX ADMIN — CARVEDILOL 25 MILLIGRAM(S): 6.25 TABLET ORAL at 15:59

## 2024-08-28 RX ADMIN — APIXABAN 5 MILLIGRAM(S): 5 TABLET, FILM COATED ORAL at 15:59

## 2024-08-28 NOTE — PROGRESS NOTE ADULT - SUBJECTIVE AND OBJECTIVE BOX
Severo Hernandez MD  Interventional Cardiology / Advance Heart Failure and Cardiac Transplant Specialist  [place] Office : [address] [place] [address] [facility] [address]  Tel: [phone]  [place] Office : [address] [facility] [place] [address]  Tel: [phone]       Pt is lying in bed comfortable not in distress, no chest pains no SOB no palpitations  	  MEDICATIONS:  apixaban 5 milliGRAM(s) Oral every 12 hours  aspirin enteric coated 81 milliGRAM(s) Oral daily  carvedilol 25 milliGRAM(s) Oral every 12 hours  lisinopril 20 milliGRAM(s) Oral daily            atorvastatin 40 milliGRAM(s) Oral at bedtime  dextrose 50% Injectable 12.5 Gram(s) IV Push once  dextrose 50% Injectable 25 Gram(s) IV Push once  dextrose 50% Injectable 25 Gram(s) IV Push once  dextrose Oral Gel 15 Gram(s) Oral once PRN  finasteride 5 milliGRAM(s) Oral daily  glucagon  Injectable 1 milliGRAM(s) IntraMuscular once  insulin lispro (ADMELOG) corrective regimen sliding scale   SubCutaneous three times a day before meals  insulin lispro (ADMELOG) corrective regimen sliding scale   SubCutaneous at bedtime    dextrose 5%. 1000 milliLiter(s) IV Continuous <Continuous>  dextrose 5%. 1000 milliLiter(s) IV Continuous <Continuous>  tamsulosin 0.4 milliGRAM(s) Oral at bedtime      PAST MEDICAL/SURGICAL HISTORY  PAST MEDICAL & SURGICAL HISTORY:  Type 2 diabetes mellitus      CVA (cerebrovascular accident)      HTN (hypertension)      History of BPH      No significant past surgical history          SOCIAL HISTORY: Substance Use (street drugs): ( x ) never used  (  ) other:    FAMILY HISTORY:      PHYSICAL EXAM:  T(C): 36.2 (08-28-24 @ 11:50), Max: 37.1 (08-27-24 @ 20:17)  HR: 64 (08-28-24 @ 11:50) (64 - 84)  BP: 107/65 (08-28-24 @ 11:50) (95/58 - 120/70)  RR: 18 (08-28-24 @ 11:50) (17 - 18)  SpO2: 100% (08-28-24 @ 11:50) (97% - 100%)  Wt(kg): --  I&O's Summary        GENERAL: NAD  EYES:   PERRLA   ENMT:   Moist mucous membranes, Good dentition, No lesions  Cardiovascular: Normal S1 S2, No JVD, No murmurs, No edema  Respiratory: b/l decreased lung sound  Gastrointestinal:  Soft, Non-tender, + BS	  Extremities: no edema                                    11.6   7.19  )-----------( 159      ( 28 Aug 2024 07:00 )             35.1     08-28    136  |  103  |  32<H>  ----------------------------<  176<H>  4.3   |  20<L>  |  1.09    Ca    9.5      28 Aug 2024 07:00  Phos  3.5     08-28  Mg     1.80     08-28      proBNP:   Lipid Profile:   HgA1c:   TSH:     Consultant(s) Notes Reviewed:  [x ] YES  [ ] NO    Care Discussed with Consultants/Other Providers [ x] YES  [ ] NO    Imaging Personally Reviewed independently:  [x] YES  [ ] NO    All labs, radiologic studies, vitals, orders and medications list reviewed. Patient is seen and examined at bedside. Case discussed with medical team.

## 2024-08-28 NOTE — PROGRESS NOTE ADULT - ASSESSMENT
EKG Afib with RVR @, PVCs  Echo < from: TTE W or WO Ultrasound Enhancing Agent (08.20.24 @ 13:43) >   1. Left ventricular cavity is normal in size. Left ventricular wall thickness is normal. Left ventricular systolic function is mildly decreased with an ejection fraction visually estimated at 50 %. There are no regional wall motion abnormalities seen.   2. There is hypokinesis of the inferolateral wall.   3. Normal left ventricular diastolic function, with normal left ventricular filling pressure.   4. Normal right ventricular cavity size, with normal wall thickness, and normal right ventricular systolic function.   5. Normal left and right atrial size.   6. No pericardial effusion seen.   7. The inferior vena cava is normal in size (normal <2.1cm) with normal inspiratory collapse (normal >50%) consistent with normal right atrial pressure (~3, range 0-5mmHg).   8. Estimated pulmonary artery systolic pressure is 30 mmHg.        A/P    75 y/o Male w/Hx of HTN, BPH. 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 Pt sent in from cardiologist's office for abnormal EKG. EKG at cardiologist (Dr.Saed Graham) showing afib w/RVR    1. Afib with RVR  - currently SR, rate controlled, to tele  - Chest CT negative for PE shows RLL ground glass opacities corrolate clinically   - CHADSVAsc score at least 5 has h/o stroke,  on Eliquis 5mg bid  - 2d echo shows EF 50 % inferior wall hypokinesis, not a candidate for cath  - cont asa, lipitor 40, coreg 25 bid    2. hx of CVA  -  ac, asa, lipitor 40     3. HTN   - lisinopril 20mg, coreg 25 bid    4. DVT ppx- ac

## 2024-08-28 NOTE — SWALLOW VFSS/MBS ASSESSMENT ADULT - COMMENTS
Progress Note- Cardiology NP 8/27: "73 y/o Male w/Hx of HTN, BPH. 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 Pt sent in from cardiologist's office for abnormal EKG. EKG at cardiologist (Dr.Saed Graham) showing afib w/RVR"    Of Note 1: Pt known to this service from previous admission, seen 9/24/2023 w/recommendations for puree/moderately thick liquids (see note for details).    Of Note 2: Pt known to this service from this admission, seen 8/20 & 8/24 for bedside swallow assessments (see notes for details).     Patient initially scheduled for Cinesophagram on 8/27 however study deferred due to lethargy. Patient rescheduled for Cinesophagram however again deferred given patient with same clinical presentation/lethargy. Medical team further advised to reconsult as patient becomes medically optimized.

## 2024-08-28 NOTE — DISCHARGE NOTE NURSING/CASE MANAGEMENT/SOCIAL WORK - NSDCCRNAME_GEN_ALL_CORE_FT
[facility] ([address], [place] [address])

## 2024-08-28 NOTE — PROGRESS NOTE ADULT - PROVIDER SPECIALTY LIST ADULT
Cardiology
Cardiology
Internal Medicine
Internal Medicine
Cardiology
Internal Medicine

## 2024-08-28 NOTE — PROVIDER CONTACT NOTE (MEDICATION) - BACKGROUND
75 y/o Male w/Hx of HTN, BPH. DM2, incontinence on Beta agonist,  HLD, CVA (apx [id number]) currently walker dependent with old facial droop a/w new onset afib

## 2024-08-28 NOTE — DISCHARGE NOTE NURSING/CASE MANAGEMENT/SOCIAL WORK - PATIENT PORTAL LINK FT
You can access the FollowMyHealth Patient Portal offered by [facility] by registering at the following website: [email or web address]. By joining [facility]’s FollowMyHealth portal, you will also be able to view your health information using other applications (apps) compatible with our system.

## 2024-08-28 NOTE — DISCHARGE NOTE NURSING/CASE MANAGEMENT/SOCIAL WORK - NSDPFAC_GEN_ALL_CORE
[facility] ([address], [place] [address])

## 2024-08-29 NOTE — CHART NOTE - NSCHARTNOTEFT_GEN_A_CORE
Educated patient's son on the phone on apixaban including the purpose and administration. Discussed adverse effects in detail and when to seek medical attention (blood in stool, urine, vomit, fall on head etc.). Informed the son to avoid all NSAIDs for his father and to consult with a healthcare professional before starting anything new. Informed the son to notify all providers his father is on this and before any planned procedures. Questions and concerns were answered and addressed. Patient's son demonstrated understanding.

## 2024-09-22 NOTE — PATIENT PROFILE ADULT - PATIENT/CAREGIVER OFFERED  INTERPRETER SERVICES
78 y/o male w/pmhx of HTN, HLD, hx of frequent nephrolithiasis with over 10 episodes in 2010, and recent passage of a stone a few days ago w/trace hematuria, cervical neck osteoarthritis previous use of opioid pain medication, presenting for 10 days of diarrhea.         #Diarrhea  - CT abd negative for enterocolitis   - GI PCR panel negative, C. 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 Multiple subcentimeter and mildly enlarged upper abdominal lymph nodes, as detailed above, suspicious for lymph node involvement. Numerous foci of osseous metastasis, as detailed above. Partially imaged 3.9 cm enhancing lobulated soft tissue mass within the urinary bladder, suspicious for neoplasm.  - Urology and IR consulted   - Oncology planning for family meeting today - if patient pursues treatment then will need IR guided biopsy inpatient     #KANWAL   - resolving     #HTN  - c/w home meds     #HLD  - hold statin due to elevated LFTs      DVT proph: heparin sub-q      Pending:  family meeting today   Plan of care d/w pt   Dispo: Home yes

## 2025-03-17 NOTE — PROGRESS NOTE ADULT - ASSESSMENT
73 y/o M hx of DM, HTN, CVA (apx 4384-4972) w/o residual deficits, ? HF p/w fever + AMS x 1 day    EKG: NSR bigeminy    1. CHF  -euvolemic to dry on exam  -hold lasix  -echo unable to evaluate LV function, mild diastolic dysfunction     2. AMS  admitted with COVID  -t/t per primary team     3. PVCS  -tele shows NSR frequent PVCs  -c/w coreg to 25mg BID  -continue to monitor on tele     4. HTN  -controlled  -c/w coreg and lisinopril  -continue to monitor BP    5. DVT prophylaxis  -lovenox subq  
73 y/o M hx of DM, HTN, CVA (apx 4964-5135) w/o residual deficits, ? HF p/w fever + AMS x 1 day    EKG: NSR bigeminy    1. CHF  -euvolemic to dry on exam  -hold lasix      2. AMS  admitted with COVID  -MRI head pending  -t/t per primary team     3. PVCS  -tele shows NSR frequent PVCs  -recommend increasing coreg to 12.5mg BID  -continue to monitor on tele     4. HTN  -controlled  -c/w coreg and lisinopril  -continue to monitor BP    5. DVT prophylaxis  -lovenox subq    
73 y/o M hx of DM, HTN, CVA (apx 5083-4557) w/o residual deficits, ? HF p/w fever + AMS x 1 day    EKG: NSR bigeminy    1. CHF  -euvolemic to dry on exam  -hold lasix  -echo unable to evaluate LV function, mild diastolic dysfunction     2. AMS  admitted with COVID  -t/t per primary team     3. PVCS  -tele shows NSR frequent PVCs  -c/w coreg to 25mg BID  -continue to monitor on tele     4. HTN  -controlled  -c/w coreg and lisinopril  -continue to monitor BP    5. DVT prophylaxis  -lovenox subq
CTA neck in 2021 with atherosclerotic changes in the proximal cervical internal carotid arteries  Continue statin and Brilinta       
  71 y/o M hx of DM, HTN, CVA (apx [phone]) w/o residual deficits, ? HF p/w AMS, cough and decreased PO intake, likely 2/2 metabolic encephalopathy iso COVID infection  
73 y/o M hx of DM, HTN, CVA (apx 2259-8453) w/o residual deficits, ? HF p/w fever + AMS x 1 day. found to be covid + Pe AAOx1 follows simple commandsCTH chronic changes, unlikley bacterial meningitis    Impression TME in the setting of Covid    EEG slowing  MRI reviewed SWI as above no acute findings. Can follow up with Neurology, Dr. Varun Bernstein at [phone]  treat underlying issues and reassess 
73 y/o M hx of DM, HTN, CVA (apx 2297-5314) w/o residual deficits, ? HF p/w fever + AMS x 1 day. found to be covid + Pe AAOx1 follows simple commandsCTH chronic changes, unlikley bacterial meningitis    Impression TME in the setting of Covid    EEG slowing  MRI pending, lower suspicion  treat underlying issues and reassess 
73 y/o M hx of DM, HTN, CVA (apx 3632-9657) w/o residual deficits, ? HF p/w fever + AMS x 1 day    EKG: NSR bigeminy    1. CHF  -euvolemic to dry on exam  -hold lasix  -echo unable to evaluate LV function, mild diastolic dysfunction     2. AMS  admitted with COVID  -t/t per primary team     3. PVCS  -tele shows NSR frequent PVCs  -c/w coreg to 25mg BID  -continue to monitor on tele     4. HTN  -controlled  -c/w coreg and lisinopril  -continue to monitor BP    5. DVT prophylaxis  -lovenox subq
73 y/o M hx of DM, HTN, CVA (apx 4752-8424) w/o residual deficits, ? HF p/w fever + AMS x 1 day    EKG: NSR bigeminy    1. CHF  -euvolemic to dry on exam  -hold lasix  -echo pending    2. AMS  admitted with COVID  -MRI head pending  -t/t per primary team     3. PVCS  -tele shows NSR frequent PVCs  -increased coreg to 25mg BID  -continue to monitor on tele     4. HTN  -controlled  -c/w coreg and lisinopril  -continue to monitor BP    5. DVT prophylaxis  -lovenox subq  
71 y/o M hx of DM, HTN, CVA (apx 6905-8986) w/o residual deficits, ? HF p/w fever + AMS x 1 day    EKG: NSR bigeminy    1. CHF  -euvolemic to dry on exam  -hold lasix  -echo unable to evaluate LV function, mild diastolic dysfunction     2. AMS  admitted with COVID  -t/t per primary team     3. PVCS  -tele shows NSR frequent PVCs  -c/w coreg to 25mg BID  -continue to monitor on tele     4. HTN  -controlled  -c/w coreg and lisinopril  -continue to monitor BP    5. DVT prophylaxis  -lovenox subq
  73 y/o M hx of DM, HTN, CVA (apx [phone]) w/o residual deficits, ? HF p/w AMS, cough and decreased PO intake, likely 2/2 metabolic encephalopathy iso COVID infection  
  71 y/o M hx of DM, HTN, CVA (apx 7486-7164) w/o residual deficits, ? HF p/w AMS, cough and decreased PO intake, likely 2/2 metabolic encephalopathy iso COVID infection  
  73 y/o M hx of DM, HTN, CVA (apx 8128-5851) w/o residual deficits, ? HF p/w AMS, cough and decreased PO intake, likely 2/2 metabolic encephalopathy iso COVID infection  
  71 y/o M hx of DM, HTN, CVA (apx 1395-7228) w/o residual deficits, ? HF p/w AMS, cough and decreased PO intake, likely 2/2 metabolic encephalopathy iso COVID infection  
  71 y/o M hx of DM, HTN, CVA (apx 8423-0228) w/o residual deficits, ? HF p/w AMS, cough and decreased PO intake, likely 2/2 metabolic encephalopathy iso COVID infection  
  73 y/o M hx of DM, HTN, CVA (apx 3079-7681) w/o residual deficits, ? HF p/w AMS, cough and decreased PO intake, likely 2/2 metabolic encephalopathy iso COVID infection  
  71 y/o M hx of DM, HTN, CVA (apx [phone]) w/o residual deficits, ? HF p/w AMS, cough and decreased PO intake, likely 2/2 metabolic encephalopathy iso COVID infection  
  73 y/o M hx of DM, HTN, CVA (apx 0986-8010) w/o residual deficits, ? HF p/w AMS, cough and decreased PO intake, likely 2/2 metabolic encephalopathy iso COVID infection  
  73 y/o M hx of DM, HTN, CVA (apx 3342-3126) w/o residual deficits, ? HF p/w AMS, cough and decreased PO intake, likely 2/2 metabolic encephalopathy iso COVID infection